# Patient Record
Sex: MALE | NOT HISPANIC OR LATINO | URBAN - METROPOLITAN AREA
[De-identification: names, ages, dates, MRNs, and addresses within clinical notes are randomized per-mention and may not be internally consistent; named-entity substitution may affect disease eponyms.]

---

## 2017-10-26 ENCOUNTER — INPATIENT (INPATIENT)
Facility: HOSPITAL | Age: 82
LOS: 9 days | Discharge: HOME | DRG: 329 | End: 2017-11-05
Attending: SURGERY | Admitting: SURGERY
Payer: COMMERCIAL

## 2017-10-26 VITALS
OXYGEN SATURATION: 99 % | RESPIRATION RATE: 18 BRPM | HEART RATE: 99 BPM | DIASTOLIC BLOOD PRESSURE: 86 MMHG | TEMPERATURE: 98 F | SYSTOLIC BLOOD PRESSURE: 126 MMHG

## 2017-10-26 DIAGNOSIS — K56.609 UNSPECIFIED INTESTINAL OBSTRUCTION, UNSPECIFIED AS TO PARTIAL VERSUS COMPLETE OBSTRUCTION: ICD-10-CM

## 2017-10-26 LAB
ALBUMIN SERPL ELPH-MCNC: 4.4 G/DL — SIGNIFICANT CHANGE UP (ref 3.3–5)
ALP SERPL-CCNC: 99 U/L — SIGNIFICANT CHANGE UP (ref 40–120)
ALT FLD-CCNC: 14 U/L RC — SIGNIFICANT CHANGE UP (ref 10–45)
ANION GAP SERPL CALC-SCNC: 11 MMOL/L — SIGNIFICANT CHANGE UP (ref 5–17)
APPEARANCE UR: CLEAR — SIGNIFICANT CHANGE UP
APTT BLD: 28.3 SEC — SIGNIFICANT CHANGE UP (ref 27.5–37.4)
AST SERPL-CCNC: 19 U/L — SIGNIFICANT CHANGE UP (ref 10–40)
BACTERIA # UR AUTO: ABNORMAL /HPF
BASOPHILS # BLD AUTO: 0 K/UL — SIGNIFICANT CHANGE UP (ref 0–0.2)
BASOPHILS NFR BLD AUTO: 0.3 % — SIGNIFICANT CHANGE UP (ref 0–2)
BILIRUB SERPL-MCNC: 0.4 MG/DL — SIGNIFICANT CHANGE UP (ref 0.2–1.2)
BILIRUB UR-MCNC: NEGATIVE — SIGNIFICANT CHANGE UP
BLD GP AB SCN SERPL QL: NEGATIVE — SIGNIFICANT CHANGE UP
BUN SERPL-MCNC: 17 MG/DL — SIGNIFICANT CHANGE UP (ref 7–23)
CALCIUM SERPL-MCNC: 9.4 MG/DL — SIGNIFICANT CHANGE UP (ref 8.4–10.5)
CHLORIDE SERPL-SCNC: 103 MMOL/L — SIGNIFICANT CHANGE UP (ref 96–108)
CO2 SERPL-SCNC: 26 MMOL/L — SIGNIFICANT CHANGE UP (ref 22–31)
COLOR SPEC: YELLOW — SIGNIFICANT CHANGE UP
CREAT SERPL-MCNC: 0.79 MG/DL — SIGNIFICANT CHANGE UP (ref 0.5–1.3)
DIFF PNL FLD: NEGATIVE — SIGNIFICANT CHANGE UP
EOSINOPHIL # BLD AUTO: 0.1 K/UL — SIGNIFICANT CHANGE UP (ref 0–0.5)
EOSINOPHIL NFR BLD AUTO: 1.1 % — SIGNIFICANT CHANGE UP (ref 0–6)
EPI CELLS # UR: SIGNIFICANT CHANGE UP /HPF
GAS PNL BLDV: SIGNIFICANT CHANGE UP
GLUCOSE SERPL-MCNC: 120 MG/DL — HIGH (ref 70–99)
GLUCOSE UR QL: NEGATIVE — SIGNIFICANT CHANGE UP
HCT VFR BLD CALC: 39.4 % — SIGNIFICANT CHANGE UP (ref 39–50)
HGB BLD-MCNC: 13.5 G/DL — SIGNIFICANT CHANGE UP (ref 13–17)
INR BLD: 1.04 RATIO — SIGNIFICANT CHANGE UP (ref 0.88–1.16)
KETONES UR-MCNC: NEGATIVE — SIGNIFICANT CHANGE UP
LEUKOCYTE ESTERASE UR-ACNC: NEGATIVE — SIGNIFICANT CHANGE UP
LIDOCAIN IGE QN: 17 U/L — SIGNIFICANT CHANGE UP (ref 7–60)
LYMPHOCYTES # BLD AUTO: 0.9 K/UL — LOW (ref 1–3.3)
LYMPHOCYTES # BLD AUTO: 10.7 % — LOW (ref 13–44)
MCHC RBC-ENTMCNC: 32 PG — SIGNIFICANT CHANGE UP (ref 27–34)
MCHC RBC-ENTMCNC: 34.2 GM/DL — SIGNIFICANT CHANGE UP (ref 32–36)
MCV RBC AUTO: 93.8 FL — SIGNIFICANT CHANGE UP (ref 80–100)
MONOCYTES # BLD AUTO: 0.5 K/UL — SIGNIFICANT CHANGE UP (ref 0–0.9)
MONOCYTES NFR BLD AUTO: 6 % — SIGNIFICANT CHANGE UP (ref 2–14)
NEUTROPHILS # BLD AUTO: 6.7 K/UL — SIGNIFICANT CHANGE UP (ref 1.8–7.4)
NEUTROPHILS NFR BLD AUTO: 81.9 % — HIGH (ref 43–77)
NITRITE UR-MCNC: NEGATIVE — SIGNIFICANT CHANGE UP
PH UR: 6 — SIGNIFICANT CHANGE UP (ref 5–8)
PLATELET # BLD AUTO: 186 K/UL — SIGNIFICANT CHANGE UP (ref 150–400)
POTASSIUM SERPL-MCNC: 3.7 MMOL/L — SIGNIFICANT CHANGE UP (ref 3.5–5.3)
POTASSIUM SERPL-SCNC: 3.7 MMOL/L — SIGNIFICANT CHANGE UP (ref 3.5–5.3)
PROT SERPL-MCNC: 6.9 G/DL — SIGNIFICANT CHANGE UP (ref 6–8.3)
PROT UR-MCNC: SIGNIFICANT CHANGE UP
PROTHROM AB SERPL-ACNC: 11.3 SEC — SIGNIFICANT CHANGE UP (ref 9.8–12.7)
RBC # BLD: 4.2 M/UL — SIGNIFICANT CHANGE UP (ref 4.2–5.8)
RBC # FLD: 12 % — SIGNIFICANT CHANGE UP (ref 10.3–14.5)
RBC CASTS # UR COMP ASSIST: SIGNIFICANT CHANGE UP /HPF (ref 0–2)
RH IG SCN BLD-IMP: POSITIVE — SIGNIFICANT CHANGE UP
SODIUM SERPL-SCNC: 140 MMOL/L — SIGNIFICANT CHANGE UP (ref 135–145)
SP GR SPEC: 1.03 — HIGH (ref 1.01–1.02)
UROBILINOGEN FLD QL: NEGATIVE — SIGNIFICANT CHANGE UP
WBC # BLD: 8.2 K/UL — SIGNIFICANT CHANGE UP (ref 3.8–10.5)
WBC # FLD AUTO: 8.2 K/UL — SIGNIFICANT CHANGE UP (ref 3.8–10.5)
WBC UR QL: SIGNIFICANT CHANGE UP /HPF (ref 0–5)

## 2017-10-26 PROCEDURE — 71010: CPT | Mod: 26

## 2017-10-26 PROCEDURE — 74177 CT ABD & PELVIS W/CONTRAST: CPT | Mod: 26

## 2017-10-26 PROCEDURE — 99285 EMERGENCY DEPT VISIT HI MDM: CPT | Mod: 25

## 2017-10-26 RX ORDER — LEVOTHYROXINE SODIUM 125 MCG
37.5 TABLET ORAL DAILY
Qty: 0 | Refills: 0 | Status: DISCONTINUED | OUTPATIENT
Start: 2017-10-27 | End: 2017-10-28

## 2017-10-26 RX ORDER — LEVOTHYROXINE SODIUM 125 MCG
0 TABLET ORAL
Qty: 0 | Refills: 0 | COMMUNITY

## 2017-10-26 RX ORDER — MORPHINE SULFATE 50 MG/1
4 CAPSULE, EXTENDED RELEASE ORAL ONCE
Qty: 0 | Refills: 0 | Status: DISCONTINUED | OUTPATIENT
Start: 2017-10-26 | End: 2017-10-26

## 2017-10-26 RX ORDER — ACETAMINOPHEN 500 MG
1000 TABLET ORAL ONCE
Qty: 0 | Refills: 0 | Status: COMPLETED | OUTPATIENT
Start: 2017-10-26 | End: 2017-10-26

## 2017-10-26 RX ORDER — SODIUM CHLORIDE 9 MG/ML
1000 INJECTION, SOLUTION INTRAVENOUS
Qty: 0 | Refills: 0 | Status: DISCONTINUED | OUTPATIENT
Start: 2017-10-26 | End: 2017-10-27

## 2017-10-26 RX ORDER — SIMVASTATIN 20 MG/1
0 TABLET, FILM COATED ORAL
Qty: 0 | Refills: 0 | COMMUNITY

## 2017-10-26 RX ORDER — MORPHINE SULFATE 50 MG/1
2 CAPSULE, EXTENDED RELEASE ORAL EVERY 4 HOURS
Qty: 0 | Refills: 0 | Status: DISCONTINUED | OUTPATIENT
Start: 2017-10-26 | End: 2017-10-27

## 2017-10-26 RX ORDER — SODIUM CHLORIDE 9 MG/ML
1000 INJECTION INTRAMUSCULAR; INTRAVENOUS; SUBCUTANEOUS ONCE
Qty: 0 | Refills: 0 | Status: COMPLETED | OUTPATIENT
Start: 2017-10-26 | End: 2017-10-26

## 2017-10-26 RX ORDER — ONDANSETRON 8 MG/1
4 TABLET, FILM COATED ORAL ONCE
Qty: 0 | Refills: 0 | Status: COMPLETED | OUTPATIENT
Start: 2017-10-26 | End: 2017-10-26

## 2017-10-26 RX ORDER — ENOXAPARIN SODIUM 100 MG/ML
40 INJECTION SUBCUTANEOUS DAILY
Qty: 0 | Refills: 0 | Status: DISCONTINUED | OUTPATIENT
Start: 2017-10-26 | End: 2017-11-05

## 2017-10-26 RX ORDER — SIMVASTATIN 20 MG/1
40 TABLET, FILM COATED ORAL AT BEDTIME
Qty: 0 | Refills: 0 | Status: DISCONTINUED | OUTPATIENT
Start: 2017-10-26 | End: 2017-10-29

## 2017-10-26 RX ORDER — MORPHINE SULFATE 50 MG/1
4 CAPSULE, EXTENDED RELEASE ORAL EVERY 4 HOURS
Qty: 0 | Refills: 0 | Status: DISCONTINUED | OUTPATIENT
Start: 2017-10-26 | End: 2017-10-27

## 2017-10-26 RX ORDER — MORPHINE SULFATE 50 MG/1
2 CAPSULE, EXTENDED RELEASE ORAL ONCE
Qty: 0 | Refills: 0 | Status: DISCONTINUED | OUTPATIENT
Start: 2017-10-26 | End: 2017-10-26

## 2017-10-26 RX ADMIN — MORPHINE SULFATE 4 MILLIGRAM(S): 50 CAPSULE, EXTENDED RELEASE ORAL at 11:30

## 2017-10-26 RX ADMIN — Medication 1000 MILLIGRAM(S): at 05:30

## 2017-10-26 RX ADMIN — MORPHINE SULFATE 4 MILLIGRAM(S): 50 CAPSULE, EXTENDED RELEASE ORAL at 10:52

## 2017-10-26 RX ADMIN — ENOXAPARIN SODIUM 40 MILLIGRAM(S): 100 INJECTION SUBCUTANEOUS at 17:58

## 2017-10-26 RX ADMIN — SODIUM CHLORIDE 100 MILLILITER(S): 9 INJECTION, SOLUTION INTRAVENOUS at 17:58

## 2017-10-26 RX ADMIN — ONDANSETRON 4 MILLIGRAM(S): 8 TABLET, FILM COATED ORAL at 05:05

## 2017-10-26 RX ADMIN — SIMVASTATIN 40 MILLIGRAM(S): 20 TABLET, FILM COATED ORAL at 21:23

## 2017-10-26 RX ADMIN — MORPHINE SULFATE 2 MILLIGRAM(S): 50 CAPSULE, EXTENDED RELEASE ORAL at 17:23

## 2017-10-26 RX ADMIN — Medication 400 MILLIGRAM(S): at 05:04

## 2017-10-26 RX ADMIN — MORPHINE SULFATE 2 MILLIGRAM(S): 50 CAPSULE, EXTENDED RELEASE ORAL at 17:53

## 2017-10-26 RX ADMIN — SODIUM CHLORIDE 2000 MILLILITER(S): 9 INJECTION INTRAMUSCULAR; INTRAVENOUS; SUBCUTANEOUS at 05:04

## 2017-10-26 NOTE — ED PROVIDER NOTE - OBJECTIVE STATEMENT
81 y/o male with HLD and hypothyroid p/w abdominal pain. Per patient, pain started after dinner. Pain is suprapubic and sharp and relieved with burping. Never had pain like this in the past. Denies any fevers, chills, CP, SOB, N/V/D, blood in stool, hematuria, dysuria. States has not had a large bowel movement in 1 week. Patient reports passing minimal amount of gas. Denies any hx of constipation. Has not taken anything for constipation.

## 2017-10-26 NOTE — H&P ADULT - ASSESSMENT
82M PHx HLD & hypothyroidism w/ abdominal pain x1 day, no BM x1week, found to have LBO at splenic flexure w/ evidence of pancreatic mass, peritoneal & lung nodules,. Plan for endoscopy from below by GI to attempt decompression & further evaluate possible mass at splenic flexure.     -Admit to Surgical Oncology, Attending Dr. Lamont Elizondo   -GI consult pending for likely flexible sigmoidoscopy    -NPO  -IVF  -DVTppx  -con't home statin  -obtain recent colonoscopy report  -verify home Rx dosing    Discussed w/ Attending    Questions/concerns: contact oncgaro Ruiz team PA/resident [phone]

## 2017-10-26 NOTE — ED PROVIDER NOTE - MEDICAL DECISION MAKING DETAILS
82M hx hld hypothyroid presents to the ED with abdominal pain. pain diffuse constant non-radiating. pain became significant tonight. has only passed small amount of stool over the past week. Has had gas from below. No f/c/cp/sob/ha/dizziness/n/v/dysuria. on exam pt with soft abd but mild ttp. Will obtain labs ct abd ua symptom control and reassess. Gadiel Arredondo M.D., Attending Physician

## 2017-10-26 NOTE — ED ADULT NURSE NOTE - ED STAT RN HANDOFF DETAILS 2
hand off given to oncoming RN Shaista Valdez. Awaiting surg bed. NPO. IV intact without sx of infilt. minimal abd pain at present. voiding well

## 2017-10-26 NOTE — H&P ADULT - NSHPOUTPATIENTPROVIDERS_GEN_ALL_CORE
GI: [facility] [phone], in [place])  PMD: Dr. Josh Lovett [phone]

## 2017-10-26 NOTE — CONSULT NOTE ADULT - ASSESSMENT
81 y/o male with HLD and hypothyroid p/w abdominal pain x 1 day and constipation x 1 week.    1) LBO  Patient presenting with constipation and LBO at splenic flexure and diverticulosis seen on CT scan. Transverse colon was dilated on imaging--largest area of dilation ~5cm    DDx: colon cancer vs. volvulus vs. diverticulitis vs. constipation    -plan for flex sig  -keep NPO for now  -pain management as per primary team 81 y/o male with HLD and hypothyroid p/w abdominal pain x 1 day and constipation x 1 week.    1) LBO  Patient presenting with constipation and LBO at splenic flexure and diverticulosis seen on CT scan. Transverse colon was dilated on imaging--largest area of dilation ~5cm    DDx: colon cancer vs. volvulus vs. diverticulitis vs. constipation    -plan for flex sig tomorrow if no plan for surgery as per surgical team  -keep NPO for now  - please give 3 tap water enemas q1h starting 3 hours before the procedure  -pain management as per primary team

## 2017-10-26 NOTE — ED PROVIDER NOTE - ATTENDING CONTRIBUTION TO CARE
82M hx hld hypothyroid presents to the ED with abdominal pain. pain diffuse constant non-radiating. pain became significant tonight. has only passed small amount of stool over the past week. Has had gas from below. No f/c/cp/sob/ha/dizziness/n/v/dysuria. on exam pt with soft abd but mild ttp. Will obtain labs ct abd ua symptom control and reassess.     Constitutional: No fever or chills  Eyes: No visual changes  HEENT: No throat pain  CV: No chest pain  Resp: No SOB no cough  GI: + abd pain, decreased bm  : No dysuria  MSK: No musculoskeletal pain  Skin: No rash  Neuro: No headache     Constitutional: mild distress AAOx3  Eyes: PERRLA EOMI  Head: Normocephalic atraumatic  Mouth: MMM  Cardiac: regular rate   Resp: Lungs CTAB  GI: Abd soft mild lower abd ttp no rebound or guarding  Neuro: CN2-12 intact  Skin: No rashes   Gadiel Arredondo M.D., Attending Physician

## 2017-10-26 NOTE — ED ADULT NURSE REASSESSMENT NOTE - NS ED NURSE REASSESS COMMENT FT1
0704m Report received from night nurse. Pt awaiting CT results. A&Ox3. Wife at [facility]. IVL intact RACF without sx of infilt. No c/o abd pain with rest. 9/10 when belching. States frequent belching.  Color pink. Skin W&D. Lungs clear. lower abd TTP, firm

## 2017-10-26 NOTE — H&P ADULT - NSHPLABSRESULTS_GEN_ALL_CORE
13.5   8.2   )-----------( 186      ( 26 Oct 2017 05:03 )             39.4       10    140  |  103  |  17  ----------------------------<  120<H>  3.7   |  26  |  0.79    Ca    9.4      26 Oct 2017 05:03    TPro  6.9  /  Alb  4.4  /  TBili  0.4  /  DBili  x   /  AST  19  /  ALT  14  /  AlkPhos  99  10-26    Urinalysis Basic - ( 26 Oct 2017 11:25 )    Color: Yellow / Appearance: Clear / S.026 / pH: x  Gluc: x / Ketone: Negative  / Bili: Negative / Urobili: Negative   Blood: x / Protein: Trace / Nitrite: Negative   Leuk Esterase: Negative / RBC: 0-2 /HPF / WBC 0-2 /HPF   Sq Epi: x / Non Sq Epi: OCC /HPF / Bacteria: Few /HPF      CT abd/pelvis    IMPRESSION:   1.  Large bowel obstruction with transition point at the splenic flexure   where there is mural thickening and extracolonic soft tissue nodularity,   suspicious for neoplasm. Correlation with colonoscopy is recommended.   Possible additional areas of narrowing along the sigmoid colon.  2.  Asymmetric masslike enlargement ofthe pancreatic tail, possibly   neoplasm.  3.  Probable peritoneal carcinomatosis and small volume abdominal ascites.  4.  Colonic diverticulosis with mild inflammatory changes in the sigmoid   colon, probably related to adjacent neoplasm.  5.  Mild fullness of the left renal collecting system, possibly due to   distended urinary bladder.  6.  Enhancing nodule along the left lateral aspect of the urinary   bladder, measuring 0.9 cm, possibly inflammatory or neoplasm.  7.  Pulmonary nodules along the right major and minor fissure, possibly   intrapulmonary lymph nodes or metastases. Consider further evaluation   with dedicated CT chest.

## 2017-10-26 NOTE — ED PROVIDER NOTE - GASTROINTESTINAL, MLM
Abdomen soft, mildly distended. Minimal tenderness in LQ bilaterally. Can feel gas shifting in bowels with deep palpation.

## 2017-10-26 NOTE — ED ADULT NURSE NOTE - OBJECTIVE STATEMENT
82y male with hx of high cholesterol and hypothyroidism presents to the ER with abdominal pain. pt is alert and oriented x 4 and speaking coherently. Pt states he has had "a lot of gas" after dinner for the past week. Pt states he has sharp lower bilateral abdominal pain after eating. PT states the pain is decreased with belching. Pt in NAD- respirations equal and regular. abdomen soft and non distended. Pt appears comfortable at the bedside and denies pain. PT wife at the bedside. VSS, safety maintained. will reassess.

## 2017-10-26 NOTE — H&P ADULT - NSHPPHYSICALEXAM_GEN_ALL_CORE
General: well developed, well nourished, NAD  Neuro: alert and oriented, no focal deficits, moves all extremities spontaneously  HEENT: NCAT, anicteric, mucosa moist, no sublingual jaundice; no cervical or supraclavicular lymphadenopathy  Respiratory: CTA b/l, no increased work of breathing   CVS: regular no extra heart sounds  Abdomen: soft, nontender; fullness of LUQ  Extremities: no edema, sensation and movement grossly intact  Skin: warm, dry, appropriate color, no jaundice

## 2017-10-26 NOTE — H&P ADULT - HISTORY OF PRESENT ILLNESS
82M PHx HLD & hypothyroidism w/out PSHx presented to [facility] ED the morning of 10/26 w/ abdominal pain since the previous evening. Describes the pain as short, in his lower abdomen, relieved w/ belching. Reports pain started after dinner, and that he has not had a normal/large BM in >1wk. Some small pellets of stool this am, but no flatus since day prior to admission. Denies N/V, CP, SOB, blood in stool or urine. Denies h/o constipation, and he has not taken anything for constipation. Of note he reports recent colonoscopy (~3mo ago w/ [facility] [phone], in [place]) that was notable for 2 polyps that were removed and came back benign.     In ED VS: T36.4, P86, /97, RR18, SpO2 100 on RA. Labs notable for WBC 8.2, Hct 39.4, Venous lactate 1.2, lipase 17. 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 Surgery was consulted for evaluation of LBO & concern for metastatic disease.

## 2017-10-26 NOTE — CONSULT NOTE ADULT - SUBJECTIVE AND OBJECTIVE BOX
Chief Complaint:  Patient is a 82y old  Male who presents with a chief complaint of     HPI:  81 y/o male with HLD and hypothyroid p/w abdominal pain.     Per patient, pain started after dinner.      Denies any fevers, chills, CP, SOB, N/V/D, blood in stool, hematuria, dysuria. States has not had a large bowel movement in 1 week. Patient reports passing minimal amount of gas. Denies any hx of constipation. Has not taken anything for consiptation.      Allergies:  No Known Allergies      Home Medications:    Hospital Medications:      PMHX/PSHX:  Hypothyroid  Hyperlipemia  No significant past surgical history      Family history:      Social History:     ROS:     General:  No wt loss, fevers, chills, night sweats, fatigue,   Eyes:  Good vision, no reported pain  ENT:  No sore throat, pain, runny nose, dysphagia  CV:  No pain, palpitations, hypo/hypertension  Resp:  No dyspnea, cough, tachypnea, wheezing  GI:  See HPI  :  No pain, bleeding, incontinence, nocturia  Muscle:  No pain, weakness  Neuro:  No weakness, tingling, memory problems  Psych:  No fatigue, insomnia, mood problems, depression  Endocrine:  No polyuria, polydipsia, cold/heat intolerance  Heme:  No petechiae, ecchymosis, easy bruisability  Skin:  No rash, edema      PHYSICAL EXAM:     GENERAL:  Appears stated age, well-groomed, well-nourished, no distress  HEENT:  NC/AT,  conjunctivae clear and pink,  no JVD  CHEST:  Full & symmetric excursion, no increased effort, breath sounds clear  HEART:  Regular rhythm, S1, S2, no murmur/rub/S3/S4, no abdominal bruit, no edema  ABDOMEN:  Soft, non-tender, non-distended, normoactive bowel sounds,  no masses ,  EXTREMITIES:  no cyanosis,clubbing or edema  SKIN:  No rash/erythema/ecchymoses/petechiae/wounds/abscess/warm/dry  NEURO:  Alert, oriented    Vital Signs:  Vital Signs Last 24 Hrs  T(C): 36.4 (26 Oct 2017 07:25), Max: 36.4 (26 Oct 2017 03:45)  T(F): 97.5 (26 Oct 2017 07:25), Max: 97.6 (26 Oct 2017 03:45)  HR: 85 (26 Oct 2017 10:55) (85 - 103)  BP: 151/92 (26 Oct 2017 10:55) (121/90 - 151/92)  BP(mean): --  RR: 18 (26 Oct 2017 10:55) (18 - 18)  SpO2: 100% (26 Oct 2017 10:55) (99% - 100%)  Daily Height in cm: 160.02 (26 Oct 2017 07:25)    Daily     LABS:                        13.5   8.2   )-----------( 186      ( 26 Oct 2017 05:03 )             39.4     10-    140  |  103  |  17  ----------------------------<  120<H>  3.7   |  26  |  0.79    Ca    9.4      26 Oct 2017 05:03    TPro  6.9  /  Alb  4.4  /  TBili  0.4  /  DBili  x   /  AST  19  /  ALT  14  /  AlkPhos  99  10-    LIVER FUNCTIONS - ( 26 Oct 2017 05:03 )  Alb: 4.4 g/dL / Pro: 6.9 g/dL / ALK PHOS: 99 U/L / ALT: 14 U/L RC / AST: 19 U/L / GGT: x             Urinalysis Basic - ( 26 Oct 2017 11:25 )    Color: Yellow / Appearance: Clear / S.026 / pH: x  Gluc: x / Ketone: Negative  / Bili: Negative / Urobili: Negative   Blood: x / Protein: Trace / Nitrite: Negative   Leuk Esterase: Negative / RBC: 0-2 /HPF / WBC 0-2 /HPF   Sq Epi: x / Non Sq Epi: OCC /HPF / Bacteria: Few /HPF      Amylase Serum--      Lipase serum17       Ammonia--      Imaging: Chief Complaint:  Patient is a 82y old  Male who presents with a chief complaint of     HPI:  83 y/o male with HLD and hypothyroid p/w abdominal pain x 1 day and constipation x 1 week.    As per patient, constipation started about 1 week ago. He noticed decreased stool and increased flatulence. Says he was able to move his bowels but very small amount. Denies any melena, or BRBpR, No N/V fevers or chills. He says he normally moves his bowels about 1 x per day. No abnormal weight loss or problems with constipation in the past. No abdominal distension but did note decreased appetite and feeling of abdominal bloating. His last colonoscopy was last year for screening at [facility], two polyps found and removed.        Allergies:  No Known Allergies      Home Medications:    Hospital Medications:      PMHX/PSHX:  Hypothyroid  Hyperlipemia  No significant past surgical history      Family history:      Social History:     ROS:     General:  No wt loss, fevers, chills, night sweats, fatigue,   Eyes:  Good vision, no reported pain  ENT:  No sore throat, pain, runny nose, dysphagia  CV:  No pain, palpitations, hypo/hypertension  Resp:  No dyspnea, cough, tachypnea, wheezing  GI:  See HPI  :  No pain, bleeding, incontinence, nocturia  Muscle:  No pain, weakness  Neuro:  No weakness, tingling, memory problems  Psych:  No fatigue, insomnia, mood problems, depression  Endocrine:  No polyuria, polydipsia, cold/heat intolerance  Heme:  No petechiae, ecchymosis, easy bruisability  Skin:  No rash, edema      PHYSICAL EXAM:     GENERAL:  Appears stated age, well-groomed, well-nourished, no distress  HEENT:  NC/AT,  conjunctivae clear and pink,  no JVD  CHEST:  Full & symmetric excursion, no increased effort, breath sounds clear  HEART:  Regular rhythm, S1, S2, no murmur/rub/S3/S4, no abdominal bruit, no edema  ABDOMEN:  soft nondistended non tender on palpation, no masses appreciated    Vital Signs:  Vital Signs Last 24 Hrs  T(C): 36.4 (26 Oct 2017 07:25), Max: 36.4 (26 Oct 2017 03:45)  T(F): 97.5 (26 Oct 2017 07:25), Max: 97.6 (26 Oct 2017 03:45)  HR: 85 (26 Oct 2017 10:55) (85 - 103)  BP: 151/92 (26 Oct 2017 10:55) (121/90 - 151/92)  BP(mean): --  RR: 18 (26 Oct 2017 10:55) (18 - 18)  SpO2: 100% (26 Oct 2017 10:55) (99% - 100%)  Daily Height in cm: 160.02 (26 Oct 2017 07:25)    Daily     LABS:                        13.5   8.2   )-----------( 186      ( 26 Oct 2017 05:03 )             39.4     10-26    140  |  103  |  17  ----------------------------<  120<H>  3.7   |  26  |  0.79    Ca    9.4      26 Oct 2017 05:03    TPro  6.9  /  Alb  4.4  /  TBili  0.4  /  DBili  x   /  AST  19  /  ALT  14  /  AlkPhos  99  10-26    LIVER FUNCTIONS - ( 26 Oct 2017 05:03 )  Alb: 4.4 g/dL / Pro: 6.9 g/dL / ALK PHOS: 99 U/L / ALT: 14 U/L RC / AST: 19 U/L / GGT: x             Urinalysis Basic - ( 26 Oct 2017 11:25 )    Color: Yellow / Appearance: Clear / S.026 / pH: x  Gluc: x / Ketone: Negative  / Bili: Negative / Urobili: Negative   Blood: x / Protein: Trace / Nitrite: Negative   Leuk Esterase: Negative / RBC: 0-2 /HPF / WBC 0-2 /HPF   Sq Epi: x / Non Sq Epi: OCC /HPF / Bacteria: Few /HPF      Amylase Serum--      Lipase serum17       Ammonia--      Imaging:      < from: CT Abdomen and Pelvis w/ Oral Cont and w/ IV Cont (10.26.17 @ 07:33) >  IMPRESSION:   1.  Large bowel obstruction with transition point at the splenic flexure   where there is mural thickening and extracolonic soft tissue nodularity,   suspicious for neoplasm. Correlation with colonoscopy is recommended.   Possible additional areas of narrowing along the sigmoid colon.  2.  Asymmetric masslike enlargement ofthe pancreatic tail, possibly   neoplasm.  3.  Probable peritoneal carcinomatosis and small volume abdominal ascites.  4.  Colonic diverticulosis with mild inflammatory changes in the sigmoid   colon, probably related to adjacent neoplasm.  5.  Mild fullness of the left renal collecting system, possibly due to   distended urinary bladder.  6.  Enhancing nodule along the left lateral aspect of the urinary   bladder, measuring 0.9 cm, possibly inflammatory or neoplasm.  7.  Pulmonary nodules along the right major and minor fissure, possibly   intrapulmonary lymph nodes or metastases. Consider further evaluation   with dedicated CT chest.     < end of copied text > Chief Complaint:  Patient is a 82y old  Male who presents with a chief complaint of     HPI:  81 y/o male with HLD and hypothyroid p/w abdominal pain x 1 day and constipation x 1 week.    As per patient, constipation started about 1 week ago. He noticed decreased stool and increased gas and burping. Says he was able to move his bowels but very small amount. Denies any melena, or BRBpR, No N/V fevers or chills. He says he normally moves his bowels about 1 x per day. No abnormal weight loss or problems with constipation in the past. No abdominal distension but did note decreased appetite and feeling of abdominal bloating. His last colonoscopy was last year for screening at [facility], two polyps found and removed.        Allergies:  No Known Allergies      Home Medications:    Hospital Medications:      PMHX/PSHX:  Hypothyroid  Hyperlipemia  No significant past surgical history      Family history:      Social History:     ROS:     General:  No wt loss, fevers, chills, night sweats, fatigue,   Eyes:  Good vision, no reported pain  ENT:  No sore throat, pain, runny nose, dysphagia  CV:  No pain, palpitations, hypo/hypertension  Resp:  No dyspnea, cough, tachypnea, wheezing  GI:  See HPI  :  No pain, bleeding, incontinence, nocturia  Muscle:  No pain, weakness  Neuro:  No weakness, tingling, memory problems  Psych:  No fatigue, insomnia, mood problems, depression  Endocrine:  No polyuria, polydipsia, cold/heat intolerance  Heme:  No petechiae, ecchymosis, easy bruisability  Skin:  No rash, edema      PHYSICAL EXAM:     GENERAL:  Appears stated age, well-groomed, well-nourished, no distress  HEENT:  NC/AT,  conjunctivae clear and pink,  no JVD  CHEST:  Full & symmetric excursion, no increased effort, breath sounds clear  HEART:  Regular rhythm, S1, S2, no murmur/rub/S3/S4, no abdominal bruit, no edema  ABDOMEN:  soft nondistended non tender on palpation, no masses appreciated    Vital Signs:  Vital Signs Last 24 Hrs  T(C): 36.4 (26 Oct 2017 07:25), Max: 36.4 (26 Oct 2017 03:45)  T(F): 97.5 (26 Oct 2017 07:25), Max: 97.6 (26 Oct 2017 03:45)  HR: 85 (26 Oct 2017 10:55) (85 - 103)  BP: 151/92 (26 Oct 2017 10:55) (121/90 - 151/92)  BP(mean): --  RR: 18 (26 Oct 2017 10:55) (18 - 18)  SpO2: 100% (26 Oct 2017 10:55) (99% - 100%)  Daily Height in cm: 160.02 (26 Oct 2017 07:25)    Daily     LABS:                        13.5   8.2   )-----------( 186      ( 26 Oct 2017 05:03 )             39.4     10    140  |  103  |  17  ----------------------------<  120<H>  3.7   |  26  |  0.79    Ca    9.4      26 Oct 2017 05:03    TPro  6.9  /  Alb  4.4  /  TBili  0.4  /  DBili  x   /  AST  19  /  ALT  14  /  AlkPhos  99  10-26    LIVER FUNCTIONS - ( 26 Oct 2017 05:03 )  Alb: 4.4 g/dL / Pro: 6.9 g/dL / ALK PHOS: 99 U/L / ALT: 14 U/L RC / AST: 19 U/L / GGT: x             Urinalysis Basic - ( 26 Oct 2017 11:25 )    Color: Yellow / Appearance: Clear / S.026 / pH: x  Gluc: x / Ketone: Negative  / Bili: Negative / Urobili: Negative   Blood: x / Protein: Trace / Nitrite: Negative   Leuk Esterase: Negative / RBC: 0-2 /HPF / WBC 0-2 /HPF   Sq Epi: x / Non Sq Epi: OCC /HPF / Bacteria: Few /HPF      Amylase Serum--      Lipase serum17       Ammonia--      Imaging:      < from: CT Abdomen and Pelvis w/ Oral Cont and w/ IV Cont (10.26.17 @ 07:33) >  IMPRESSION:   1.  Large bowel obstruction with transition point at the splenic flexure   where there is mural thickening and extracolonic soft tissue nodularity,   suspicious for neoplasm. Correlation with colonoscopy is recommended.   Possible additional areas of narrowing along the sigmoid colon.  2.  Asymmetric masslike enlargement ofthe pancreatic tail, possibly   neoplasm.  3.  Probable peritoneal carcinomatosis and small volume abdominal ascites.  4.  Colonic diverticulosis with mild inflammatory changes in the sigmoid   colon, probably related to adjacent neoplasm.  5.  Mild fullness of the left renal collecting system, possibly due to   distended urinary bladder.  6.  Enhancing nodule along the left lateral aspect of the urinary   bladder, measuring 0.9 cm, possibly inflammatory or neoplasm.  7.  Pulmonary nodules along the right major and minor fissure, possibly   intrapulmonary lymph nodes or metastases. Consider further evaluation   with dedicated CT chest.     < end of copied text >

## 2017-10-26 NOTE — ED PROVIDER NOTE - CARDIAC, MLM
PROBLEM/ASSESSMENT/PLAN  PROBLEM: ACUTE HYPOXIC RESP FAILURE   4/28 11a /25/55 4/28 3l 92%   ASSESSMENT/RECOMMENDATION/PLAN  Keep HOB elevated 30  Pulse oximetry monitoring O2 supplementation Target to keep pulse ox in 90-95% range   PROBLEM:  RULE OUT VENOUS THROMBOEMBOLISM  4/28 Combative pt Only rle could be done No DVT Rle   ASSESSMENT/RECOMMENDATION/PLAN Has HO L leg DVT DVT P if no surgery  PROBLEM:  UTI  4/28 W 5.4   4/27 UA Over 50 W [place] bld Many bact   ASSESSMENT/RECOMMENDATION/PLAN On zosyn (4/27) lactobacillus (4/27)   PROBLEM:  RO PNEUMONIA  4/27 CXR IS changes lung l  ASSESSMENT/RECOMMENDATION/PLAN See UTI   PROBLEM:  LACTICEMIA /27-4/28 LA 2.4-1.8-.9   ASSESSMENT/RECOMMENDATION/PLAN Improved  PROBLEM:  HYPERTENSION  ASSESSMENT/RECOMMENDATION/PLAN On labetalol 100.2 (4/27)   PROBLEM:  L HYDRONEPHROSIS   4/27 CTAP Marked L obstr hydro sec multiple l renal pelvic calculi   ASSESSMENT/RECOMMENDATION/PLAN 4/28 Dr JIMENEZ discussed patient's condition in detail with niece who is health care proxy (Anahi Barriosnathalie) who did not want any intervention with cysto/stent or percutaneous nephrostomy, rather she requested medical management at this time,  PROBLEM: ROSEMARIE    2/24-4/27-4/28 Cr .95-1.9-1.6   ASSESSMENT/RECOMMENDATION/PLAN On NS 75 (4/27)   PROBLEM:  BPH   ASSESSMENT/RECOMMENDATION/PLAN On Tamsulosin .4 (4/27)   PROBLEM:  ANEMIA Hb drop   2/24-4/27-4/28 Hb 11.8-11.2-9.8   ASSESSMENT/RECOMMENDATION/PLAN Drop may in part be sec to ivf  PROBLEM:   THROMBOCYTOPENIA   2/24-4/27-4/28  Plt 108-114-81  ASSESSMENT/RECOMMENDATION/PLAN Likely reduced sec sepsis   PROBLEM:  AMS  4/27 CT H n   ASSESSMENT/RECOMMENDATION/PLAN   PROBLEM:  HYPOTHYRODIISM   ASSESSMENT/RECOMMENDATION/PLAN On levoxyl 100 (4/27)    GLOBAL ISSUE/BEST PRACTICE:        PROBLEM:      Analgesia:         na       PROBLEM: Sedation:       na  PROBLEM: HOB elevation:      Yes  PROBLEM: Stress ulcer prophylaxis:        pepcid 20 (4/27)           PROBLEM: VTE prophylaxis:            hsc (4/27)               PROBLEM: Glycemic control:       na  PROBLEM: Nutrition:         ROQUE (4/28)   PROBLEM: Advanced directive if any:                 dnr (4/28)   TIME SPENT Over 55 minutes spent on total encounter; activities included  rendering direct patient care, counseling and/or coordinating care by the attending physician  reviewing notes, labs data/ imaging , discussion with multidisciplinary team. Normal rate, regular rhythm.  Heart sounds S1, S2.  No murmurs, rubs or gallops.

## 2017-10-26 NOTE — CONSULT NOTE ADULT - SUBJECTIVE AND OBJECTIVE BOX
82M PHx HLD & hypothyroidism w/out PSHx presented to [facility] ED the morning of 10/26 w/ abdominal pain since the previous evening. Describes the pain as short, in his lower abdomen, relieved w/ belching. Reports pain started after dinner, and that he has not had a normal/large BM in >1wk. Some small pellets of stool this am, but no flatus since day prior to admission. Denies N/V, CP, SOB, blood in stool or urine. Denies h/o constipation, and he has not taken anything for constipation. Of note he reports recent colonoscopy (~3mo ago w/ [facility] [phone], in [place]) that was notable for 2 polyps that were removed and came back benign.     In ED VS: T36.4, P86, /97, RR18, SpO2 100 on RA. Labs notable for WBC 8.2, Hct 39.4, Venous lactate 1.2, lipase 17. 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       Large bowel obstruction with transition point at the splenic flexure   where there is mural thickening and extracolonic soft tissue nodularity,   suspicious for neoplasm. Correlation with colonoscopy is recommended.   Possible additional areas of narrowing along the sigmoid colon.  2.  Asymmetric masslike enlargement of the pancreatic tail, possibly   neoplasm.  3.  Probable peritoneal carcinomatosis and small volume abdominal ascites.  4.  Colonic diverticulosis with mild inflammatory changes in the sigmoid   colon, probably related to adjacent neoplasm.  5.  Mild fullness of the left renal collecting system, possibly due to   distended urinary bladder.  6.  Enhancing nodule along the left lateral aspect of the urinary   bladder, measuring 0.9 cm, possibly inflammatory or neoplasm.  7.  Pulmonary nodules along the right major and minor fissure, possibly   intrapulmonary lymph nodes or metastases. Consider further evaluation   with dedicated CT chest.       Outpatient MD's  GI: [facility] [phone], in [place])  PMD: Dr. Josh Lovett [phone] Surgical Oncology Consult    CC: abdominal pain, no BM x1wk    HPI:  82M PHx HLD & hypothyroidism w/out PSHx presented to [facility] ED the morning of 10/26 w/ abdominal pain since the previous evening. Describes the pain as short, in his lower abdomen, relieved w/ belching. Reports pain started after dinner, and that he has not had a normal/large BM in >1wk. Some small pellets of stool this am, but no flatus since day prior to admission. Denies N/V, CP, SOB, blood in stool or urine. Denies h/o constipation, and he has not taken anything for constipation. Of note he reports recent colonoscopy (~3mo ago w/ [facility] [phone], in [place]) that was notable for 2 polyps that were removed and came back benign.     In ED VS: T36.4, P86, /97, RR18, SpO2 100 on RA. Labs notable for WBC 8.2, Hct 39.4, Venous lactate 1.2, lipase 17. 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 Surgery was consulted for evaluation of LBO & concern for metastatic disease.       Outpatient MD's  GI: [facility] [phone], in [place])  PMD: Dr. Josh Lovett [phone]      PMHx: Hypothyroid  Hyperlipemia    PSHx: No significant past surgical history    Medications (inpatient): enoxaparin Injectable 40 milliGRAM(s) SubCutaneous daily  lactated ringers. 1000 milliLiter(s) IV Continuous <Continuous>  simvastatin 40 milliGRAM(s) Oral at bedtime    Home Rx:  synthroid (unknown dose)  simvastatin (unknown dose)    Allergies: No Known Allergies    Social Hx: denies EtOH or tobacco use. .    Physical Exam  T(C): 36.4 (10-26-17 @ 07:25)  HR: 85 (10-26-17 @ 10:55) (85 - 103)  BP: 151/92 (10-26-17 @ 10:55) (121/90 - 151/92)  RR: 18 (10-26-17 @ 10:55) (18 - 18)  SpO2: 100% (10-26-17 @ 10:55) (99% - 100%)  Wt(kg): --  Tmax: T(C): , Max: 36.4 (10-26-17 @ 03:45)  Wt(kg): --      General: well developed, well nourished, NAD  Neuro: alert and oriented, no focal deficits, moves all extremities spontaneously  HEENT: NCAT, anicteric, mucosa moist, no sublingual jaundice; no cervical or supraclavicular lymphadenopathy  Respiratory: CTA b/l, no increased work of breathing   CVS: regular no extra heart sounds  Abdomen: soft, nontender; fullness of LUQ  Extremities: no edema, sensation and movement grossly intact  Skin: warm, dry, appropriate color, no jaundice      Labs:                        13.5   8.2   )-----------( 186      ( 26 Oct 2017 05:03 )             39.4       10-26    140  |  103  |  17  ----------------------------<  120<H>  3.7   |  26  |  0.79    Ca    9.4      26 Oct 2017 05:03    TPro  6.9  /  Alb  4.4  /  TBili  0.4  /  DBili  x   /  AST  19  /  ALT  14  /  AlkPhos  99  10-26    Urinalysis Basic - ( 26 Oct 2017 11:25 )    Color: Yellow / Appearance: Clear / S.026 / pH: x  Gluc: x / Ketone: Negative  / Bili: Negative / Urobili: Negative   Blood: x / Protein: Trace / Nitrite: Negative   Leuk Esterase: Negative / RBC: 0-2 /HPF / WBC 0-2 /HPF   Sq Epi: x / Non Sq Epi: OCC /HPF / Bacteria: Few /HPF        Imaging and other studies: Surgical Oncology Consult    CC: abdominal pain, no BM x1wk    HPI:  82M PHx HLD & hypothyroidism w/out PSHx presented to [facility] ED the morning of 10/26 w/ abdominal pain since the previous evening. Describes the pain as short, in his lower abdomen, relieved w/ belching. Reports pain started after dinner, and that he has not had a normal/large BM in >1wk. Some small pellets of stool this am, but no flatus since day prior to admission. Denies N/V, CP, SOB, blood in stool or urine. Denies h/o constipation, and he has not taken anything for constipation. Of note he reports recent colonoscopy (~3mo ago w/ [facility] [phone], in [place]) that was notable for 2 polyps that were removed and came back benign.     In ED VS: T36.4, P86, /97, RR18, SpO2 100 on RA. Labs notable for WBC 8.2, Hct 39.4, Venous lactate 1.2, lipase 17. 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 Surgery was consulted for evaluation of LBO & concern for metastatic disease.       Outpatient MD's  GI: [facility] [phone], in [place])  PMD: Dr. Josh Lovett [phone]      PMHx: Hypothyroid  Hyperlipemia    PSHx: No significant past surgical history    Medications (inpatient): enoxaparin Injectable 40 milliGRAM(s) SubCutaneous daily  lactated ringers. 1000 milliLiter(s) IV Continuous <Continuous>  simvastatin 40 milliGRAM(s) Oral at bedtime    Home Rx:  synthroid (unknown dose)  simvastatin (unknown dose)    Allergies: No Known Allergies    Social Hx: denies EtOH or tobacco use. .    Physical Exam  T(C): 36.4 (10-26-17 @ 07:25)  HR: 85 (10-26-17 @ 10:55) (85 - 103)  BP: 151/92 (10-26-17 @ 10:55) (121/90 - 151/92)  RR: 18 (10-26-17 @ 10:55) (18 - 18)  SpO2: 100% (10-26-17 @ 10:55) (99% - 100%)  Wt(kg): --  Tmax: T(C): , Max: 36.4 (10-26-17 @ 03:45)  Wt(kg): --      General: well developed, well nourished, NAD  Neuro: alert and oriented, no focal deficits, moves all extremities spontaneously  HEENT: NCAT, anicteric, mucosa moist, no sublingual jaundice; no cervical or supraclavicular lymphadenopathy  Respiratory: CTA b/l, no increased work of breathing   CVS: regular no extra heart sounds  Abdomen: soft, nontender; fullness of LUQ  Extremities: no edema, sensation and movement grossly intact  Skin: warm, dry, appropriate color, no jaundice      Labs:                        13.5   8.2   )-----------( 186      ( 26 Oct 2017 05:03 )             39.4       10-26    140  |  103  |  17  ----------------------------<  120<H>  3.7   |  26  |  0.79    Ca    9.4      26 Oct 2017 05:03    TPro  6.9  /  Alb  4.4  /  TBili  0.4  /  DBili  x   /  AST  19  /  ALT  14  /  AlkPhos  99  10-26    Urinalysis Basic - ( 26 Oct 2017 11:25 )    Color: Yellow / Appearance: Clear / S.026 / pH: x  Gluc: x / Ketone: Negative  / Bili: Negative / Urobili: Negative   Blood: x / Protein: Trace / Nitrite: Negative   Leuk Esterase: Negative / RBC: 0-2 /HPF / WBC 0-2 /HPF   Sq Epi: x / Non Sq Epi: OCC /HPF / Bacteria: Few /HPF        Imaging and other studies:    < from: CT Abdomen and Pelvis w/ Oral Cont and w/ IV Cont (10.26.17 @ 07:33) >  CT ABDOMEN AND PELVIS OC IC                            PROCEDURE DATE:  10/26/2017            INTERPRETATION:  CLINICAL INFORMATION: Lower quadrant abdominal pain. No   bowel movement for one week.    COMPARISON: None.    PROCEDURE:   CT of the Abdomen and Pelvis was performed with intravenous contrast.   Intravenous contrast: 90 mL Omnipaque 350. 10 mL discarded.  Oral contrast: positive contrast was administered.  Sagittal and coronal reformats were performed.    FINDINGS:    LOWER CHEST: Pulmonary nodules along the right major and minor fissure.   The largest measures 0.3 cm.    LIVER: Within normal limits.  BILE DUCTS: Normal caliber.   GALLBLADDER: Within normal limits.  SPLEEN: Within normal limits.  PANCREAS: Asymmetric masslike enlargement of the pancreatic tail.  ADRENALS: Within normal limits.  KIDNEYS/URETERS: Mild fullness of the left renal collecting system.     BLADDER: Enhancing nodule along the left lateral wall of the urinary   bladder, measuring 0.9 x 0.9 cm. Distended urinary bladder.  REPRODUCTIVE ORGANS: The prostate is enlarged.    BOWEL: Large bowel obstruction with transition point at the splenic   flexure where there is mural thickening and extracolonic soft tissue   nodularity, possibly neoplasm. Additional area of luminal narrowing along   the junction of the descending and sigmoid colon. Colonic diverticulosis   with mild inflammatory changes in the sigmoid colon.  PERITONEUM: Peritoneal implant in the right lower quadrant (series 2,   image 84), measuring 1.7 x 1.0 cm, probably peritoneal carcinomatosis.   Small perisplenic ascites and a small amount of fluid along the right   paracolic gutter.  VESSELS:  Atherosclerotic change of the abdominal aorta and its branches.  RETROPERITONEUM: No lymphadenopathy.    ABDOMINAL WALL: Small left inguinal hernia  BONES: Multiple lucent lesions in the pelvis, nonspecific. Degenerative   changes of the spine.    IMPRESSION:   1.  Large bowel obstruction with transition point at the splenic flexure   where there is mural thickening and extracolonic soft tissue nodularity,   suspicious for neoplasm. Correlation with colonoscopy is recommended.   Possible additional areas of narrowing along the sigmoid colon.  2.  Asymmetric masslike enlargement ofthe pancreatic tail, possibly   neoplasm.  3.  Probable peritoneal carcinomatosis and small volume abdominal ascites.  4.  Colonic diverticulosis with mild inflammatory changes in the sigmoid   colon, probably related to adjacent neoplasm.  5.  Mild fullness of the left renal collecting system, possibly due to   distended urinary bladder.  6.  Enhancing nodule along the left lateral aspect of the urinary   bladder, measuring 0.9 cm, possibly inflammatory or neoplasm.  7.  Pulmonary nodules along the right major and minor fissure, possibly   intrapulmonary lymph nodes or metastases. Consider further evaluation   with dedicated CT chest.     < end of copied text >

## 2017-10-26 NOTE — ED ADULT NURSE REASSESSMENT NOTE - NS ED NURSE REASSESS COMMENT FT1
Report received from Nadiya SHANKS. Pt currently admitted to surgery. VSS. Will continue to monitor and assess while offering support and reassurance.

## 2017-10-27 ENCOUNTER — RESULT REVIEW (OUTPATIENT)
Age: 82
End: 2017-10-27

## 2017-10-27 LAB
ANION GAP SERPL CALC-SCNC: 14 MMOL/L — SIGNIFICANT CHANGE UP (ref 5–17)
BUN SERPL-MCNC: 10 MG/DL — SIGNIFICANT CHANGE UP (ref 7–23)
CALCIUM SERPL-MCNC: 8.9 MG/DL — SIGNIFICANT CHANGE UP (ref 8.4–10.5)
CEA SERPL-MCNC: 2.6 NG/ML — SIGNIFICANT CHANGE UP (ref 0–3.8)
CEA SERPL-MCNC: 3 NG/ML — SIGNIFICANT CHANGE UP (ref 0–3.8)
CHLORIDE SERPL-SCNC: 101 MMOL/L — SIGNIFICANT CHANGE UP (ref 96–108)
CO2 SERPL-SCNC: 25 MMOL/L — SIGNIFICANT CHANGE UP (ref 22–31)
CREAT SERPL-MCNC: 0.74 MG/DL — SIGNIFICANT CHANGE UP (ref 0.5–1.3)
GLUCOSE SERPL-MCNC: 120 MG/DL — HIGH (ref 70–99)
HCT VFR BLD CALC: 42 % — SIGNIFICANT CHANGE UP (ref 39–50)
HGB BLD-MCNC: 14.1 G/DL — SIGNIFICANT CHANGE UP (ref 13–17)
MAGNESIUM SERPL-MCNC: 1.9 MG/DL — SIGNIFICANT CHANGE UP (ref 1.6–2.6)
MCHC RBC-ENTMCNC: 30.7 PG — SIGNIFICANT CHANGE UP (ref 27–34)
MCHC RBC-ENTMCNC: 33.6 GM/DL — SIGNIFICANT CHANGE UP (ref 32–36)
MCV RBC AUTO: 91.5 FL — SIGNIFICANT CHANGE UP (ref 80–100)
PHOSPHATE SERPL-MCNC: 2.9 MG/DL — SIGNIFICANT CHANGE UP (ref 2.5–4.5)
PLATELET # BLD AUTO: 222 K/UL — SIGNIFICANT CHANGE UP (ref 150–400)
POTASSIUM SERPL-MCNC: 4.4 MMOL/L — SIGNIFICANT CHANGE UP (ref 3.5–5.3)
POTASSIUM SERPL-SCNC: 4.4 MMOL/L — SIGNIFICANT CHANGE UP (ref 3.5–5.3)
RBC # BLD: 4.59 M/UL — SIGNIFICANT CHANGE UP (ref 4.2–5.8)
RBC # FLD: 13.3 % — SIGNIFICANT CHANGE UP (ref 10.3–14.5)
SODIUM SERPL-SCNC: 140 MMOL/L — SIGNIFICANT CHANGE UP (ref 135–145)
WBC # BLD: 10.28 K/UL — SIGNIFICANT CHANGE UP (ref 3.8–10.5)
WBC # FLD AUTO: 10.28 K/UL — SIGNIFICANT CHANGE UP (ref 3.8–10.5)

## 2017-10-27 PROCEDURE — 45347 SIGMOIDOSCOPY W/PLCMT STENT: CPT

## 2017-10-27 PROCEDURE — 88342 IMHCHEM/IMCYTCHM 1ST ANTB: CPT | Mod: 26

## 2017-10-27 PROCEDURE — 44143 PARTIAL REMOVAL OF COLON: CPT

## 2017-10-27 PROCEDURE — 49020 DRAINAGE ABDOM ABSCESS OPEN: CPT | Mod: 59

## 2017-10-27 PROCEDURE — 71010: CPT | Mod: 26

## 2017-10-27 PROCEDURE — 44139 MOBILIZATION OF COLON: CPT

## 2017-10-27 PROCEDURE — 88305 TISSUE EXAM BY PATHOLOGIST: CPT | Mod: 26

## 2017-10-27 PROCEDURE — 88341 IMHCHEM/IMCYTCHM EA ADD ANTB: CPT | Mod: 26,59

## 2017-10-27 PROCEDURE — 88307 TISSUE EXAM BY PATHOLOGIST: CPT | Mod: 26

## 2017-10-27 RX ORDER — SODIUM CHLORIDE 9 MG/ML
1000 INJECTION INTRAMUSCULAR; INTRAVENOUS; SUBCUTANEOUS ONCE
Qty: 0 | Refills: 0 | Status: DISCONTINUED | OUTPATIENT
Start: 2017-10-27 | End: 2017-10-28

## 2017-10-27 RX ORDER — PIPERACILLIN AND TAZOBACTAM 4; .5 G/20ML; G/20ML
3.38 INJECTION, POWDER, LYOPHILIZED, FOR SOLUTION INTRAVENOUS EVERY 8 HOURS
Qty: 0 | Refills: 0 | Status: DISCONTINUED | OUTPATIENT
Start: 2017-10-27 | End: 2017-11-03

## 2017-10-27 RX ORDER — PIPERACILLIN AND TAZOBACTAM 4; .5 G/20ML; G/20ML
3.38 INJECTION, POWDER, LYOPHILIZED, FOR SOLUTION INTRAVENOUS ONCE
Qty: 0 | Refills: 0 | Status: COMPLETED | OUTPATIENT
Start: 2017-10-27 | End: 2017-10-27

## 2017-10-27 RX ORDER — ONDANSETRON 8 MG/1
4 TABLET, FILM COATED ORAL ONCE
Qty: 0 | Refills: 0 | Status: DISCONTINUED | OUTPATIENT
Start: 2017-10-27 | End: 2017-10-28

## 2017-10-27 RX ORDER — VANCOMYCIN HCL 1 G
1000 VIAL (EA) INTRAVENOUS ONCE
Qty: 0 | Refills: 0 | Status: COMPLETED | OUTPATIENT
Start: 2017-10-27 | End: 2017-10-27

## 2017-10-27 RX ORDER — HYDROMORPHONE HYDROCHLORIDE 2 MG/ML
0.5 INJECTION INTRAMUSCULAR; INTRAVENOUS; SUBCUTANEOUS
Qty: 0 | Refills: 0 | Status: DISCONTINUED | OUTPATIENT
Start: 2017-10-27 | End: 2017-10-28

## 2017-10-27 RX ORDER — VANCOMYCIN HCL 1 G
VIAL (EA) INTRAVENOUS
Qty: 0 | Refills: 0 | Status: DISCONTINUED | OUTPATIENT
Start: 2017-10-27 | End: 2017-10-29

## 2017-10-27 RX ORDER — POTASSIUM PHOSPHATE, MONOBASIC POTASSIUM PHOSPHATE, DIBASIC 236; 224 MG/ML; MG/ML
15 INJECTION, SOLUTION INTRAVENOUS ONCE
Qty: 0 | Refills: 0 | Status: DISCONTINUED | OUTPATIENT
Start: 2017-10-27 | End: 2017-10-27

## 2017-10-27 RX ORDER — ACETAMINOPHEN 500 MG
1000 TABLET ORAL ONCE
Qty: 0 | Refills: 0 | Status: COMPLETED | OUTPATIENT
Start: 2017-10-27 | End: 2017-10-27

## 2017-10-27 RX ORDER — MAGNESIUM SULFATE 500 MG/ML
1 VIAL (ML) INJECTION ONCE
Qty: 0 | Refills: 0 | Status: COMPLETED | OUTPATIENT
Start: 2017-10-27 | End: 2017-10-27

## 2017-10-27 RX ORDER — SODIUM CHLORIDE 9 MG/ML
1000 INJECTION INTRAMUSCULAR; INTRAVENOUS; SUBCUTANEOUS
Qty: 0 | Refills: 0 | Status: DISCONTINUED | OUTPATIENT
Start: 2017-10-27 | End: 2017-10-30

## 2017-10-27 RX ORDER — SODIUM CHLORIDE 9 MG/ML
1000 INJECTION INTRAMUSCULAR; INTRAVENOUS; SUBCUTANEOUS
Qty: 0 | Refills: 0 | Status: DISCONTINUED | OUTPATIENT
Start: 2017-10-27 | End: 2017-10-27

## 2017-10-27 RX ORDER — VANCOMYCIN HCL 1 G
1000 VIAL (EA) INTRAVENOUS EVERY 12 HOURS
Qty: 0 | Refills: 0 | Status: DISCONTINUED | OUTPATIENT
Start: 2017-10-28 | End: 2017-10-29

## 2017-10-27 RX ADMIN — ENOXAPARIN SODIUM 40 MILLIGRAM(S): 100 INJECTION SUBCUTANEOUS at 11:53

## 2017-10-27 RX ADMIN — HYDROMORPHONE HYDROCHLORIDE 0.5 MILLIGRAM(S): 2 INJECTION INTRAMUSCULAR; INTRAVENOUS; SUBCUTANEOUS at 22:00

## 2017-10-27 RX ADMIN — PIPERACILLIN AND TAZOBACTAM 200 GRAM(S): 4; .5 INJECTION, POWDER, LYOPHILIZED, FOR SOLUTION INTRAVENOUS at 22:17

## 2017-10-27 RX ADMIN — HYDROMORPHONE HYDROCHLORIDE 0.5 MILLIGRAM(S): 2 INJECTION INTRAMUSCULAR; INTRAVENOUS; SUBCUTANEOUS at 21:35

## 2017-10-27 RX ADMIN — Medication 400 MILLIGRAM(S): at 21:40

## 2017-10-27 RX ADMIN — MORPHINE SULFATE 4 MILLIGRAM(S): 50 CAPSULE, EXTENDED RELEASE ORAL at 02:15

## 2017-10-27 RX ADMIN — SODIUM CHLORIDE 100 MILLILITER(S): 9 INJECTION INTRAMUSCULAR; INTRAVENOUS; SUBCUTANEOUS at 21:45

## 2017-10-27 RX ADMIN — MORPHINE SULFATE 4 MILLIGRAM(S): 50 CAPSULE, EXTENDED RELEASE ORAL at 01:55

## 2017-10-27 RX ADMIN — HYDROMORPHONE HYDROCHLORIDE 0.5 MILLIGRAM(S): 2 INJECTION INTRAMUSCULAR; INTRAVENOUS; SUBCUTANEOUS at 22:40

## 2017-10-27 RX ADMIN — Medication 37.5 MICROGRAM(S): at 05:33

## 2017-10-27 RX ADMIN — SODIUM CHLORIDE 100 MILLILITER(S): 9 INJECTION INTRAMUSCULAR; INTRAVENOUS; SUBCUTANEOUS at 22:23

## 2017-10-27 RX ADMIN — HYDROMORPHONE HYDROCHLORIDE 0.5 MILLIGRAM(S): 2 INJECTION INTRAMUSCULAR; INTRAVENOUS; SUBCUTANEOUS at 23:00

## 2017-10-27 RX ADMIN — Medication 250 MILLIGRAM(S): at 23:00

## 2017-10-27 RX ADMIN — Medication 1000 MILLIGRAM(S): at 22:00

## 2017-10-27 NOTE — PROGRESS NOTE ADULT - SUBJECTIVE AND OBJECTIVE BOX
Surgery Progress Note    S: Patient seen and examined. No acute events overnight. Pain well controlled with current regimen. Denies nausea/vomiting. Endorses passing gas and bowel movements.     O:  Vital Signs Last 24 Hrs  T(C): 36.8 (27 Oct 2017 16:22), Max: 37.1 (26 Oct 2017 17:19)  T(F): 98.2 (27 Oct 2017 16:22), Max: 98.7 (26 Oct 2017 17:19)  HR: 78 (27 Oct 2017 16:22) (68 - 106)  BP: 144/88 (27 Oct 2017 16:22) (124/73 - 162/79)  BP(mean): --  RR: 18 (27 Oct 2017 16:22) (18 - 18)  SpO2: 97% (27 Oct 2017 16:22) (95% - 99%)    I&O's Detail    26 Oct 2017 07:01  -  27 Oct 2017 07:00  --------------------------------------------------------  IN:    lactated ringers.: 1000 mL  Total IN: 1000 mL    OUT:    Voided: 600 mL  Total OUT: 600 mL    Total NET: 400 mL      27 Oct 2017 07:01  -  27 Oct 2017 17:12  --------------------------------------------------------  IN:    lactated ringers.: 1000 mL  Total IN: 1000 mL    OUT:  Total OUT: 0 mL    Total NET: 1000 mL          MEDICATIONS  (STANDING):  enoxaparin Injectable 40 milliGRAM(s) SubCutaneous daily  lactated ringers. 1000 milliLiter(s) (100 mL/Hr) IV Continuous <Continuous>  levothyroxine Injectable 37.5 MICROGram(s) IV Push daily  magnesium sulfate  IVPB 1 Gram(s) IV Intermittent once  potassium phosphate IVPB 15 milliMole(s) IV Intermittent once  simvastatin 40 milliGRAM(s) Oral at bedtime    MEDICATIONS  (PRN):  morphine  - Injectable 2 milliGRAM(s) IV Push every 4 hours PRN Moderate Pain (4 - 6)  morphine  - Injectable 4 milliGRAM(s) IV Push every 4 hours PRN Severe Pain (7 - 10)                            14.1   10.28 )-----------( 222      ( 27 Oct 2017 08:33 )             42.0       10-27    140  |  101  |  10  ----------------------------<  120<H>  4.4   |  25  |  0.74    Ca    8.9      27 Oct 2017 08:26  Phos  2.9     10-27  Mg     1.9     10-27    TPro  6.9  /  Alb  4.4  /  TBili  0.4  /  DBili  x   /  AST  19  /  ALT  14  /  AlkPhos  99  10-26      Physical Exam:  Gen: Laying in bed, NAD, alert and oriented.   Resp: Unlabored breathing  Abd: soft, non-distended, TTP in LUQ

## 2017-10-27 NOTE — PROGRESS NOTE ADULT - ASSESSMENT
82M PHx HLD & hypothyroidism w/ abdominal pain x1 day, no BM x1week, found to have LBO at splenic flexure w/ evidence of pancreatic mass, peritoneal & lung nodules,. Plan for endoscopy from below by GI to attempt decompression & further evaluate possible mass at splenic flexure.       -To GI today for flexible sigmoidoscopy, possible colonic stent placement  -NPO  -IVF  -DVT ppx  -Home meds  -obtain recent colonoscopy report  -Pain control  -CT chest w/ IV contrast to evaluate for metastatic disease

## 2017-10-27 NOTE — PROGRESS NOTE ADULT - SUBJECTIVE AND OBJECTIVE BOX
Pre-Endoscopy Evaluation      Referring Physician:  Dr. Lamont Elizondo                                Procedure: Flexible Sigmoidoscopy    Indication for Procedure: Large Bowel Obstruction    Pertinent History:  82 year old male with hx of  HLD and Hypothyroid presenting with abdominal pain and constipation x 1 week. Found to have Large bowel obstruction at splenic flexure and diverticulosis on CT scan.       Sedation by Anesthesia [X]    PAST MEDICAL & SURGICAL HISTORY:  Hypothyroid  Hyperlipemia  Colonic polyps/Polypectomy  No significant past surgical history      PMH of Gastroparesis [ ]  Gastric Surgery [ ]  Gastric Outlet Obstruction [ ]    Allergies:    No Known Allergies    Intolerances:    Latex allergy: [ ] yes [x] no    Medications:MEDICATIONS  (STANDING):  enoxaparin Injectable 40 milliGRAM(s) SubCutaneous daily  lactated ringers. 1000 milliLiter(s) (100 mL/Hr) IV Continuous <Continuous>  levothyroxine Injectable 37.5 MICROGram(s) IV Push daily  simvastatin 40 milliGRAM(s) Oral at bedtime    MEDICATIONS  (PRN):  morphine  - Injectable 2 milliGRAM(s) IV Push every 4 hours PRN Moderate Pain (4 - 6)  morphine  - Injectable 4 milliGRAM(s) IV Push every 4 hours PRN Severe Pain (7 - 10)      Smoking: [ ] yes  [x] no    AICD/PPM: [ ] yes   [x] no    Pertinent lab data:                        14.1   10.28 )-----------( 222      ( 27 Oct 2017 08:33 )             42.0     10-27    140  |  101  |  10  ----------------------------<  120<H>  4.4   |  25  |  0.74    Ca    8.9      27 Oct 2017 08:26  Phos  2.9     10-27  Mg     1.9     10-27    TPro  6.9  /  Alb  4.4  /  TBili  0.4  /  DBili  x   /  AST  19  /  ALT  14  /  AlkPhos  99  10-26    PT/INR - ( 26 Oct 2017 15:29 )   PT: 11.3 sec;   INR: 1.04 ratio      PTT - ( 26 Oct 2017 15:29 )  PTT:28.3 sec        Physical Examination:     Daily   Vital Signs Last 24 Hrs  T(C): 37.1 (27 Oct 2017 14:00), Max: 37.1 (26 Oct 2017 17:19)  T(F): 98.7 (27 Oct 2017 14:00), Max: 98.7 (26 Oct 2017 17:19)  HR: 69 (27 Oct 2017 14:00) (68 - 106)  BP: 124/73 (27 Oct 2017 14:00) (124/73 - 162/79)  BP(mean): --  RR: 18 (27 Oct 2017 14:00) (18 - 18)  SpO2: 96% (27 Oct 2017 14:00) (95% - 99%)      Constitutional: NAD    Neck:  No JVD    Respiratory: CTAB/L    Cardiovascular: S1 and S2    Gastrointestinal: BS+, soft, NT/ND    Extremities: No peripheral edema    : No Miles    Skin: No rashes    Comments:    ASA Class: I [ ]  II [ ]  III [ ]  IV [x]    The patient is a suitable candidate for the planned procedure unless box checked [ ]  No, explain:

## 2017-10-28 LAB
ANION GAP SERPL CALC-SCNC: 11 MMOL/L — SIGNIFICANT CHANGE UP (ref 5–17)
ANION GAP SERPL CALC-SCNC: 14 MMOL/L — SIGNIFICANT CHANGE UP (ref 5–17)
ANION GAP SERPL CALC-SCNC: 18 MMOL/L — HIGH (ref 5–17)
BUN SERPL-MCNC: 14 MG/DL — SIGNIFICANT CHANGE UP (ref 7–23)
CALCIUM SERPL-MCNC: 6.6 MG/DL — LOW (ref 8.4–10.5)
CALCIUM SERPL-MCNC: 7 MG/DL — LOW (ref 8.4–10.5)
CALCIUM SERPL-MCNC: 7.5 MG/DL — LOW (ref 8.4–10.5)
CANCER AG19-9 SERPL-ACNC: <1 U/ML — SIGNIFICANT CHANGE UP
CANCER AG19-9 SERPL-ACNC: <1 U/ML — SIGNIFICANT CHANGE UP
CHLORIDE SERPL-SCNC: 102 MMOL/L — SIGNIFICANT CHANGE UP (ref 96–108)
CHLORIDE SERPL-SCNC: 107 MMOL/L — SIGNIFICANT CHANGE UP (ref 96–108)
CHLORIDE SERPL-SCNC: 108 MMOL/L — SIGNIFICANT CHANGE UP (ref 96–108)
CO2 SERPL-SCNC: 17 MMOL/L — LOW (ref 22–31)
CO2 SERPL-SCNC: 17 MMOL/L — LOW (ref 22–31)
CO2 SERPL-SCNC: 18 MMOL/L — LOW (ref 22–31)
CREAT SERPL-MCNC: 0.72 MG/DL — SIGNIFICANT CHANGE UP (ref 0.5–1.3)
CREAT SERPL-MCNC: 0.72 MG/DL — SIGNIFICANT CHANGE UP (ref 0.5–1.3)
CREAT SERPL-MCNC: 0.8 MG/DL — SIGNIFICANT CHANGE UP (ref 0.5–1.3)
GAS PNL BLDA: SIGNIFICANT CHANGE UP
GLUCOSE SERPL-MCNC: 144 MG/DL — HIGH (ref 70–99)
GLUCOSE SERPL-MCNC: 199 MG/DL — HIGH (ref 70–99)
GLUCOSE SERPL-MCNC: 212 MG/DL — HIGH (ref 70–99)
HCT VFR BLD CALC: 34.9 % — LOW (ref 39–50)
HCT VFR BLD CALC: 38.2 % — LOW (ref 39–50)
HCT VFR BLD CALC: 45.5 % — SIGNIFICANT CHANGE UP (ref 39–50)
HGB BLD-MCNC: 12.1 G/DL — LOW (ref 13–17)
HGB BLD-MCNC: 12.5 G/DL — LOW (ref 13–17)
HGB BLD-MCNC: 14.9 G/DL — SIGNIFICANT CHANGE UP (ref 13–17)
MAGNESIUM SERPL-MCNC: 1.6 MG/DL — SIGNIFICANT CHANGE UP (ref 1.6–2.6)
MAGNESIUM SERPL-MCNC: 1.6 MG/DL — SIGNIFICANT CHANGE UP (ref 1.6–2.6)
MAGNESIUM SERPL-MCNC: 3 MG/DL — HIGH (ref 1.6–2.6)
MCHC RBC-ENTMCNC: 31 PG — SIGNIFICANT CHANGE UP (ref 27–34)
MCHC RBC-ENTMCNC: 31 PG — SIGNIFICANT CHANGE UP (ref 27–34)
MCHC RBC-ENTMCNC: 32.3 PG — SIGNIFICANT CHANGE UP (ref 27–34)
MCHC RBC-ENTMCNC: 32.8 GM/DL — SIGNIFICANT CHANGE UP (ref 32–36)
MCHC RBC-ENTMCNC: 32.8 GM/DL — SIGNIFICANT CHANGE UP (ref 32–36)
MCHC RBC-ENTMCNC: 34.5 GM/DL — SIGNIFICANT CHANGE UP (ref 32–36)
MCV RBC AUTO: 93.5 FL — SIGNIFICANT CHANGE UP (ref 80–100)
MCV RBC AUTO: 94.4 FL — SIGNIFICANT CHANGE UP (ref 80–100)
MCV RBC AUTO: 94.6 FL — SIGNIFICANT CHANGE UP (ref 80–100)
PHOSPHATE SERPL-MCNC: 3.2 MG/DL — SIGNIFICANT CHANGE UP (ref 2.5–4.5)
PHOSPHATE SERPL-MCNC: 3.6 MG/DL — SIGNIFICANT CHANGE UP (ref 2.5–4.5)
PHOSPHATE SERPL-MCNC: 3.9 MG/DL — SIGNIFICANT CHANGE UP (ref 2.5–4.5)
PLATELET # BLD AUTO: 187 K/UL — SIGNIFICANT CHANGE UP (ref 150–400)
PLATELET # BLD AUTO: 249 K/UL — SIGNIFICANT CHANGE UP (ref 150–400)
PLATELET # BLD AUTO: 265 K/UL — SIGNIFICANT CHANGE UP (ref 150–400)
POTASSIUM SERPL-MCNC: 3.1 MMOL/L — LOW (ref 3.5–5.3)
POTASSIUM SERPL-MCNC: 3.5 MMOL/L — SIGNIFICANT CHANGE UP (ref 3.5–5.3)
POTASSIUM SERPL-MCNC: 4.4 MMOL/L — SIGNIFICANT CHANGE UP (ref 3.5–5.3)
POTASSIUM SERPL-SCNC: 3.1 MMOL/L — LOW (ref 3.5–5.3)
POTASSIUM SERPL-SCNC: 3.5 MMOL/L — SIGNIFICANT CHANGE UP (ref 3.5–5.3)
POTASSIUM SERPL-SCNC: 4.4 MMOL/L — SIGNIFICANT CHANGE UP (ref 3.5–5.3)
RBC # BLD: 3.74 M/UL — LOW (ref 4.2–5.8)
RBC # BLD: 4.04 M/UL — LOW (ref 4.2–5.8)
RBC # BLD: 4.82 M/UL — SIGNIFICANT CHANGE UP (ref 4.2–5.8)
RBC # FLD: 12.1 % — SIGNIFICANT CHANGE UP (ref 10.3–14.5)
RBC # FLD: 12.2 % — SIGNIFICANT CHANGE UP (ref 10.3–14.5)
RBC # FLD: 12.2 % — SIGNIFICANT CHANGE UP (ref 10.3–14.5)
SODIUM SERPL-SCNC: 136 MMOL/L — SIGNIFICANT CHANGE UP (ref 135–145)
SODIUM SERPL-SCNC: 138 MMOL/L — SIGNIFICANT CHANGE UP (ref 135–145)
SODIUM SERPL-SCNC: 138 MMOL/L — SIGNIFICANT CHANGE UP (ref 135–145)
WBC # BLD: 2.4 K/UL — LOW (ref 3.8–10.5)
WBC # BLD: 3.7 K/UL — LOW (ref 3.8–10.5)
WBC # BLD: 8.9 K/UL — SIGNIFICANT CHANGE UP (ref 3.8–10.5)
WBC # FLD AUTO: 2.4 K/UL — LOW (ref 3.8–10.5)
WBC # FLD AUTO: 3.7 K/UL — LOW (ref 3.8–10.5)
WBC # FLD AUTO: 8.9 K/UL — SIGNIFICANT CHANGE UP (ref 3.8–10.5)

## 2017-10-28 PROCEDURE — 36556 INSERT NON-TUNNEL CV CATH: CPT

## 2017-10-28 PROCEDURE — 99291 CRITICAL CARE FIRST HOUR: CPT

## 2017-10-28 PROCEDURE — 99291 CRITICAL CARE FIRST HOUR: CPT | Mod: 25

## 2017-10-28 PROCEDURE — 71010: CPT | Mod: 26

## 2017-10-28 PROCEDURE — 99232 SBSQ HOSP IP/OBS MODERATE 35: CPT | Mod: GC

## 2017-10-28 RX ORDER — PHENYLEPHRINE HYDROCHLORIDE 10 MG/ML
0.2 INJECTION INTRAVENOUS
Qty: 80 | Refills: 0 | Status: DISCONTINUED | OUTPATIENT
Start: 2017-10-28 | End: 2017-10-28

## 2017-10-28 RX ORDER — PANTOPRAZOLE SODIUM 20 MG/1
40 TABLET, DELAYED RELEASE ORAL DAILY
Qty: 0 | Refills: 0 | Status: DISCONTINUED | OUTPATIENT
Start: 2017-10-28 | End: 2017-10-30

## 2017-10-28 RX ORDER — POTASSIUM CHLORIDE 20 MEQ
10 PACKET (EA) ORAL
Qty: 0 | Refills: 0 | Status: COMPLETED | OUTPATIENT
Start: 2017-10-28 | End: 2017-10-28

## 2017-10-28 RX ORDER — HYDROMORPHONE HYDROCHLORIDE 2 MG/ML
0.5 INJECTION INTRAMUSCULAR; INTRAVENOUS; SUBCUTANEOUS EVERY 4 HOURS
Qty: 0 | Refills: 0 | Status: DISCONTINUED | OUTPATIENT
Start: 2017-10-28 | End: 2017-10-30

## 2017-10-28 RX ORDER — HYDROMORPHONE HYDROCHLORIDE 2 MG/ML
1 INJECTION INTRAMUSCULAR; INTRAVENOUS; SUBCUTANEOUS EVERY 6 HOURS
Qty: 0 | Refills: 0 | Status: DISCONTINUED | OUTPATIENT
Start: 2017-10-28 | End: 2017-10-29

## 2017-10-28 RX ORDER — SODIUM CHLORIDE 9 MG/ML
500 INJECTION INTRAMUSCULAR; INTRAVENOUS; SUBCUTANEOUS ONCE
Qty: 0 | Refills: 0 | Status: COMPLETED | OUTPATIENT
Start: 2017-10-28 | End: 2017-10-28

## 2017-10-28 RX ORDER — SODIUM CHLORIDE 9 MG/ML
1000 INJECTION INTRAMUSCULAR; INTRAVENOUS; SUBCUTANEOUS ONCE
Qty: 0 | Refills: 0 | Status: COMPLETED | OUTPATIENT
Start: 2017-10-28 | End: 2017-10-28

## 2017-10-28 RX ORDER — MAGNESIUM SULFATE 500 MG/ML
2 VIAL (ML) INJECTION
Qty: 0 | Refills: 0 | Status: COMPLETED | OUTPATIENT
Start: 2017-10-28 | End: 2017-10-28

## 2017-10-28 RX ORDER — DEXMEDETOMIDINE HYDROCHLORIDE IN 0.9% SODIUM CHLORIDE 4 UG/ML
1 INJECTION INTRAVENOUS
Qty: 200 | Refills: 0 | Status: DISCONTINUED | OUTPATIENT
Start: 2017-10-28 | End: 2017-10-29

## 2017-10-28 RX ORDER — ACETAMINOPHEN 500 MG
1000 TABLET ORAL ONCE
Qty: 0 | Refills: 0 | Status: COMPLETED | OUTPATIENT
Start: 2017-10-28 | End: 2017-10-28

## 2017-10-28 RX ORDER — LEVOTHYROXINE SODIUM 125 MCG
37.5 TABLET ORAL DAILY
Qty: 0 | Refills: 0 | Status: DISCONTINUED | OUTPATIENT
Start: 2017-10-28 | End: 2017-10-30

## 2017-10-28 RX ORDER — SODIUM CHLORIDE 9 MG/ML
1000 INJECTION, SOLUTION INTRAVENOUS ONCE
Qty: 0 | Refills: 0 | Status: COMPLETED | OUTPATIENT
Start: 2017-10-28 | End: 2017-10-28

## 2017-10-28 RX ORDER — NOREPINEPHRINE BITARTRATE/D5W 8 MG/250ML
0.01 PLASTIC BAG, INJECTION (ML) INTRAVENOUS
Qty: 8 | Refills: 0 | Status: DISCONTINUED | OUTPATIENT
Start: 2017-10-28 | End: 2017-10-30

## 2017-10-28 RX ORDER — INSULIN LISPRO 100/ML
VIAL (ML) SUBCUTANEOUS EVERY 6 HOURS
Qty: 0 | Refills: 0 | Status: DISCONTINUED | OUTPATIENT
Start: 2017-10-28 | End: 2017-10-31

## 2017-10-28 RX ADMIN — Medication 100 MILLIEQUIVALENT(S): at 11:23

## 2017-10-28 RX ADMIN — Medication 1.33 MICROGRAM(S)/KG/MIN: at 20:21

## 2017-10-28 RX ADMIN — PHENYLEPHRINE HYDROCHLORIDE 4.42 MICROGRAM(S)/KG/MIN: 10 INJECTION INTRAVENOUS at 11:43

## 2017-10-28 RX ADMIN — SODIUM CHLORIDE 2000 MILLILITER(S): 9 INJECTION INTRAMUSCULAR; INTRAVENOUS; SUBCUTANEOUS at 10:00

## 2017-10-28 RX ADMIN — SODIUM CHLORIDE 2000 MILLILITER(S): 9 INJECTION INTRAMUSCULAR; INTRAVENOUS; SUBCUTANEOUS at 03:59

## 2017-10-28 RX ADMIN — DEXMEDETOMIDINE HYDROCHLORIDE IN 0.9% SODIUM CHLORIDE 14.75 MICROGRAM(S)/KG/HR: 4 INJECTION INTRAVENOUS at 02:55

## 2017-10-28 RX ADMIN — Medication 250 MILLIGRAM(S): at 17:14

## 2017-10-28 RX ADMIN — HYDROMORPHONE HYDROCHLORIDE 1 MILLIGRAM(S): 2 INJECTION INTRAMUSCULAR; INTRAVENOUS; SUBCUTANEOUS at 21:05

## 2017-10-28 RX ADMIN — HYDROMORPHONE HYDROCHLORIDE 1 MILLIGRAM(S): 2 INJECTION INTRAMUSCULAR; INTRAVENOUS; SUBCUTANEOUS at 10:50

## 2017-10-28 RX ADMIN — PIPERACILLIN AND TAZOBACTAM 25 GRAM(S): 4; .5 INJECTION, POWDER, LYOPHILIZED, FOR SOLUTION INTRAVENOUS at 22:29

## 2017-10-28 RX ADMIN — Medication 400 MILLIGRAM(S): at 03:30

## 2017-10-28 RX ADMIN — HYDROMORPHONE HYDROCHLORIDE 1 MILLIGRAM(S): 2 INJECTION INTRAMUSCULAR; INTRAVENOUS; SUBCUTANEOUS at 03:00

## 2017-10-28 RX ADMIN — Medication 100 MILLIEQUIVALENT(S): at 13:30

## 2017-10-28 RX ADMIN — PIPERACILLIN AND TAZOBACTAM 25 GRAM(S): 4; .5 INJECTION, POWDER, LYOPHILIZED, FOR SOLUTION INTRAVENOUS at 05:32

## 2017-10-28 RX ADMIN — DEXMEDETOMIDINE HYDROCHLORIDE IN 0.9% SODIUM CHLORIDE 14.75 MICROGRAM(S)/KG/HR: 4 INJECTION INTRAVENOUS at 20:22

## 2017-10-28 RX ADMIN — SODIUM CHLORIDE 125 MILLILITER(S): 9 INJECTION INTRAMUSCULAR; INTRAVENOUS; SUBCUTANEOUS at 20:22

## 2017-10-28 RX ADMIN — SODIUM CHLORIDE 4000 MILLILITER(S): 9 INJECTION, SOLUTION INTRAVENOUS at 12:00

## 2017-10-28 RX ADMIN — DEXMEDETOMIDINE HYDROCHLORIDE IN 0.9% SODIUM CHLORIDE 14.75 MICROGRAM(S)/KG/HR: 4 INJECTION INTRAVENOUS at 07:30

## 2017-10-28 RX ADMIN — Medication 50 GRAM(S): at 12:23

## 2017-10-28 RX ADMIN — HYDROMORPHONE HYDROCHLORIDE 0.5 MILLIGRAM(S): 2 INJECTION INTRAMUSCULAR; INTRAVENOUS; SUBCUTANEOUS at 03:59

## 2017-10-28 RX ADMIN — SODIUM CHLORIDE 125 MILLILITER(S): 9 INJECTION INTRAMUSCULAR; INTRAVENOUS; SUBCUTANEOUS at 05:33

## 2017-10-28 RX ADMIN — Medication 1 MILLIGRAM(S): at 17:14

## 2017-10-28 RX ADMIN — Medication 1.33 MICROGRAM(S)/KG/MIN: at 13:45

## 2017-10-28 RX ADMIN — HYDROMORPHONE HYDROCHLORIDE 0.5 MILLIGRAM(S): 2 INJECTION INTRAMUSCULAR; INTRAVENOUS; SUBCUTANEOUS at 04:15

## 2017-10-28 RX ADMIN — PIPERACILLIN AND TAZOBACTAM 25 GRAM(S): 4; .5 INJECTION, POWDER, LYOPHILIZED, FOR SOLUTION INTRAVENOUS at 14:15

## 2017-10-28 RX ADMIN — SODIUM CHLORIDE 125 MILLILITER(S): 9 INJECTION INTRAMUSCULAR; INTRAVENOUS; SUBCUTANEOUS at 07:30

## 2017-10-28 RX ADMIN — Medication 1000 MILLIGRAM(S): at 04:00

## 2017-10-28 RX ADMIN — Medication 100 MILLIEQUIVALENT(S): at 12:25

## 2017-10-28 RX ADMIN — Medication 50 GRAM(S): at 11:15

## 2017-10-28 RX ADMIN — Medication 37.5 MICROGRAM(S): at 06:38

## 2017-10-28 RX ADMIN — HYDROMORPHONE HYDROCHLORIDE 1 MILLIGRAM(S): 2 INJECTION INTRAMUSCULAR; INTRAVENOUS; SUBCUTANEOUS at 10:36

## 2017-10-28 RX ADMIN — SODIUM CHLORIDE 100 MILLILITER(S): 9 INJECTION INTRAMUSCULAR; INTRAVENOUS; SUBCUTANEOUS at 02:55

## 2017-10-28 RX ADMIN — HYDROMORPHONE HYDROCHLORIDE 1 MILLIGRAM(S): 2 INJECTION INTRAMUSCULAR; INTRAVENOUS; SUBCUTANEOUS at 03:15

## 2017-10-28 RX ADMIN — ENOXAPARIN SODIUM 40 MILLIGRAM(S): 100 INJECTION SUBCUTANEOUS at 12:23

## 2017-10-28 RX ADMIN — Medication 250 MILLIGRAM(S): at 05:09

## 2017-10-28 RX ADMIN — SODIUM CHLORIDE 1000 MILLILITER(S): 9 INJECTION INTRAMUSCULAR; INTRAVENOUS; SUBCUTANEOUS at 05:33

## 2017-10-28 RX ADMIN — HYDROMORPHONE HYDROCHLORIDE 1 MILLIGRAM(S): 2 INJECTION INTRAMUSCULAR; INTRAVENOUS; SUBCUTANEOUS at 21:25

## 2017-10-28 NOTE — CONSULT NOTE ADULT - ASSESSMENT
ASSESSMENT:  82y Male with peritoneal carcinomatosis, colonic perforation following sigmoidoscopy now s/p resection of transverse colon and proximal left colon with end colostomy.    PLAN:   Neurologic:   - C/w sedation as needed for ventilator  - Postoperative pain control PRN    Respiratory:   - C/w mechanical ventilation  - Wean to extubate    Cardiovascular:   - No acute issues    Gastrointestinal/Nutrition:   - NPO    Renal/Genitourinary:   - C/w Miles    Hematologic:   - Lovenox for DVT ppx    Infectious Disease:   - Vancomycin and Zosyn for fecal peritonitis    Lines/Tubes:  - PIV, arterial line, Miles    Endocrine:   - C/w Synthroid    Disposition: SICU

## 2017-10-28 NOTE — BRIEF OPERATIVE NOTE - OPERATION/FINDINGS
Perforation of the transverse colon with stool spillage in abdomen. Large serosal tear of transverse found upon entry. Omental metastases, small bowel metastases present. Resection of majority of transverse and proximal left colon, end colostomy.

## 2017-10-28 NOTE — PROGRESS NOTE ADULT - SUBJECTIVE AND OBJECTIVE BOX
GASTROENTEROLOGY FOLLOW-UP NOTE    Chief Complaint:  Patient is a 82y old  Male who presents with a chief complaint of LBO, no BM x2wk (26 Oct 2017 16:16)      Interval Events:   [] Flex sig with placement of stent done yesterday.  [] Abdominal pain post-procedurally with penumoperitoneum on imaging .  [] Patient taken to OR overnight for hemicolectomy.  [] Intubated in SICU this AM. Awake, alert, following commands.     Allergies:  No Known Allergies      Hospital Medications:  dexmedetomidine Infusion 1 MICROgram(s)/kG/Hr IV Continuous <Continuous>  enoxaparin Injectable 40 milliGRAM(s) SubCutaneous daily  HYDROmorphone  Injectable 0.5 milliGRAM(s) IV Push every 4 hours PRN  HYDROmorphone  Injectable 1 milliGRAM(s) IV Push every 6 hours PRN  levothyroxine Injectable 37.5 MICROGram(s) IV Push daily  magnesium sulfate  IVPB 1 Gram(s) IV Intermittent once  piperacillin/tazobactam IVPB. 3.375 Gram(s) IV Intermittent every 8 hours  simvastatin 40 milliGRAM(s) Oral at bedtime  sodium chloride 0.9%. 1000 milliLiter(s) IV Continuous <Continuous>  vancomycin  IVPB 1000 milliGRAM(s) IV Intermittent every 12 hours  vancomycin  IVPB          PMHX/PSHX:  Hypothyroid  Hyperlipemia  No significant past surgical history      Family history:  No pertinent family history in first degree relatives      ROS:     General:  No wt loss, fevers, chills, night sweats, fatigue,   Eyes:  Good vision, no reported pain  ENT:  No sore throat, pain, runny nose, dysphagia  CV:  No pain, palpitations, hypo/hypertension  Resp:  No dyspnea, cough, tachypnea, wheezing  GI:  see HPI  :  No pain, bleeding, incontinence, nocturia  Muscle:  No pain, weakness  Neuro:  No weakness, tingling, memory problems  Psych:  No fatigue, insomnia, mood problems, depression  Endocrine:  No polyuria, polydipsia, cold/heat intolerance  Heme:  No petechiae, ecchymosis, easy bruisability  Skin:  No rash, tattoos, scars, edema      PHYSICAL EXAM:   Vital Signs:  Vital Signs Last 24 Hrs  T(C): 36.4 (28 Oct 2017 03:00), Max: 37.1 (27 Oct 2017 14:00)  T(F): 97.5 (28 Oct 2017 03:00), Max: 98.7 (27 Oct 2017 14:00)  HR: 82 (28 Oct 2017 08:08) (68 - 125)  BP: 95/62 (28 Oct 2017 06:00) (88/63 - 174/84)  BP(mean): 74 (28 Oct 2017 06:00) (69 - 119)  RR: 15 (28 Oct 2017 07:00) (14 - 26)  SpO2: 100% (28 Oct 2017 08:08) (96% - 100%)  Daily     Daily Weight in k.6 (28 Oct 2017 05:00)    GENERAL:  no acute distress  HEENT:  -icterus, ETT in place   CHEST:  clear bilaterally, no wheezes or rales  HEART:  RRR, S1S2  ABDOMEN:  soft, surgical dressing/colostomy in place   EXTEREMITIES:  no  edema  SKIN:  No rash/erythema/ecchymoses/petechiae/wounds/abscess/warm/dry  NEURO:  alert, oriented    LABS:                        14.9   2.4   )-----------( 265      ( 28 Oct 2017 03:35 )             45.5     10-28    138  |  102  |  14  ----------------------------<  212<H>  3.5   |  18<L>  |  0.72    Ca    7.5<L>      28 Oct 2017 03:35  Phos  3.9     10-28  Mg     1.6     10-28        PT/INR - ( 26 Oct 2017 15:29 )   PT: 11.3 sec;   INR: 1.04 ratio         PTT - ( 26 Oct 2017 15:29 )  PTT:28.3 sec  Urinalysis Basic - ( 26 Oct 2017 11:25 )    Color: Yellow / Appearance: Clear / S.026 / pH: x  Gluc: x / Ketone: Negative  / Bili: Negative / Urobili: Negative   Blood: x / Protein: Trace / Nitrite: Negative   Leuk Esterase: Negative / RBC: 0-2 /HPF / WBC 0-2 /HPF   Sq Epi: x / Non Sq Epi: OCC /HPF / Bacteria: Few /HPF          Imaging:

## 2017-10-28 NOTE — PROGRESS NOTE ADULT - ASSESSMENT
ASSESSMENT:  82y Male with peritoneal carcinomatosis, colonic perforation following sigmoidoscopy now s/p resection of transverse colon and proximal left colon with end colostomy.    PLAN:   - C/w sedation as needed for ventilator  - Postoperative pain control PRN  - C/w mechanical ventilation  - Wean to extubate  - NPO  - C/w Miles  - Lovenox for DVT ppx  - Vancomycin and Zosyn for fecal peritonitis  - Management per SICU team    Patient seen and examined with Dr. Ambrose Villarreal, PGY-1  [facility] Surgery [phone]

## 2017-10-28 NOTE — PROGRESS NOTE ADULT - ASSESSMENT
Impression:  1) Large bowel obstruction (transition point at hepatic flexure) secondary to pancreatic tail mass s/p flex sig with stent placement complicated by pneumoperitoneum now s/p extended left hemicolectomy, washout, end colostomy    Plan:   - per SICU, plan for trial of CPAP today  - continue antibiotics, pain control  - remainder of plan per surgery/SICU

## 2017-10-28 NOTE — PROCEDURE NOTE - NSPOSTCAREGUIDE_GEN_A_CORE
Verbal/written post procedure instructions were given to patient/caregiver/Care for catheter as per unit/ICU protocols
Care for catheter as per unit/ICU protocols

## 2017-10-28 NOTE — PROGRESS NOTE ADULT - SUBJECTIVE AND OBJECTIVE BOX
SICU attending    Patient seen and examined on AM rounds with house staff  Awake, alert, following commands  Oxygenating well on ventilator  Hemodynamically acutely worsening this AM.  Becoming increasingly hypotensive despite fluid boluses.  Also with metabolic acidosis and increasing lactate.  Hemodynamic compromise likely secondary to peritonitis and septic shock  - Source control achieved in OR last night  - On appropriate IV antibiotics  - Will place central line for levophed infusion  - Continue to fluid resuscitation while trending SVV and other parameters of perfusion  - Not a candidate for spontaneous breathing trial this AM due to need for pressors and worsening hemodynamic state    - Care discussed with Dr. Boggs this AM - prognosis very poor from oncologic standpoint (likely metastatic pancreatic cancer).  = 35 minutes direct critical care at bedside for acute decompensation this AM

## 2017-10-28 NOTE — CONSULT NOTE ADULT - SUBJECTIVE AND OBJECTIVE BOX
SICU Consultation Note  =====================================================  HPI:  82M PHx HLD & hypothyroidism w/out PSHx presented w/ abdominal pain, found to have LBO at splenic flexure w/ evidence of pancreatic mass, peritoneal & lung nodules.     Surgery Information  OR time:      EBL:       UOP:              IV Fluids:       Blood Products:             PAST MEDICAL & SURGICAL HISTORY:  Hypothyroid  Hyperlipemia  No significant past surgical history    Home Meds:   Allergies:   Soc:   Advanced Directives: Presumed Full Code     ROS:    General: Non-Contributory  Skin/Breast: Non-Contributory  Ophthalmologic: Non-Contributory  ENMT: Non-Contributory  Respiratory and Thorax: Non-Contributory  Cardiovascular: Non-Contributory  Gastrointestinal: Non-Contributory  Genitourinary: Non-Contributory  Musculoskeletal: Non-Contributory  Neurological: Non-Contributory  Psychiatric: Non-Contributory  Hematology/Lymphatics: Non-Contributory  Endocrine: Non-Contributory  Allergic/Immunologic: Non-Contributory    CURRENT MEDICATIONS:   --------------------------------------------------------------------------------------  Neurologic Medications  acetaminophen  IVPB. 1000 milliGRAM(s) IV Intermittent once PRN Mild Pain (1 - 3)  dexmedetomidine Infusion 1 MICROgram(s)/kG/Hr IV Continuous <Continuous>  HYDROmorphone  Injectable 0.5 milliGRAM(s) IV Push every 4 hours PRN Moderate Pain (4 - 6)  HYDROmorphone  Injectable 1 milliGRAM(s) IV Push every 6 hours PRN Severe Pain (7 - 10)    Respiratory Medications    Cardiovascular Medications    Gastrointestinal Medications  magnesium sulfate  IVPB 1 Gram(s) IV Intermittent once  sodium chloride 0.9%. 1000 milliLiter(s) IV Continuous <Continuous>    Genitourinary Medications    Hematologic/Oncologic Medications  enoxaparin Injectable 40 milliGRAM(s) SubCutaneous daily    Antimicrobial/Immunologic Medications  piperacillin/tazobactam IVPB. 3.375 Gram(s) IV Intermittent every 8 hours  vancomycin  IVPB 1000 milliGRAM(s) IV Intermittent every 12 hours  vancomycin  IVPB        Endocrine/Metabolic Medications  levothyroxine Injectable 37.5 MICROGram(s) IV Push daily  levothyroxine Injectable 37.5 MICROGram(s) IV Push daily  simvastatin 40 milliGRAM(s) Oral at bedtime    Topical/Other Medications    --------------------------------------------------------------------------------------    VITAL SIGNS, INS/OUTS (last 24 hours):  --------------------------------------------------------------------------------------  ((Insert SICU Vitals / Is+Os here)) ***  --------------------------------------------------------------------------------------    EXAM:  General/Neuro  RASS:   GCS:   Exam: Normal, NAD, alert, oriented x 3, no focal deficits. PERRLA  ***    Respiratory  Exam: Lungs clear to auscultation, Normal expansion/effort.  ***  [] Tracheostomy   [] Intubated  Mechanical Ventilation: Mode: AC/ CMV (Assist Control/ Continuous Mandatory Ventilation), RR (machine): 14, TV (machine): 450, FiO2: 40, PEEP: 5, ITime: 1, MAP: 7, PIP: 10    Cardiovascular  Exam: S1, S2.  Regular rate and rhythm.  Peripheral edema  ***  Cardiac Rhythm: Normal Sinus Rhythm  ECHO:     GI  Exam: Abdomen soft, Non-tender, Non-distended.  Gastrostomy / Jejunostomy tube in place.  Nasogastric tube in place.  Colostomy / Ileostomy.  ***  Wound:   ***  Current Diet:  NPO***      Tubes/Lines/Drains  ***  [x] Peripheral IV  [] Central Venous Line     	[] R	[] L	[] IJ	[] Fem	[] SC        Type:	    Date Placed:   [] Arterial Line		[] R	[] L	[] Fem	[] Rad	[] Ax	Date Placed:   [] PICC:         	[] Midline		[] Mediport           [] Urinary Catheter		Date Placed:     Extremities  Exam: Extremities warm, pink, well-perfused.        Derm:  Exam: Good skin turgor, no skin breakdown.      :   Exam: Miles catheter in place.     LABS  --------------------------------------------------------------------------------------  ((Insert SICU Labs here))***  --------------------------------------------------------------------------------------    OTHER LABS    IMAGING RESULTS      ASSESSMENT:  82y Male ***    PLAN:   Neurologic:   Respiratory:   Cardiovascular:   Gastrointestinal/Nutrition:   Renal/Genitourinary:   Hematologic:   Infectious Disease:   Lines/Tubes:  Endocrine:   Disposition:     --------------------------------------------------------------------------------------    Critical Care Diagnoses: SICU Consultation Note  =====================================================  HPI:  82M PHx HLD & hypothyroidism w/out PSHx presented w/ abdominal pain, found to have LBO at splenic flexure w/ evidence of pancreatic mass, peritoneal & lung nodules. He underwent a flexible sigmoidoscopy on 10/27 for decompression and colonic stent placement. In the PACU, he had abdominal pain and was found to have free intraperitoneal air on CXR.     Surgery Information  OR time:      EBL:       UOP:              IV Fluids:       Blood Products:             PAST MEDICAL & SURGICAL HISTORY:  Hypothyroid  Hyperlipemia  No significant past surgical history    Home Meds:   Allergies:   Soc:   Advanced Directives: Presumed Full Code     ROS:    General: Non-Contributory  Skin/Breast: Non-Contributory  Ophthalmologic: Non-Contributory  ENMT: Non-Contributory  Respiratory and Thorax: Non-Contributory  Cardiovascular: Non-Contributory  Gastrointestinal: Non-Contributory  Genitourinary: Non-Contributory  Musculoskeletal: Non-Contributory  Neurological: Non-Contributory  Psychiatric: Non-Contributory  Hematology/Lymphatics: Non-Contributory  Endocrine: Non-Contributory  Allergic/Immunologic: Non-Contributory    CURRENT MEDICATIONS:   --------------------------------------------------------------------------------------  Neurologic Medications  acetaminophen  IVPB. 1000 milliGRAM(s) IV Intermittent once PRN Mild Pain (1 - 3)  dexmedetomidine Infusion 1 MICROgram(s)/kG/Hr IV Continuous <Continuous>  HYDROmorphone  Injectable 0.5 milliGRAM(s) IV Push every 4 hours PRN Moderate Pain (4 - 6)  HYDROmorphone  Injectable 1 milliGRAM(s) IV Push every 6 hours PRN Severe Pain (7 - 10)    Respiratory Medications    Cardiovascular Medications    Gastrointestinal Medications  magnesium sulfate  IVPB 1 Gram(s) IV Intermittent once  sodium chloride 0.9%. 1000 milliLiter(s) IV Continuous <Continuous>    Genitourinary Medications    Hematologic/Oncologic Medications  enoxaparin Injectable 40 milliGRAM(s) SubCutaneous daily    Antimicrobial/Immunologic Medications  piperacillin/tazobactam IVPB. 3.375 Gram(s) IV Intermittent every 8 hours  vancomycin  IVPB 1000 milliGRAM(s) IV Intermittent every 12 hours  vancomycin  IVPB        Endocrine/Metabolic Medications  levothyroxine Injectable 37.5 MICROGram(s) IV Push daily  levothyroxine Injectable 37.5 MICROGram(s) IV Push daily  simvastatin 40 milliGRAM(s) Oral at bedtime    Topical/Other Medications    --------------------------------------------------------------------------------------    VITAL SIGNS, INS/OUTS (last 24 hours):  --------------------------------------------------------------------------------------  ((Insert SICU Vitals / Is+Os here)) ***  --------------------------------------------------------------------------------------    EXAM:  General/Neuro  RASS:   GCS:   Exam: Normal, NAD, alert, oriented x 3, no focal deficits. PERRLA  ***    Respiratory  Exam: Lungs clear to auscultation, Normal expansion/effort.  ***  [] Tracheostomy   [] Intubated  Mechanical Ventilation: Mode: AC/ CMV (Assist Control/ Continuous Mandatory Ventilation), RR (machine): 14, TV (machine): 450, FiO2: 40, PEEP: 5, ITime: 1, MAP: 7, PIP: 10    Cardiovascular  Exam: S1, S2.  Regular rate and rhythm.  Peripheral edema  ***  Cardiac Rhythm: Normal Sinus Rhythm  ECHO:     GI  Exam: Abdomen soft, Non-tender, Non-distended.  Gastrostomy / Jejunostomy tube in place.  Nasogastric tube in place.  Colostomy / Ileostomy.  ***  Wound:   ***  Current Diet:  NPO***      Tubes/Lines/Drains  ***  [x] Peripheral IV  [] Central Venous Line     	[] R	[] L	[] IJ	[] Fem	[] SC        Type:	    Date Placed:   [] Arterial Line		[] R	[] L	[] Fem	[] Rad	[] Ax	Date Placed:   [] PICC:         	[] Midline		[] Mediport           [] Urinary Catheter		Date Placed:     Extremities  Exam: Extremities warm, pink, well-perfused.        Derm:  Exam: Good skin turgor, no skin breakdown.      :   Exam: Miles catheter in place.     LABS  --------------------------------------------------------------------------------------  ((Insert SICU Labs here))***  --------------------------------------------------------------------------------------    OTHER LABS    IMAGING RESULTS      ASSESSMENT:  82y Male ***    PLAN:   Neurologic:   Respiratory:   Cardiovascular:   Gastrointestinal/Nutrition:   Renal/Genitourinary:   Hematologic:   Infectious Disease:   Lines/Tubes:  Endocrine:   Disposition:     --------------------------------------------------------------------------------------    Critical Care Diagnoses: SICU Consultation Note  =====================================================  HPI:  82M PHx HLD & hypothyroidism w/out PSHx presented w/ abdominal pain, found to have LBO at splenic flexure w/ evidence of pancreatic mass, peritoneal & lung nodules. He underwent a flexible sigmoidoscopy on 10/27 for decompression and colonic stent placement. In the PACU, he had abdominal pain and was found to have free intraperitoneal air on CXR. Patient was taken emergently to the OR and a large serosal tear was found in the transverse colon. He underwent resection of transverse colon and proximal left colon, and end colostomy. Heavy tumor burden seen throughout small bowel and omentum.    Surgery Information  EBL: 200      UOP: 600             IV Fluids: 1400            PAST MEDICAL & SURGICAL HISTORY:  Hypothyroid  Hyperlipemia  No significant past surgical history    Home Meds:   · 	Synthroid: 75 microgram(s) orally once a day          · 	simvastatin: 40 milligram(s) orally once a day    Allergies: NKA    Soc: Unable to assess    Advanced Directives: Presumed Full Code     ROS:    General: Unable to assess  Skin/Breast: Unable to assess  Ophthalmologic: Unable to assess  ENMT: Unable to assess  Respiratory and Thorax: Unable to assess  Cardiovascular: Unable to assess  Gastrointestinal: Unable to assess  Genitourinary: Unable to assess  Musculoskeletal: Unable to assess  Neurological: Unable to assess  Psychiatric: Unable to assess  Hematology/Lymphatics: Unable to assess  Endocrine: Unable to assess  Allergic/Immunologic: Unable to assess    CURRENT MEDICATIONS:   --------------------------------------------------------------------------------------  Neurologic Medications  acetaminophen  IVPB. 1000 milliGRAM(s) IV Intermittent once PRN Mild Pain (1 - 3)  dexmedetomidine Infusion 1 MICROgram(s)/kG/Hr IV Continuous <Continuous>  HYDROmorphone  Injectable 0.5 milliGRAM(s) IV Push every 4 hours PRN Moderate Pain (4 - 6)  HYDROmorphone  Injectable 1 milliGRAM(s) IV Push every 6 hours PRN Severe Pain (7 - 10)    Respiratory Medications    Cardiovascular Medications    Gastrointestinal Medications  magnesium sulfate  IVPB 1 Gram(s) IV Intermittent once  sodium chloride 0.9%. 1000 milliLiter(s) IV Continuous <Continuous>    Genitourinary Medications    Hematologic/Oncologic Medications  enoxaparin Injectable 40 milliGRAM(s) SubCutaneous daily    Antimicrobial/Immunologic Medications  piperacillin/tazobactam IVPB. 3.375 Gram(s) IV Intermittent every 8 hours  vancomycin  IVPB 1000 milliGRAM(s) IV Intermittent every 12 hours  vancomycin  IVPB        Endocrine/Metabolic Medications  levothyroxine Injectable 37.5 MICROGram(s) IV Push daily  levothyroxine Injectable 37.5 MICROGram(s) IV Push daily  simvastatin 40 milliGRAM(s) Oral at bedtime    Topical/Other Medications    --------------------------------------------------------------------------------------    VITAL SIGNS, INS/OUTS (last 24 hours):  --------------------------------------------------------------------------------------  Vital Signs Last 24 Hrs  T(C): 36.4 (28 Oct 2017 02:20), Max: 37.1 (27 Oct 2017 14:00)  T(F): 97.5 (28 Oct 2017 02:20), Max: 98.7 (27 Oct 2017 14:00)  HR: 116 (28 Oct 2017 02:30) (68 - 125)  BP: 136/78 (28 Oct 2017 02:20) (124/73 - 174/84)  BP(mean): 101 (28 Oct 2017 02:20) (97 - 119)  RR: 17 (28 Oct 2017 02:30) (14 - 26)  SpO2: 98% (28 Oct 2017 02:30) (96% - 99%)    I&O's Detail    26 Oct 2017 07:01  -  27 Oct 2017 07:00  --------------------------------------------------------  IN:    lactated ringers.: 1000 mL  Total IN: 1000 mL    OUT:    Voided: 600 mL  Total OUT: 600 mL    Total NET: 400 mL      27 Oct 2017 07:01  -  28 Oct 2017 03:14  --------------------------------------------------------  IN:    IV PiggyBack: 350 mL    lactated ringers.: 1000 mL    sodium chloride 0.9%: 100 mL    sodium chloride 0.9%.: 100 mL  Total IN: 1550 mL    OUT:  Total OUT: 0 mL    Total NET: 1550 mL    --------------------------------------------------------------------------------------    EXAM:  General/Neuro  Exam: Intubated and sedated    Respiratory  Exam: Lungs clear to auscultation, Normal expansion/effort.   [x] Intubated  Mechanical Ventilation: Mode: AC/ CMV (Assist Control/ Continuous Mandatory Ventilation), RR (machine): 14, TV (machine): 450, FiO2: 40, PEEP: 5, ITime: 1, MAP: 7, PIP: 10    Cardiovascular  Exam: S1, S2.  Regular rate and rhythm.    Cardiac Rhythm: Normal Sinus Rhythm    GI  Exam: Abdomen soft, Non-tender, Non-distended. Midline incision w/ dressing and minimal strikethrough. Ostomy pink and viable.  Current Diet:  NPO      Tubes/Lines/Drains    [x] Peripheral IV  [] Central Venous Line     	[] R	[] L	[] IJ	[] Fem	[] SC        Type:	    Date Placed:   [x] Arterial Line		[] R	[] L	[] Fem	[] Rad	[] Ax	Date Placed:   [] PICC:         	[] Midline		[] Mediport           [] Urinary Catheter		Date Placed:     Extremities  Exam: Extremities warm, pink, well-perfused.        Derm:  Exam: Good skin turgor, no skin breakdown.      :   Exam: Miles catheter in place.     LABS  --------------------------------------------------------------------------------------  CBC Full  -  ( 27 Oct 2017 08:33 )  WBC Count : 10.28 K/uL  Hemoglobin : 14.1 g/dL  Hematocrit : 42.0 %  Platelet Count - Automated : 222 K/uL  Mean Cell Volume : 91.5 fl  Mean Cell Hemoglobin : 30.7 pg  Mean Cell Hemoglobin Concentration : 33.6 gm/dL    10-27    140  |  101  |  10  ----------------------------<  120<H>  4.4   |  25  |  0.74    Ca    8.9      27 Oct 2017 08:26  Phos  2.9     10  Mg     1.9     10-27    TPro  6.9  /  Alb  4.4  /  TBili  0.4  /  DBili  x   /  AST  19  /  ALT  14  /  AlkPhos  99  10-    LIVER FUNCTIONS - ( 26 Oct 2017 05:03 )  Alb: 4.4 g/dL / Pro: 6.9 g/dL / ALK PHOS: 99 U/L / ALT: 14 U/L RC / AST: 19 U/L / GGT: x           PT/INR - ( 26 Oct 2017 15:29 )   PT: 11.3 sec;   INR: 1.04 ratio         PTT - ( 26 Oct 2017 15:29 )  PTT:28.3 sec  Urinalysis Basic - ( 26 Oct 2017 11:25 )    Color: Yellow / Appearance: Clear / S.026 / pH: x  Gluc: x / Ketone: Negative  / Bili: Negative / Urobili: Negative   Blood: x / Protein: Trace / Nitrite: Negative   Leuk Esterase: Negative / RBC: 0-2 /HPF / WBC 0-2 /HPF   Sq Epi: x / Non Sq Epi: OCC /HPF / Bacteria: Few /HPF    --------------------------------------------------------------------------------------  IMAGING RESULTS  CT Abdomen and Pelvis w/ Oral Cont and w/ IV Cont (10.26.17 @ 07:33)   LOWER CHEST: Pulmonary nodules along the right major and minor fissure.   The largest measures 0.3 cm.    LIVER: Within normal limits.  BILE DUCTS: Normal caliber.   GALLBLADDER: Within normal limits.  SPLEEN: Within normal limits.  PANCREAS: Asymmetric masslike enlargement of the pancreatic tail.  ADRENALS: Within normal limits.  KIDNEYS/URETERS: Mild fullness of the left renal collecting system.     BLADDER: Enhancing nodule along the left lateral wall of the urinary   bladder, measuring 0.9 x 0.9 cm. Distended urinary bladder.  REPRODUCTIVE ORGANS: The prostate is enlarged.    BOWEL: Large bowel obstruction with transition point at the splenic   flexure where there is mural thickening and extracolonic soft tissue   nodularity, possibly neoplasm. Additional area of luminal narrowing along   the junction of the descending and sigmoid colon. Colonic diverticulosis   with mild inflammatory changes in the sigmoid colon.  PERITONEUM: Peritoneal implant in the right lower quadrant (series 2,   image 84), measuring 1.7 x 1.0 cm, probably peritoneal carcinomatosis.   Small perisplenic ascites and a small amount of fluid along the right   paracolic gutter.  VESSELS:  Atherosclerotic change of the abdominal aorta and its branches.  RETROPERITONEUM: No lymphadenopathy.    ABDOMINAL WALL: Small left inguinal hernia  BONES: Multiple lucent lesions in the pelvis, nonspecific. Degenerative   changes of the spine.    IMPRESSION:   1.  Large bowel obstruction with transition point at the splenic flexure   where there is mural thickening and extracolonic soft tissue nodularity,   suspicious for neoplasm. Correlation with colonoscopy is recommended.   Possible additional areas of narrowing along the sigmoid colon.  2.  Asymmetric masslike enlargement ofthe pancreatic tail, possibly   neoplasm.  3.  Probable peritoneal carcinomatosis and small volume abdominal ascites.  4.  Colonic diverticulosis with mild inflammatory changes in the sigmoid   colon, probably related to adjacent neoplasm.  5.  Mild fullness of the left renal collecting system, possibly due to   distended urinary bladder.  6.  Enhancing nodule along the left lateral aspect of the urinary   bladder, measuring 0.9 cm, possibly inflammatory or neoplasm.  7.  Pulmonary nodules along the right major and minor fissure, possibly   intrapulmonary lymph nodes or metastases. Consider further evaluation   with dedicated CT chest.

## 2017-10-28 NOTE — PROGRESS NOTE ADULT - SUBJECTIVE AND OBJECTIVE BOX
Surgery Progress Note    S: Patient seen and examined. Brought to OR yesterday evening emergently secondary to perforated colon during colonoscopy. Exploratory laparotomy was performed. A large segment of transverse colon was excised, end colostomy was created. Patient transferred post-operatively to SICU in critical condition, intubated, sedated, and requiring pressors.    O:  Vital Signs Last 24 Hrs  T(C): 37 (28 Oct 2017 19:00), Max: 37 (28 Oct 2017 19:00)  T(F): 98.6 (28 Oct 2017 19:00), Max: 98.6 (28 Oct 2017 19:00)  HR: 93 (28 Oct 2017 20:30) (62 - 125)  BP: 120/70 (28 Oct 2017 20:00) (75/51 - 174/84)  BP(mean): 89 (28 Oct 2017 20:00) (57 - 119)  RR: 14 (28 Oct 2017 20:30) (13 - 26)  SpO2: 99% (28 Oct 2017 20:30) (97% - 100%)    I&O's Detail    27 Oct 2017 07:01  -  28 Oct 2017 07:00  --------------------------------------------------------  IN:    dexmedetomidine Infusion: 25 mL    lactated ringers.: 1000 mL    sodium chloride 0.9%: 100 mL    Sodium Chloride 0.9% IV Bolus: 1000 mL    sodium chloride 0.9%.: 650 mL    Solution: 375 mL    Solution: 250 mL  Total IN: 3400 mL    OUT:    Indwelling Catheter - Urethral: 175 mL  Total OUT: 175 mL    Total NET: 3225 mL      28 Oct 2017 07:01  -  28 Oct 2017 20:59  --------------------------------------------------------  IN:    dexmedetomidine Infusion: 23.6 mL    norepinephrine Infusion: 88.6 mL    phenylephrine   Infusion: 176.8 mL    sodium chloride 0.9%.: 1625 mL    Solution: 2000 mL    Solution: 400 mL    Solution: 250 mL    Solution: 175 mL  Total IN: 4739 mL    OUT:    Colostomy: 20 mL    Indwelling Catheter - Urethral: 490 mL    Nasoenteral Tube: 150 mL  Total OUT: 660 mL    Total NET: 4079 mL          MEDICATIONS  (STANDING):  dexmedetomidine Infusion 1 MICROgram(s)/kG/Hr (14.75 mL/Hr) IV Continuous <Continuous>  enoxaparin Injectable 40 milliGRAM(s) SubCutaneous daily  insulin lispro (HumaLOG) corrective regimen sliding scale   SubCutaneous every 6 hours  levothyroxine Injectable 37.5 MICROGram(s) IV Push daily  norepinephrine Infusion 0.012 MICROgram(s)/kG/Min (1.327 mL/Hr) IV Continuous <Continuous>  pantoprazole  Injectable 40 milliGRAM(s) IV Push daily  piperacillin/tazobactam IVPB. 3.375 Gram(s) IV Intermittent every 8 hours  simvastatin 40 milliGRAM(s) Oral at bedtime  sodium chloride 0.9%. 1000 milliLiter(s) (125 mL/Hr) IV Continuous <Continuous>  vancomycin  IVPB 1000 milliGRAM(s) IV Intermittent every 12 hours  vancomycin  IVPB        MEDICATIONS  (PRN):  HYDROmorphone  Injectable 0.5 milliGRAM(s) IV Push every 4 hours PRN Moderate Pain (4 - 6)  HYDROmorphone  Injectable 1 milliGRAM(s) IV Push every 6 hours PRN Severe Pain (7 - 10)                            12.5   8.9   )-----------( 249      ( 28 Oct 2017 17:15 )             38.2       10-28    136  |  108  |  14  ----------------------------<  144<H>  4.4   |  17<L>  |  0.72    Ca    7.0<L>      28 Oct 2017 17:15  Phos  3.6     10-28  Mg     3.0     10-28        Physical Exam:  Gen: Laying in bed, NAD, alert and oriented.   HEENT: ET tube in place  Resp: Unlabored breathing on vent  Abd: soft, NTND. Colostomy pink/viable with no contents in bag.   Extremities: WWP, no GRUPO

## 2017-10-28 NOTE — BRIEF OPERATIVE NOTE - PROCEDURE
<<-----Click on this checkbox to enter Procedure Extended left hemicolectomy  10/28/2017  with end colostomy, Abdominal washout  Active  [facility]

## 2017-10-28 NOTE — PROCEDURE NOTE - NSINDICATIONS_GEN_A_CORE
emergency venous access/hemodynamic monitoring/venous access/critical illness/volume resuscitation
arterial puncture to obtain ABG's/monitoring purposes/critical patient

## 2017-10-29 LAB
ANION GAP SERPL CALC-SCNC: 10 MMOL/L — SIGNIFICANT CHANGE UP (ref 5–17)
APTT BLD: 37.5 SEC — HIGH (ref 27.5–37.4)
BUN SERPL-MCNC: 14 MG/DL — SIGNIFICANT CHANGE UP (ref 7–23)
CALCIUM SERPL-MCNC: 7.1 MG/DL — LOW (ref 8.4–10.5)
CHLORIDE SERPL-SCNC: 107 MMOL/L — SIGNIFICANT CHANGE UP (ref 96–108)
CO2 SERPL-SCNC: 21 MMOL/L — LOW (ref 22–31)
CREAT SERPL-MCNC: 0.71 MG/DL — SIGNIFICANT CHANGE UP (ref 0.5–1.3)
GAS PNL BLDA: SIGNIFICANT CHANGE UP
GAS PNL BLDA: SIGNIFICANT CHANGE UP
GLUCOSE SERPL-MCNC: 124 MG/DL — HIGH (ref 70–99)
HBA1C BLD-MCNC: 6.1 % — HIGH (ref 4–5.6)
HCT VFR BLD CALC: 32.3 % — LOW (ref 39–50)
HGB BLD-MCNC: 11.3 G/DL — LOW (ref 13–17)
INR BLD: 1 RATIO — SIGNIFICANT CHANGE UP (ref 0.88–1.16)
MAGNESIUM SERPL-MCNC: 2.6 MG/DL — SIGNIFICANT CHANGE UP (ref 1.6–2.6)
MCHC RBC-ENTMCNC: 32.9 PG — SIGNIFICANT CHANGE UP (ref 27–34)
MCHC RBC-ENTMCNC: 35 GM/DL — SIGNIFICANT CHANGE UP (ref 32–36)
MCV RBC AUTO: 93.9 FL — SIGNIFICANT CHANGE UP (ref 80–100)
PHOSPHATE SERPL-MCNC: 3.1 MG/DL — SIGNIFICANT CHANGE UP (ref 2.5–4.5)
PLATELET # BLD AUTO: 212 K/UL — SIGNIFICANT CHANGE UP (ref 150–400)
POTASSIUM SERPL-MCNC: 4.1 MMOL/L — SIGNIFICANT CHANGE UP (ref 3.5–5.3)
POTASSIUM SERPL-SCNC: 4.1 MMOL/L — SIGNIFICANT CHANGE UP (ref 3.5–5.3)
PROCALCITONIN SERPL-MCNC: 47.28 NG/ML — HIGH (ref 0–0.04)
PROTHROM AB SERPL-ACNC: 14.9 SEC — HIGH (ref 9.8–12.7)
RBC # BLD: 3.44 M/UL — LOW (ref 4.2–5.8)
RBC # FLD: 12.2 % — SIGNIFICANT CHANGE UP (ref 10.3–14.5)
SODIUM SERPL-SCNC: 138 MMOL/L — SIGNIFICANT CHANGE UP (ref 135–145)
WBC # BLD: 13.4 K/UL — HIGH (ref 3.8–10.5)
WBC # FLD AUTO: 13.4 K/UL — HIGH (ref 3.8–10.5)

## 2017-10-29 PROCEDURE — 71010: CPT | Mod: 26

## 2017-10-29 PROCEDURE — 99291 CRITICAL CARE FIRST HOUR: CPT

## 2017-10-29 PROCEDURE — 99232 SBSQ HOSP IP/OBS MODERATE 35: CPT | Mod: GC

## 2017-10-29 RX ORDER — CALCIUM GLUCONATE 100 MG/ML
2 VIAL (ML) INTRAVENOUS ONCE
Qty: 0 | Refills: 0 | Status: COMPLETED | OUTPATIENT
Start: 2017-10-29 | End: 2017-10-29

## 2017-10-29 RX ORDER — SIMVASTATIN 20 MG/1
40 TABLET, FILM COATED ORAL AT BEDTIME
Qty: 0 | Refills: 0 | Status: DISCONTINUED | OUTPATIENT
Start: 2017-10-29 | End: 2017-10-30

## 2017-10-29 RX ORDER — HALOPERIDOL DECANOATE 100 MG/ML
5 INJECTION INTRAMUSCULAR ONCE
Qty: 0 | Refills: 0 | Status: COMPLETED | OUTPATIENT
Start: 2017-10-29 | End: 2017-10-29

## 2017-10-29 RX ADMIN — HYDROMORPHONE HYDROCHLORIDE 0.5 MILLIGRAM(S): 2 INJECTION INTRAMUSCULAR; INTRAVENOUS; SUBCUTANEOUS at 01:46

## 2017-10-29 RX ADMIN — HYDROMORPHONE HYDROCHLORIDE 0.5 MILLIGRAM(S): 2 INJECTION INTRAMUSCULAR; INTRAVENOUS; SUBCUTANEOUS at 01:26

## 2017-10-29 RX ADMIN — HYDROMORPHONE HYDROCHLORIDE 0.5 MILLIGRAM(S): 2 INJECTION INTRAMUSCULAR; INTRAVENOUS; SUBCUTANEOUS at 22:18

## 2017-10-29 RX ADMIN — Medication 37.5 MICROGRAM(S): at 05:00

## 2017-10-29 RX ADMIN — PANTOPRAZOLE SODIUM 40 MILLIGRAM(S): 20 TABLET, DELAYED RELEASE ORAL at 13:00

## 2017-10-29 RX ADMIN — SIMVASTATIN 40 MILLIGRAM(S): 20 TABLET, FILM COATED ORAL at 22:07

## 2017-10-29 RX ADMIN — ENOXAPARIN SODIUM 40 MILLIGRAM(S): 100 INJECTION SUBCUTANEOUS at 13:00

## 2017-10-29 RX ADMIN — SODIUM CHLORIDE 75 MILLILITER(S): 9 INJECTION INTRAMUSCULAR; INTRAVENOUS; SUBCUTANEOUS at 09:40

## 2017-10-29 RX ADMIN — DEXMEDETOMIDINE HYDROCHLORIDE IN 0.9% SODIUM CHLORIDE 14.75 MICROGRAM(S)/KG/HR: 4 INJECTION INTRAVENOUS at 07:30

## 2017-10-29 RX ADMIN — HYDROMORPHONE HYDROCHLORIDE 0.5 MILLIGRAM(S): 2 INJECTION INTRAMUSCULAR; INTRAVENOUS; SUBCUTANEOUS at 21:55

## 2017-10-29 RX ADMIN — Medication 1.33 MICROGRAM(S)/KG/MIN: at 07:30

## 2017-10-29 RX ADMIN — HYDROMORPHONE HYDROCHLORIDE 0.5 MILLIGRAM(S): 2 INJECTION INTRAMUSCULAR; INTRAVENOUS; SUBCUTANEOUS at 13:45

## 2017-10-29 RX ADMIN — PIPERACILLIN AND TAZOBACTAM 25 GRAM(S): 4; .5 INJECTION, POWDER, LYOPHILIZED, FOR SOLUTION INTRAVENOUS at 22:07

## 2017-10-29 RX ADMIN — HYDROMORPHONE HYDROCHLORIDE 0.5 MILLIGRAM(S): 2 INJECTION INTRAMUSCULAR; INTRAVENOUS; SUBCUTANEOUS at 13:29

## 2017-10-29 RX ADMIN — Medication 250 MILLIGRAM(S): at 04:59

## 2017-10-29 RX ADMIN — HALOPERIDOL DECANOATE 5 MILLIGRAM(S): 100 INJECTION INTRAMUSCULAR at 07:50

## 2017-10-29 RX ADMIN — PIPERACILLIN AND TAZOBACTAM 25 GRAM(S): 4; .5 INJECTION, POWDER, LYOPHILIZED, FOR SOLUTION INTRAVENOUS at 13:00

## 2017-10-29 RX ADMIN — PIPERACILLIN AND TAZOBACTAM 25 GRAM(S): 4; .5 INJECTION, POWDER, LYOPHILIZED, FOR SOLUTION INTRAVENOUS at 04:59

## 2017-10-29 RX ADMIN — Medication 400 GRAM(S): at 04:22

## 2017-10-29 NOTE — PROGRESS NOTE ADULT - SUBJECTIVE AND OBJECTIVE BOX
Surgery Progress Note    S: Patient seen and examined. No acute events overnight. Pain well controlled with current regimen. Patient received 2L of IV fluid overnight for persistent hypotension, later started on levo.     O:  Vital Signs Last 24 Hrs  T(C): 36.6 (29 Oct 2017 03:00), Max: 37 (28 Oct 2017 19:00)  T(F): 97.8 (29 Oct 2017 03:00), Max: 98.6 (28 Oct 2017 19:00)  HR: 60 (29 Oct 2017 07:00) (59 - 94)  BP: 120/70 (28 Oct 2017 20:00) (75/51 - 132/70)  BP(mean): 89 (28 Oct 2017 20:00) (57 - 95)  RR: 14 (29 Oct 2017 07:00) (11 - 21)  SpO2: 100% (29 Oct 2017 07:00) (99% - 100%)    I&O's Detail    28 Oct 2017 07:01  -  29 Oct 2017 07:00  --------------------------------------------------------  IN:    dexmedetomidine Infusion: 74.4 mL    norepinephrine Infusion: 260.1 mL    phenylephrine   Infusion: 176.8 mL    sodium chloride 0.9%.: 3000 mL    Solution: 375 mL    Solution: 400 mL    Solution: 2100 mL    Solution: 500 mL  Total IN: 6886.3 mL    OUT:    Colostomy: 30 mL    Indwelling Catheter - Urethral: 1285 mL    Nasoenteral Tube: 150 mL  Total OUT: 1465 mL    Total NET: 5421.3 mL          MEDICATIONS  (STANDING):  dexmedetomidine Infusion 1 MICROgram(s)/kG/Hr (14.75 mL/Hr) IV Continuous <Continuous>  enoxaparin Injectable 40 milliGRAM(s) SubCutaneous daily  insulin lispro (HumaLOG) corrective regimen sliding scale   SubCutaneous every 6 hours  levothyroxine Injectable 37.5 MICROGram(s) IV Push daily  norepinephrine Infusion 0.012 MICROgram(s)/kG/Min (1.327 mL/Hr) IV Continuous <Continuous>  pantoprazole  Injectable 40 milliGRAM(s) IV Push daily  piperacillin/tazobactam IVPB. 3.375 Gram(s) IV Intermittent every 8 hours  simvastatin 40 milliGRAM(s) Oral at bedtime  sodium chloride 0.9%. 1000 milliLiter(s) (125 mL/Hr) IV Continuous <Continuous>  vancomycin  IVPB 1000 milliGRAM(s) IV Intermittent every 12 hours  vancomycin  IVPB        MEDICATIONS  (PRN):  HYDROmorphone  Injectable 0.5 milliGRAM(s) IV Push every 4 hours PRN Moderate Pain (4 - 6)  HYDROmorphone  Injectable 1 milliGRAM(s) IV Push every 6 hours PRN Severe Pain (7 - 10)                            11.3   13.4  )-----------( 212      ( 29 Oct 2017 03:09 )             32.3       10-29    138  |  107  |  14  ----------------------------<  124<H>  4.1   |  21<L>  |  0.71    Ca    7.1<L>      29 Oct 2017 03:09  Phos  3.1     10-29  Mg     2.6     10-29        Physical Exam:  Gen: Laying in bed, NAD, intubated/sedated, responsive to external stimuli  HEENT: ET tube in place   Resp: Unlabored breathing on vent  Abd: soft, NTND. Colostomy pink/viable with no contents in bag.

## 2017-10-29 NOTE — PROGRESS NOTE ADULT - SUBJECTIVE AND OBJECTIVE BOX
GASTROENTEROLOGY FOLLOW-UP NOTE    Chief Complaint:  Patient is a 82y old  Male who presents with a chief complaint of LBO, no BM x2wk (26 Oct 2017 16:16)      Interval Events:   [] Remains intubated in SICU.  [] Intermittently requiring pressors.     Allergies:  No Known Allergies      Hospital Medications:  dexmedetomidine Infusion 1 MICROgram(s)/kG/Hr IV Continuous <Continuous>  enoxaparin Injectable 40 milliGRAM(s) SubCutaneous daily  HYDROmorphone  Injectable 0.5 milliGRAM(s) IV Push every 4 hours PRN  HYDROmorphone  Injectable 1 milliGRAM(s) IV Push every 6 hours PRN  levothyroxine Injectable 37.5 MICROGram(s) IV Push daily  magnesium sulfate  IVPB 1 Gram(s) IV Intermittent once  piperacillin/tazobactam IVPB. 3.375 Gram(s) IV Intermittent every 8 hours  simvastatin 40 milliGRAM(s) Oral at bedtime  sodium chloride 0.9%. 1000 milliLiter(s) IV Continuous <Continuous>  vancomycin  IVPB 1000 milliGRAM(s) IV Intermittent every 12 hours  vancomycin  IVPB          PMHX/PSHX:  Hypothyroid  Hyperlipemia  No significant past surgical history      Family history:  No pertinent family history in first degree relatives      ROS:     General:  No wt loss, fevers, chills, night sweats, fatigue,   Eyes:  Good vision, no reported pain  ENT:  No sore throat, pain, runny nose, dysphagia  CV:  No pain, palpitations, hypo/hypertension  Resp:  No dyspnea, cough, tachypnea, wheezing  GI:  see HPI  :  No pain, bleeding, incontinence, nocturia  Muscle:  No pain, weakness  Neuro:  No weakness, tingling, memory problems  Psych:  No fatigue, insomnia, mood problems, depression  Endocrine:  No polyuria, polydipsia, cold/heat intolerance  Heme:  No petechiae, ecchymosis, easy bruisability  Skin:  No rash, tattoos, scars, edema      PHYSICAL EXAM:   Vital Signs:  Vital Signs Last 24 Hrs  T(C): 36.4 (28 Oct 2017 03:00), Max: 37.1 (27 Oct 2017 14:00)  T(F): 97.5 (28 Oct 2017 03:00), Max: 98.7 (27 Oct 2017 14:00)  HR: 82 (28 Oct 2017 08:08) (68 - 125)  BP: 95/62 (28 Oct 2017 06:00) (88/63 - 174/84)  BP(mean): 74 (28 Oct 2017 06:00) (69 - 119)  RR: 15 (28 Oct 2017 07:00) (14 - 26)  SpO2: 100% (28 Oct 2017 08:08) (96% - 100%)  Daily     Daily Weight in k.6 (28 Oct 2017 05:00)    GENERAL:  no acute distress  HEENT:  -icterus, ETT in place   CHEST:  clear bilaterally, no wheezes or rales  HEART:  RRR, S1S2  ABDOMEN:  soft, surgical dressing/colostomy in place   EXTEREMITIES:  no  edema  SKIN:  No rash/erythema/ecchymoses/petechiae/wounds/abscess/warm/dry  NEURO:  alert to voice    LABS:                                  11.3   13.4  )-----------( 212      ( 29 Oct 2017 03:09 )             32.3       10    138  |  107  |  14  ----------------------------<  124<H>  4.1   |  21<L>  |  0.71    Ca    7.1<L>      29 Oct 2017 03:09  Phos  3.1     10-29  Mg     2.6     10-29            PT/INR - ( 29 Oct 2017 03:09 )   PT: 14.9 sec;   INR: 1.00 ratio         PTT - ( 29 Oct 2017 03:09 )  PTT:37.5 sec

## 2017-10-29 NOTE — PROGRESS NOTE ADULT - ASSESSMENT
Impression:  1) Large bowel obstruction (transition point at hepatic flexure) secondary to pancreatic tail mass s/p flex sig with stent placement complicated by pneumoperitoneum now s/p extended left hemicolectomy, washout, end colostomy    Plan:   - continue antibiotics, pain control  - post-surgical care   - remainder of plan per surgery/SICU

## 2017-10-29 NOTE — PROGRESS NOTE ADULT - ASSESSMENT
ASSESSMENT:  82y Male with peritoneal carcinomatosis, colonic perforation following sigmoidoscopy now s/p resection of transverse colon and proximal left colon with end colostomy.    PLAN:   - Wean pressors as tolerated  - C/w sedation as needed for ventilator  - Postoperative pain control PRN  - C/w mechanical ventilation  - Wean to extubate  - NPO  - C/w Miles  - Lovenox for DVT ppx  - Vancomycin and Zosyn for fecal peritonitis  - Management per SICU team    Patient seen and examined with Dr. Ambrose Villarreal, PGY-1  [place] [facility] [phone] ASSESSMENT:  82y Male with peritoneal carcinomatosis, colonic perforation following sigmoidoscopy now s/p resection of transverse colon and proximal left colon with end colostomy.    PLAN:   - Wean pressors as tolerated  - C/w sedation as needed for ventilator  - Postoperative pain control PRN  - C/w mechanical ventilation  - Wean to extubate  - NPO  - C/w Miles  - Lovenox for DVT ppx  - Vancomycin and Zosyn for fecal peritonitis  - Management per SICU team    Patient seen and examined with Dr. Boggs, case discussed with SICU resident (Kirti)     Jus Villarreal, PGY-1  [facility] [phone]

## 2017-10-29 NOTE — AIRWAY REMOVAL NOTE  ADULT & PEDS - ARTIFICAL AIRWAY REMOVAL COMMENTS
Written order for extubation verified. The patient was identified by full name and birth date compared to the identification band. Present during the procedure was DIMITRIS Parra

## 2017-10-29 NOTE — PROGRESS NOTE ADULT - ASSESSMENT
· Assessment		  ASSESSMENT:  82y Male with peritoneal carcinomatosis, colonic perforation following sigmoidoscopy now s/p resection of transverse colon and proximal left colon with end colostomy.    PLAN:   Neurologic:   - C/w sedation as needed for ventilator  - Postoperative pain control PRN    Respiratory:   - C/w mechanical ventilation  - Wean to extubate    Cardiovascular:   - hypotension, continue levo    Gastrointestinal/Nutrition:   - NPO except meds    Renal/Genitourinary:   - C/w Miles    Hematologic:   - Lovenox for DVT ppx    Infectious Disease:   - Vancomycin and Zosyn for fecal peritonitis    Lines/Tubes:  - PIV, Miles    Endocrine:   - C/w Synthroid    Disposition: SICU

## 2017-10-29 NOTE — PROGRESS NOTE ADULT - SUBJECTIVE AND OBJECTIVE BOX
HISTORY  82M PHx HLD & hypothyroidism w/out PSHx presented w/ abdominal pain, found to have LBO at splenic flexure w/ evidence of pancreatic mass, peritoneal & lung nodules. He underwent a flexible sigmoidoscopy on 10/27 for decompression and colonic stent placement. In the PACU, he had abdominal pain and was found to have free intraperitoneal air on CXR. Patient was taken emergently to the OR and a large serosal tear was found in the transverse colon. He underwent resection of transverse colon and proximal left colon, and end colostomy. Heavy tumor burden seen throughout small bowel and omentum.    24 HOUR EVENTS: hypotensive s/p 2L fluids- euvolemic, started on levo, empiric antibiotics. Lactate trending down.    SUBJECTIVE/ROS:  [ ] A ten-point review of systems was otherwise negative except as noted.  [ ] Due to altered mental status/intubation, subjective information were not able to be obtained from the patient. History was obtained, to the extent possible, from review of the chart and collateral sources of information.      NEURO  RASS:     GCS:     [facility] ICU:  Exam: intubated & sedated  Meds: dexmedetomidine Infusion 1 MICROgram(s)/kG/Hr IV Continuous <Continuous>  HYDROmorphone  Injectable 0.5 milliGRAM(s) IV Push every 4 hours PRN Moderate Pain (4 - 6)  HYDROmorphone  Injectable 1 milliGRAM(s) IV Push every 6 hours PRN Severe Pain (7 - 10)    [x] Adequacy of sedation and pain control has been assessed and adjusted      RESPIRATORY  RR: 13 (10-29-17 @ 03:15) (11 - 21)  SpO2: 100% (10-29-17 @ 03:15) (97% - 100%)  Wt(kg): --  Exam: unlabored, clear to auscultation bilaterally  Mechanical Ventilation: Mode: AC/ CMV (Assist Control/ Continuous Mandatory Ventilation), RR (machine): 14, RR (patient): 16, TV (machine): 450, FiO2: 40, PEEP: 5, ITime: 1, MAP: 8, PIP: 12  ABG - ( 29 Oct 2017 03:10 )  pH: 7.40  /  pCO2: 32    /  pO2: 169   / HCO3: 20    / Base Excess: -4.0  /  SaO2: 100     Lactate: x          [ ] Extubation Readiness Assessed  Meds:       CARDIOVASCULAR  HR: 63 (10-29-17 @ 03:15) (62 - 95)  BP: 120/70 (10-28-17 @ 20:00) (75/51 - 132/70)  BP(mean): 89 (10-28-17 @ 20:00) (57 - 95)  ABP: 105/52 (10-29-17 @ 03:15) (67/53 - 134/67)  ABP(mean): 72 (10-29-17 @ 03:15) (59 - 94)  Wt(kg): --  CVP(cm H2O): --      Exam:  Cardiac Rhythm: NSR  Perfusion     [ ]Adequate   [ ]Inadequate  Mentation   [ ]Normal       [ ]Reduced  Extremities  [ ]Warm         [ ]Cool  Volume Status [ ]Hypervolemic [ ]Euvolemic [ ]Hypovolemic  Meds: norepinephrine Infusion 0.012 MICROgram(s)/kG/Min IV Continuous <Continuous>        GI/NUTRITION  Exam: Abdomen soft, Non-tender, Non-distended. Midline incision w/ dressing and minimal strikethrough. Ostomy pink and viable.  Current Diet:  NPO except medications  Meds: pantoprazole Injectable 40 milliGRAM(s) IV Push daily      GENITOURINARY  I&O's Detail    10-27 @ 07:01  -  10-28 @ 07:00  --------------------------------------------------------  IN:    dexmedetomidine Infusion: 25 mL    lactated ringers.: 1000 mL    sodium chloride 0.9%: 100 mL    Sodium Chloride 0.9% IV Bolus: 1000 mL    sodium chloride 0.9%.: 650 mL    Solution: 375 mL    Solution: 250 mL  Total IN: 3400 mL    OUT:    Indwelling Catheter - Urethral: 175 mL  Total OUT: 175 mL    Total NET: 3225 mL      10-28 @ 07:01  -  10-29 @ 03:37  --------------------------------------------------------  IN:    dexmedetomidine Infusion: 49.2 mL    norepinephrine Infusion: 168.4 mL    phenylephrine   Infusion: 176.8 mL    sodium chloride 0.9%.: 2250 mL    Solution: 2000 mL    Solution: 400 mL    Solution: 250 mL    Solution: 250 mL  Total IN: 5544.4 mL    OUT:    Colostomy: 20 mL    Indwelling Catheter - Urethral: 795 mL    Nasoenteral Tube: 150 mL  Total OUT: 965 mL    Total NET: 4579.4 mL      10-28    136  |  108  |  14  ----------------------------<  144<H>  4.4   |  17<L>  |  0.72    Ca    7.0<L>      28 Oct 2017 17:15  Phos  3.6     10-28  Mg     3.0     10-28      [ ] Miles catheter, indication: N/A  Meds: calcium gluconate IVPB 2 Gram(s) IV Intermittent once  sodium chloride 0.9%. 1000 milliLiter(s) IV Continuous <Continuous>    HEMATOLOGIC  Meds: enoxaparin Injectable 40 milliGRAM(s) SubCutaneous daily    [x] VTE Prophylaxis                        11.3   13.4  )-----------( 212      ( 29 Oct 2017 03:09 )             32.3       Transfusion     [ ] PRBC   [ ] Platelets   [ ] FFP   [ ] Cryoprecipitate    INFECTIOUS DISEASES  T(C): 36.6 (10-29-17 @ 03:00), Max: 37 (10-28-17 @ 19:00)  Wt(kg): --  WBC Count: 13.4 K/uL (10-29 @ 03:09)  WBC Count: 8.9 K/uL (10-28 @ 17:15)  WBC Count: 3.7 K/uL (10-28 @ 10:26)    Recent Cultures:    Meds: piperacillin/tazobactam IVPB. 3.375 Gram(s) IV Intermittent every 8 hours  vancomycin  IVPB 1000 milliGRAM(s) IV Intermittent every 12 hours  vancomycin  IVPB          ENDOCRINE  Capillary Blood Glucose    Meds: insulin lispro (HumaLOG) corrective regimen sliding scale   SubCutaneous every 6 hours  levothyroxine Injectable 37.5 MICROGram(s) IV Push daily  simvastatin 40 milliGRAM(s) Oral at bedtime      ACCESS DEVICES:  [ ] Peripheral IV  [ ] Central Venous Line	[ ] R	[ ] L	[ ] IJ	[ ] Fem	[ ] SC	Placed:   [ ] Arterial Line		[ ] R	[ ] L	[ ] Fem	[ ] Rad	[ ] Ax	Placed:   [ ] PICC:					[ ] Mediport  [ ] Urinary Catheter, Date Placed:   [ ] Necessity of urinary, arterial, and venous catheters discussed    OTHER MEDICATIONS:      CODE STATUS:     IMAGING: HISTORY  82M PHx HLD & hypothyroidism w/out PSHx presented w/ abdominal pain, found to have LBO at splenic flexure w/ evidence of pancreatic mass, peritoneal & lung nodules. He underwent a flexible sigmoidoscopy on 10/27 for decompression and colonic stent placement. In the PACU, he had abdominal pain and was found to have free intraperitoneal air on CXR. Patient was taken emergently to the OR and a large serosal tear was found in the transverse colon. He underwent resection of transverse colon and proximal left colon, and end colostomy. Heavy tumor burden seen throughout small bowel and omentum.    24 HOUR EVENTS: hypotensive s/p 2L fluids- euvolemic, started on levo, empiric antibiotics. Lactate trending down.    NEURO  Exam: intubated & sedated  Meds: dexmedetomidine Infusion 1 MICROgram(s)/kG/Hr IV Continuous <Continuous>  HYDROmorphone  Injectable 0.5 milliGRAM(s) IV Push every 4 hours PRN Moderate Pain (4 - 6)  HYDROmorphone  Injectable 1 milliGRAM(s) IV Push every 6 hours PRN Severe Pain (7 - 10)    [x] Adequacy of sedation and pain control has been assessed and adjusted      RESPIRATORY  RR: 13 (10-29-17 @ 03:15) (11 - 21)  SpO2: 100% (10-29-17 @ 03:15) (97% - 100%)  Wt(kg): --  Exam: unlabored, clear to auscultation bilaterally  Mechanical Ventilation: Mode: AC/ CMV (Assist Control/ Continuous Mandatory Ventilation), RR (machine): 14, RR (patient): 16, TV (machine): 450, FiO2: 40, PEEP: 5, ITime: 1, MAP: 8, PIP: 12  ABG - ( 29 Oct 2017 03:10 )  pH: 7.40  /  pCO2: 32    /  pO2: 169   / HCO3: 20    / Base Excess: -4.0  /  SaO2: 100     Lactate: x          [ ] Extubation Readiness Assessed  Meds:       CARDIOVASCULAR  HR: 63 (10-29-17 @ 03:15) (62 - 95)  BP: 120/70 (10-28-17 @ 20:00) (75/51 - 132/70)  BP(mean): 89 (10-28-17 @ 20:00) (57 - 95)  ABP: 105/52 (10-29-17 @ 03:15) (67/53 - 134/67)  ABP(mean): 72 (10-29-17 @ 03:15) (59 - 94)  Wt(kg): --  CVP(cm H2O): --      Exam:  Cardiac Rhythm: NSR  Perfusion     [ ]Adequate   [ ]Inadequate  Mentation   [ ]Normal       [ ]Reduced  Extremities  [ ]Warm         [ ]Cool  Volume Status [ ]Hypervolemic [ ]Euvolemic [ ]Hypovolemic  Meds: norepinephrine Infusion 0.012 MICROgram(s)/kG/Min IV Continuous <Continuous>        GI/NUTRITION  Exam: Abdomen soft, Non-tender, Non-distended. Midline incision w/ dressing and minimal strikethrough. Ostomy pink and viable.  Current Diet:  NPO except medications  Meds: pantoprazole Injectable 40 milliGRAM(s) IV Push daily      GENITOURINARY  I&O's Detail    10-27 @ 07:01  -  10-28 @ 07:00  --------------------------------------------------------  IN:    dexmedetomidine Infusion: 25 mL    lactated ringers.: 1000 mL    sodium chloride 0.9%: 100 mL    Sodium Chloride 0.9% IV Bolus: 1000 mL    sodium chloride 0.9%.: 650 mL    Solution: 375 mL    Solution: 250 mL  Total IN: 3400 mL    OUT:    Indwelling Catheter - Urethral: 175 mL  Total OUT: 175 mL    Total NET: 3225 mL      10-28 @ 07:01  -  10-29 @ 03:37  --------------------------------------------------------  IN:    dexmedetomidine Infusion: 49.2 mL    norepinephrine Infusion: 168.4 mL    phenylephrine   Infusion: 176.8 mL    sodium chloride 0.9%.: 2250 mL    Solution: 2000 mL    Solution: 400 mL    Solution: 250 mL    Solution: 250 mL  Total IN: 5544.4 mL    OUT:    Colostomy: 20 mL    Indwelling Catheter - Urethral: 795 mL    Nasoenteral Tube: 150 mL  Total OUT: 965 mL    Total NET: 4579.4 mL      10-28    136  |  108  |  14  ----------------------------<  144<H>  4.4   |  17<L>  |  0.72    Ca    7.0<L>      28 Oct 2017 17:15  Phos  3.6     10-28  Mg     3.0     10-28      [ ] Miles catheter, indication: N/A  Meds: calcium gluconate IVPB 2 Gram(s) IV Intermittent once  sodium chloride 0.9%. 1000 milliLiter(s) IV Continuous <Continuous>    HEMATOLOGIC  Meds: enoxaparin Injectable 40 milliGRAM(s) SubCutaneous daily    [x] VTE Prophylaxis                        11.3   13.4  )-----------( 212      ( 29 Oct 2017 03:09 )             32.3       Transfusion     [ ] PRBC   [ ] Platelets   [ ] FFP   [ ] Cryoprecipitate    INFECTIOUS DISEASES  T(C): 36.6 (10-29-17 @ 03:00), Max: 37 (10-28-17 @ 19:00)  Wt(kg): --  WBC Count: 13.4 K/uL (10-29 @ 03:09)  WBC Count: 8.9 K/uL (10-28 @ 17:15)  WBC Count: 3.7 K/uL (10-28 @ 10:26)    Recent Cultures:    Meds: piperacillin/tazobactam IVPB. 3.375 Gram(s) IV Intermittent every 8 hours  vancomycin  IVPB 1000 milliGRAM(s) IV Intermittent every 12 hours  vancomycin  IVPB          ENDOCRINE  Capillary Blood Glucose    Meds: insulin lispro (HumaLOG) corrective regimen sliding scale   SubCutaneous every 6 hours  levothyroxine Injectable 37.5 MICROGram(s) IV Push daily  simvastatin 40 milliGRAM(s) Oral at bedtime      ACCESS DEVICES:  [ ] Peripheral IV  [ ] Central Venous Line	[ ] R	[ ] L	[ ] IJ	[ ] Fem	[ ] SC	Placed:   [ ] Arterial Line		[ ] R	[ ] L	[ ] Fem	[ ] Rad	[ ] Ax	Placed:   [ ] PICC:					[ ] Mediport  [ ] Urinary Catheter, Date Placed:   [ ] Necessity of urinary, arterial, and venous catheters discussed    OTHER MEDICATIONS:      CODE STATUS:     IMAGING:

## 2017-10-30 DIAGNOSIS — K56.609 UNSPECIFIED INTESTINAL OBSTRUCTION, UNSPECIFIED AS TO PARTIAL VERSUS COMPLETE OBSTRUCTION: ICD-10-CM

## 2017-10-30 LAB
ANION GAP SERPL CALC-SCNC: 11 MMOL/L — SIGNIFICANT CHANGE UP (ref 5–17)
ANION GAP SERPL CALC-SCNC: 12 MMOL/L — SIGNIFICANT CHANGE UP (ref 5–17)
APTT BLD: 35.4 SEC — SIGNIFICANT CHANGE UP (ref 27.5–37.4)
BUN SERPL-MCNC: 10 MG/DL — SIGNIFICANT CHANGE UP (ref 7–23)
BUN SERPL-MCNC: 11 MG/DL — SIGNIFICANT CHANGE UP (ref 7–23)
CALCIUM SERPL-MCNC: 7.7 MG/DL — LOW (ref 8.4–10.5)
CALCIUM SERPL-MCNC: 8.5 MG/DL — SIGNIFICANT CHANGE UP (ref 8.4–10.5)
CHLORIDE SERPL-SCNC: 105 MMOL/L — SIGNIFICANT CHANGE UP (ref 96–108)
CHLORIDE SERPL-SCNC: 107 MMOL/L — SIGNIFICANT CHANGE UP (ref 96–108)
CO2 SERPL-SCNC: 20 MMOL/L — LOW (ref 22–31)
CO2 SERPL-SCNC: 26 MMOL/L — SIGNIFICANT CHANGE UP (ref 22–31)
CREAT SERPL-MCNC: 0.6 MG/DL — SIGNIFICANT CHANGE UP (ref 0.5–1.3)
CREAT SERPL-MCNC: 0.62 MG/DL — SIGNIFICANT CHANGE UP (ref 0.5–1.3)
GLUCOSE SERPL-MCNC: 100 MG/DL — HIGH (ref 70–99)
GLUCOSE SERPL-MCNC: 96 MG/DL — SIGNIFICANT CHANGE UP (ref 70–99)
HCT VFR BLD CALC: 29.4 % — LOW (ref 39–50)
HGB BLD-MCNC: 10.4 G/DL — LOW (ref 13–17)
INR BLD: 1.17 RATIO — HIGH (ref 0.88–1.16)
MAGNESIUM SERPL-MCNC: 2 MG/DL — SIGNIFICANT CHANGE UP (ref 1.6–2.6)
MAGNESIUM SERPL-MCNC: 2.2 MG/DL — SIGNIFICANT CHANGE UP (ref 1.6–2.6)
MCHC RBC-ENTMCNC: 33.1 PG — SIGNIFICANT CHANGE UP (ref 27–34)
MCHC RBC-ENTMCNC: 35.3 GM/DL — SIGNIFICANT CHANGE UP (ref 32–36)
MCV RBC AUTO: 93.6 FL — SIGNIFICANT CHANGE UP (ref 80–100)
PHOSPHATE SERPL-MCNC: 1.6 MG/DL — LOW (ref 2.5–4.5)
PHOSPHATE SERPL-MCNC: 1.8 MG/DL — LOW (ref 2.5–4.5)
PLATELET # BLD AUTO: 117 K/UL — LOW (ref 150–400)
POTASSIUM SERPL-MCNC: 3.1 MMOL/L — LOW (ref 3.5–5.3)
POTASSIUM SERPL-MCNC: 3.3 MMOL/L — LOW (ref 3.5–5.3)
POTASSIUM SERPL-SCNC: 3.1 MMOL/L — LOW (ref 3.5–5.3)
POTASSIUM SERPL-SCNC: 3.3 MMOL/L — LOW (ref 3.5–5.3)
PROTHROM AB SERPL-ACNC: 12.7 SEC — SIGNIFICANT CHANGE UP (ref 9.8–12.7)
RBC # BLD: 3.14 M/UL — LOW (ref 4.2–5.8)
RBC # FLD: 12 % — SIGNIFICANT CHANGE UP (ref 10.3–14.5)
SODIUM SERPL-SCNC: 139 MMOL/L — SIGNIFICANT CHANGE UP (ref 135–145)
SODIUM SERPL-SCNC: 142 MMOL/L — SIGNIFICANT CHANGE UP (ref 135–145)
WBC # BLD: 9.6 K/UL — SIGNIFICANT CHANGE UP (ref 3.8–10.5)
WBC # FLD AUTO: 9.6 K/UL — SIGNIFICANT CHANGE UP (ref 3.8–10.5)

## 2017-10-30 PROCEDURE — 71010: CPT | Mod: 26

## 2017-10-30 PROCEDURE — 99233 SBSQ HOSP IP/OBS HIGH 50: CPT

## 2017-10-30 PROCEDURE — 71260 CT THORAX DX C+: CPT | Mod: 26

## 2017-10-30 RX ORDER — POTASSIUM CHLORIDE 20 MEQ
10 PACKET (EA) ORAL
Qty: 0 | Refills: 0 | Status: COMPLETED | OUTPATIENT
Start: 2017-10-30 | End: 2017-10-30

## 2017-10-30 RX ORDER — CALCIUM GLUCONATE 100 MG/ML
2 VIAL (ML) INTRAVENOUS ONCE
Qty: 0 | Refills: 0 | Status: COMPLETED | OUTPATIENT
Start: 2017-10-30 | End: 2017-10-30

## 2017-10-30 RX ORDER — POTASSIUM CHLORIDE 20 MEQ
20 PACKET (EA) ORAL
Qty: 0 | Refills: 0 | Status: COMPLETED | OUTPATIENT
Start: 2017-10-30 | End: 2017-10-30

## 2017-10-30 RX ORDER — HYDROMORPHONE HYDROCHLORIDE 2 MG/ML
0.5 INJECTION INTRAMUSCULAR; INTRAVENOUS; SUBCUTANEOUS ONCE
Qty: 0 | Refills: 0 | Status: DISCONTINUED | OUTPATIENT
Start: 2017-10-30 | End: 2017-10-30

## 2017-10-30 RX ORDER — MAGNESIUM SULFATE 500 MG/ML
2 VIAL (ML) INJECTION
Qty: 0 | Refills: 0 | Status: COMPLETED | OUTPATIENT
Start: 2017-10-30 | End: 2017-10-30

## 2017-10-30 RX ORDER — SIMVASTATIN 20 MG/1
40 TABLET, FILM COATED ORAL AT BEDTIME
Qty: 0 | Refills: 0 | Status: DISCONTINUED | OUTPATIENT
Start: 2017-10-30 | End: 2017-11-05

## 2017-10-30 RX ORDER — HYDROMORPHONE HYDROCHLORIDE 2 MG/ML
0.5 INJECTION INTRAMUSCULAR; INTRAVENOUS; SUBCUTANEOUS EVERY 4 HOURS
Qty: 0 | Refills: 0 | Status: DISCONTINUED | OUTPATIENT
Start: 2017-10-30 | End: 2017-10-31

## 2017-10-30 RX ORDER — LEVOTHYROXINE SODIUM 125 MCG
37.5 TABLET ORAL DAILY
Qty: 0 | Refills: 0 | Status: DISCONTINUED | OUTPATIENT
Start: 2017-10-30 | End: 2017-10-31

## 2017-10-30 RX ORDER — LEVOTHYROXINE SODIUM 125 MCG
75 TABLET ORAL DAILY
Qty: 0 | Refills: 0 | Status: DISCONTINUED | OUTPATIENT
Start: 2017-10-30 | End: 2017-10-30

## 2017-10-30 RX ORDER — DEXTROSE MONOHYDRATE, SODIUM CHLORIDE, AND POTASSIUM CHLORIDE 50; .745; 4.5 G/1000ML; G/1000ML; G/1000ML
1000 INJECTION, SOLUTION INTRAVENOUS
Qty: 0 | Refills: 0 | Status: DISCONTINUED | OUTPATIENT
Start: 2017-10-30 | End: 2017-11-03

## 2017-10-30 RX ORDER — ONDANSETRON 8 MG/1
4 TABLET, FILM COATED ORAL ONCE
Qty: 0 | Refills: 0 | Status: COMPLETED | OUTPATIENT
Start: 2017-10-30 | End: 2017-10-30

## 2017-10-30 RX ADMIN — ENOXAPARIN SODIUM 40 MILLIGRAM(S): 100 INJECTION SUBCUTANEOUS at 13:14

## 2017-10-30 RX ADMIN — Medication 100 MILLIEQUIVALENT(S): at 21:12

## 2017-10-30 RX ADMIN — Medication 50 MILLIEQUIVALENT(S): at 06:14

## 2017-10-30 RX ADMIN — Medication 50 GRAM(S): at 21:12

## 2017-10-30 RX ADMIN — Medication 100 MILLIEQUIVALENT(S): at 19:58

## 2017-10-30 RX ADMIN — HYDROMORPHONE HYDROCHLORIDE 0.5 MILLIGRAM(S): 2 INJECTION INTRAMUSCULAR; INTRAVENOUS; SUBCUTANEOUS at 07:23

## 2017-10-30 RX ADMIN — Medication 20 MILLIEQUIVALENT(S): at 20:04

## 2017-10-30 RX ADMIN — Medication 50 MILLIEQUIVALENT(S): at 03:59

## 2017-10-30 RX ADMIN — Medication 400 GRAM(S): at 10:02

## 2017-10-30 RX ADMIN — Medication 63.75 MILLIMOLE(S): at 07:07

## 2017-10-30 RX ADMIN — Medication 2: at 23:55

## 2017-10-30 RX ADMIN — Medication 63.75 MILLIMOLE(S): at 06:04

## 2017-10-30 RX ADMIN — Medication 127.5 MILLIMOLE(S): at 22:20

## 2017-10-30 RX ADMIN — PIPERACILLIN AND TAZOBACTAM 25 GRAM(S): 4; .5 INJECTION, POWDER, LYOPHILIZED, FOR SOLUTION INTRAVENOUS at 17:02

## 2017-10-30 RX ADMIN — Medication 37.5 MICROGRAM(S): at 06:13

## 2017-10-30 RX ADMIN — HYDROMORPHONE HYDROCHLORIDE 0.5 MILLIGRAM(S): 2 INJECTION INTRAMUSCULAR; INTRAVENOUS; SUBCUTANEOUS at 10:01

## 2017-10-30 RX ADMIN — PIPERACILLIN AND TAZOBACTAM 25 GRAM(S): 4; .5 INJECTION, POWDER, LYOPHILIZED, FOR SOLUTION INTRAVENOUS at 06:13

## 2017-10-30 RX ADMIN — PIPERACILLIN AND TAZOBACTAM 25 GRAM(S): 4; .5 INJECTION, POWDER, LYOPHILIZED, FOR SOLUTION INTRAVENOUS at 22:19

## 2017-10-30 RX ADMIN — HYDROMORPHONE HYDROCHLORIDE 0.5 MILLIGRAM(S): 2 INJECTION INTRAMUSCULAR; INTRAVENOUS; SUBCUTANEOUS at 12:12

## 2017-10-30 RX ADMIN — Medication 127.5 MILLIMOLE(S): at 23:49

## 2017-10-30 RX ADMIN — Medication 50 MILLIEQUIVALENT(S): at 05:23

## 2017-10-30 RX ADMIN — SIMVASTATIN 40 MILLIGRAM(S): 20 TABLET, FILM COATED ORAL at 22:19

## 2017-10-30 RX ADMIN — Medication 20 MILLIEQUIVALENT(S): at 21:13

## 2017-10-30 RX ADMIN — Medication 50 GRAM(S): at 19:58

## 2017-10-30 RX ADMIN — ONDANSETRON 4 MILLIGRAM(S): 8 TABLET, FILM COATED ORAL at 00:33

## 2017-10-30 RX ADMIN — HYDROMORPHONE HYDROCHLORIDE 0.5 MILLIGRAM(S): 2 INJECTION INTRAMUSCULAR; INTRAVENOUS; SUBCUTANEOUS at 07:03

## 2017-10-30 NOTE — PROGRESS NOTE ADULT - SUBJECTIVE AND OBJECTIVE BOX
HISTORY  82M PHx HLD & hypothyroidism w/out PSHx presented w/ abdominal pain, found to have LBO at splenic flexure w/ evidence of pancreatic mass, peritoneal & lung nodules. He underwent a flexible sigmoidoscopy on 10/27 for decompression and colonic stent placement. In the PACU, he had abdominal pain and was found to have free intraperitoneal air on CXR. Patient was taken emergently to the OR and a large serosal tear was found in the transverse colon. He underwent resection of transverse colon and proximal left colon, and end colostomy. Heavy tumor burden seen throughout small bowel and omentum.      24 HOUR EVENTS: extubated, now normotensive- d/c'd levo, good UOP, d/c'd vanc     SUBJECTIVE/ROS:  [ ] A ten-point review of systems was otherwise negative except as noted.  [ ] Due to altered mental status/intubation, subjective information were not able to be obtained from the patient. History was obtained, to the extent possible, from review of the chart and collateral sources of information.      NEURO  Exam: A&Ox3  Meds: HYDROmorphone  Injectable 0.5 milliGRAM(s) IV Push every 4 hours PRN Moderate Pain (4 - 6)    [x] Adequacy of sedation and pain control has been assessed and adjusted      RESPIRATORY  RR: 18 (10-30-17 @ 01:00) (9 - 29)  SpO2: 94% (10-30-17 @ 01:00) (92% - 100%)  Wt(kg): --  Exam: unlabored, clear to auscultation bilaterally  Mechanical Ventilation: Mode: CPAP with PS, FiO2: 30, PEEP: 5, MAP: 8, PIP: 10  ABG - ( 29 Oct 2017 10:46 )  pH: 7.42  /  pCO2: 32    /  pO2: 131   / HCO3: 20    / Base Excess: -3.0  /  SaO2: 99      Lactate: x                [ ] Extubation Readiness Assessed  Meds:       CARDIOVASCULAR  HR: 108 (10-30-17 @ 01:00) (57 - 114)  BP: 116/64 (10-29-17 @ 19:40) (116/64 - 122/62)  BP(mean): 84 (10-29-17 @ 19:40) (84 - 84)  ABP: 128/60 (10-30-17 @ 01:00) (90/46 - 149/62)  ABP(mean): 85 (10-30-17 @ 01:00) (63 - 99)  Wt(kg): --  CVP(cm H2O): --      Exam:  Cardiac Rhythm: NSR  Perfusion     [x ]Adequate   [ ]Inadequate  Mentation   [x ]Normal       [ ]Reduced  Extremities  [x ]Warm         [ ]Cool  Volume Status [ ]Hypervolemic [ ]Euvolemic [ ]Hypovolemic  Meds:       GI/NUTRITION  Exam: Abdomen soft, Non-tender, Non-distended. Midline incision w/ dressing and minimal strikethrough. Ostomy pink and viable.  Current Diet:  NPO except medications    Meds: pantoprazole  Injectable 40 milliGRAM(s) IV Push daily      GENITOURINARY  I&O's Detail    10-28 @ 07:01  -  10-29 @ 07:00  --------------------------------------------------------  IN:    dexmedetomidine Infusion: 74.4 mL    norepinephrine Infusion: 260.1 mL    phenylephrine   Infusion: 176.8 mL    sodium chloride 0.9%.: 3000 mL    Solution: 375 mL    Solution: 400 mL    Solution: 2100 mL    Solution: 500 mL  Total IN: 6886.3 mL    OUT:    Colostomy: 30 mL    Indwelling Catheter - Urethral: 1285 mL    Nasoenteral Tube: 150 mL  Total OUT: 1465 mL    Total NET: 5421.3 mL      10-29 @ 07:01  -  10-30 @ 01:49  --------------------------------------------------------  IN:    dexmedetomidine Infusion: 14.2 mL    norepinephrine Infusion: 94.7 mL    sodium chloride 0.9%.: 1575 mL    Solution: 200 mL  Total IN: 1883.9 mL    OUT:    Colostomy: 5 mL    Indwelling Catheter - Urethral: 2060 mL    Nasoenteral Tube: 15 mL  Total OUT: 2080 mL    Total NET: -196.1 mL          10-29    138  |  107  |  14  ----------------------------<  124<H>  4.1   |  21<L>  |  0.71    Ca    7.1<L>      29 Oct 2017 03:09  Phos  3.1     10-29  Mg     2.6     10-29      [ ] Miles catheter, indication: N/A  Meds: sodium chloride 0.9%. 1000 milliLiter(s) IV Continuous <Continuous>        HEMATOLOGIC  Meds: enoxaparin Injectable 40 milliGRAM(s) SubCutaneous daily    [x] VTE Prophylaxis                        11.3   13.4  )-----------( 212      ( 29 Oct 2017 03:09 )             32.3     PT/INR - ( 29 Oct 2017 03:09 )   PT: 14.9 sec;   INR: 1.00 ratio         PTT - ( 29 Oct 2017 03:09 )  PTT:37.5 sec  Transfusion     [ ] PRBC   [ ] Platelets   [ ] FFP   [ ] Cryoprecipitate      INFECTIOUS DISEASES  T(C): 37 (10-29-17 @ 23:00), Max: 37 (10-29-17 @ 23:00)  Wt(kg): --  WBC Count: 13.4 K/uL (10-29 @ 03:09)    Recent Cultures:    Meds: piperacillin/tazobactam IVPB. 3.375 Gram(s) IV Intermittent every 8 hours        ENDOCRINE  Capillary Blood Glucose    Meds: insulin lispro (HumaLOG) corrective regimen sliding scale   SubCutaneous every 6 hours  levothyroxine Injectable 37.5 MICROGram(s) IV Push daily  simvastatin 40 milliGRAM(s) Oral at bedtime        ACCESS DEVICES:  [ ] Peripheral IV  [ ] Central Venous Line	[ ] R	[ ] L	[ ] IJ	[ ] Fem	[ ] SC	Placed:   [ ] Arterial Line		[ ] R	[ ] L	[ ] Fem	[ ] Rad	[ ] Ax	Placed:   [ ] PICC:					[ ] Mediport  [ ] Urinary Catheter, Date Placed:   [ ] Necessity of urinary, arterial, and venous catheters discussed    OTHER MEDICATIONS:      CODE STATUS:     IMAGING:

## 2017-10-30 NOTE — DIETITIAN INITIAL EVALUATION ADULT. - ENERGY NEEDS
ht: 5 feet 3 inches, wt: 130 pounds, BMI: 23 Kg/m2, IBW: 124 pounds (+/- 10%), 105% IBW. Edema: 1+ generalized; Skin: no pressure injuries.

## 2017-10-30 NOTE — PROGRESS NOTE ADULT - ASSESSMENT
ASSESSMENT:  82y Male with peritoneal carcinomatosis, colonic perforation following sigmoidoscopy now s/p resection of transverse colon and proximal left colon with end colostomy.    PLAN:   Neurologic:   - C/w sedation as needed for ventilator  - Postoperative pain control PRN    Respiratory:   - extubated, tolerated well  - encourage incentive spirometry    Cardiovascular:   - normotensive, off of levo  - D/C A line in AM    Gastrointestinal/Nutrition:   - NPO except meds: consider advancing diet    Renal/Genitourinary:   - continue with indwelling urethral catheter    Hematologic:   - Lovenox for DVT ppx    Infectious Disease:   - Vancomycin and Zosyn for fecal peritonitis    Lines/Tubes:  - PIV, Miles    Endocrine:   - C/w Synthroid    Disposition: SICU

## 2017-10-30 NOTE — DIETITIAN INITIAL EVALUATION ADULT. - OTHER INFO
Nutrition Assessment warranted for length of stay in SICU. Pt is a 82y Male with peritoneal carcinomatosis, colonic perforation following sigmoidoscopy now S/P 10/28 resection of transverse colon and proximal left colon with end colostomy.

## 2017-10-30 NOTE — PROGRESS NOTE ADULT - ASSESSMENT
ASSESSMENT:  82y Male with peritoneal carcinomatosis, colonic perforation following sigmoidoscopy now s/p resection of transverse colon and proximal left colon with end colostomy.    PLAN:   - Patient currently off levo  - Postoperative pain control PRN  - NPO  - NGT to wall suction  - C/w Miles  - Lovenox for DVT ppx  - Zosyn for fecal peritonitis; vanco d/c'd  - Management per SICU team    Patient seen and examined with Dr. Boggs, case discussed with SICU resident (Kirti)     Jus Villarreal, PGY-1  [facility] [phone] ASSESSMENT:  82y Male with peritoneal carcinomatosis, colonic perforation following sigmoidoscopy now s/p resection of transverse colon and proximal left colon with end colostomy.    PLAN:   - Patient currently off levo  - Postoperative pain control PRN  - NPO  - Miles d/c'd  - Lovenox for DVT ppx  - Zosyn for fecal peritonitis; vanco d/c'd  - Management per SICU team    Jus Villarreal, PGY-1  [facility] [phone]

## 2017-10-30 NOTE — PROGRESS NOTE ADULT - SUBJECTIVE AND OBJECTIVE BOX
Chief Complaint:  Patient is a 82y old  Male who presents with a chief complaint of LBO, no BM x2wk (26 Oct 2017 16:16)      Interval Events: Patient has been extubated. He is sitting up in a chair. No output per ostomy yet. No nausea or vomiting. Patient remains NPO at this time. No fevers of chills at this time.     Allergies:  No Known Allergies      Hospital Medications:  dextrose 5% + sodium chloride 0.45% with potassium chloride 20 mEq/L 1000 milliLiter(s) IV Continuous <Continuous>  enoxaparin Injectable 40 milliGRAM(s) SubCutaneous daily  HYDROmorphone  Injectable 0.5 milliGRAM(s) IV Push every 4 hours PRN  insulin lispro (HumaLOG) corrective regimen sliding scale   SubCutaneous every 6 hours  levothyroxine Injectable 37.5 MICROGram(s) IV Push daily  piperacillin/tazobactam IVPB. 3.375 Gram(s) IV Intermittent every 8 hours  simvastatin 40 milliGRAM(s) Oral at bedtime      PMHX/PSHX:  Hypothyroid  Hyperlipemia  No significant past surgical history      Family history:  No pertinent family history in first degree relatives      ROS:     General:  No wt loss, fevers, chills, night sweats, fatigue   Eyes:  Good vision, no reported pain  ENT:  No sore throat, pain, runny nose  CV:  No chest pain, palpitations  Resp:  No cough, wheezing, SOB  GI:  See HPI  :  No pain, bleeding, incontinence, nocturia  Muscle:  No pain, weakness  Neuro:  No weakness, tingling, memory problems  Psych:  (+) fatigue, no insomnia, mood problems, depression  Endocrine:  No cold/heat intolerance  Heme:  No petechiae, ecchymosis, easy bruising  Skin:  No rash, edema      PHYSICAL EXAM:   Vital Signs:  Vital Signs Last 24 Hrs  T(C): 37.1 (30 Oct 2017 11:00), Max: 37.3 (30 Oct 2017 03:00)  T(F): 98.8 (30 Oct 2017 11:00), Max: 99.1 (30 Oct 2017 03:00)  HR: 95 (30 Oct 2017 13:00) (83 - 114)  BP: 154/96 (30 Oct 2017 13:00) (116/64 - 156/77)  BP(mean): 115 (30 Oct 2017 13:00) (84 - 115)  RR: 21 (30 Oct 2017 13:00) (9 - 25)  SpO2: 99% (30 Oct 2017 13:00) (91% - 99%)  Daily     Daily Weight in k (30 Oct 2017 11:44)    GENERAL:  NAD  HEENT:  sclera anicteric  CHEST:  no respiratory distress, CTAB  HEART:  RRR, no MRG, no edema  ABDOMEN:  R sided ostomy - appears healthy, appropriately tender, soft, non-distended   EXTREMITIES:  no cyanosis, no edema  SKIN:  No rash  NEURO:  Alert, oriented      LABS:                        10.4   9.6   )-----------( 117      ( 30 Oct 2017 02:41 )             29.4     10-30    139  |  107  |  11  ----------------------------<  100<H>  3.1<L>   |  20<L>  |  0.62    Ca    7.7<L>      30 Oct 2017 02:41  Phos  1.8     10-30  Mg     2.2     10-30        PT/INR - ( 30 Oct 2017 02:41 )   PT: 12.7 sec;   INR: 1.17 ratio         PTT - ( 30 Oct 2017 02:41 )  PTT:35.4 sec      Imaging:

## 2017-10-30 NOTE — PROVIDER CONTACT NOTE (OTHER) - ASSESSMENT
pt c/o not being able to belch, states that his stomach feels like it is full of gas. NG tube in place, no difficulty flushing, bilious drainage being removed via NG tube from stomach

## 2017-10-30 NOTE — PROVIDER CONTACT NOTE (OTHER) - BACKGROUND
pt admitted for Large bowel obstruction, went to OR for extended left hemicolectomy with end ileostomy and abdominal washout. NG tube in place

## 2017-10-30 NOTE — DIETITIAN INITIAL EVALUATION ADULT. - FACTORS AFF FOOD INTAKE
Pt NPO x 5 days, now S/P resection with end colostomy (15ml output in 24-hours); plan for diet advancement pending return of GI function. NGT output x 24-hours = 365ml.

## 2017-10-30 NOTE — DIETITIAN INITIAL EVALUATION ADULT. - NS AS NUTRI INTERV MEALS SNACK
General/healthful diet/Advance diet as tolerated to Regular; encourage intake of high protein, nutrient dense foods Advance diet as tolerated to Low Fiber; encourage intake of high protein, nutrient dense foods/Fiber - modified diet

## 2017-10-30 NOTE — DIETITIAN INITIAL EVALUATION ADULT. - NS AS NUTRI INTERV MEDICAL AND FOOD SUPPLEMENTS
Commercial beverage/Pending diet advancement, recommend add Ensure Enlive tid to help meet increased needs

## 2017-10-30 NOTE — PROGRESS NOTE ADULT - ASSESSMENT
81 yo man with pancreatic tail mass with peritoneal carcinomatosis s/p flex sig with stent placement c/b colonic perforation now s/p ex-lap with L hemicolectomy and diverting colostomy. Patient is now extubated. Awaiting return of bowel function via ostomy.

## 2017-10-30 NOTE — DIETITIAN INITIAL EVALUATION ADULT. - ORAL INTAKE PTA
Pt interview pending. Per chart review, pt takes no nutrition supplements, reports NKFA. Unable to assess. Per chart review, pt takes no nutrition supplements, reports NKFA.

## 2017-10-30 NOTE — DIETITIAN INITIAL EVALUATION ADULT. - SIGNS/SYMPTOMS
pt with peritoneal carcinomatosis, colonic perforation; S/P 10/28 colon resection with end colostomy

## 2017-10-31 LAB
ANION GAP SERPL CALC-SCNC: 13 MMOL/L — SIGNIFICANT CHANGE UP (ref 5–17)
BUN SERPL-MCNC: 7 MG/DL — SIGNIFICANT CHANGE UP (ref 7–23)
CA-I BLD-SCNC: 1.07 MMOL/L — LOW (ref 1.12–1.3)
CALCIUM SERPL-MCNC: 8.2 MG/DL — LOW (ref 8.4–10.5)
CHLORIDE SERPL-SCNC: 102 MMOL/L — SIGNIFICANT CHANGE UP (ref 96–108)
CO2 SERPL-SCNC: 26 MMOL/L — SIGNIFICANT CHANGE UP (ref 22–31)
CREAT SERPL-MCNC: 0.65 MG/DL — SIGNIFICANT CHANGE UP (ref 0.5–1.3)
GLUCOSE SERPL-MCNC: 123 MG/DL — HIGH (ref 70–99)
HCT VFR BLD CALC: 31.3 % — LOW (ref 39–50)
HGB BLD-MCNC: 11 G/DL — LOW (ref 13–17)
MAGNESIUM SERPL-MCNC: 2.6 MG/DL — SIGNIFICANT CHANGE UP (ref 1.6–2.6)
MCHC RBC-ENTMCNC: 32.7 PG — SIGNIFICANT CHANGE UP (ref 27–34)
MCHC RBC-ENTMCNC: 35 GM/DL — SIGNIFICANT CHANGE UP (ref 32–36)
MCV RBC AUTO: 93.4 FL — SIGNIFICANT CHANGE UP (ref 80–100)
PHOSPHATE SERPL-MCNC: 4 MG/DL — SIGNIFICANT CHANGE UP (ref 2.5–4.5)
PLATELET # BLD AUTO: 135 K/UL — LOW (ref 150–400)
POTASSIUM SERPL-MCNC: 3.3 MMOL/L — LOW (ref 3.5–5.3)
POTASSIUM SERPL-SCNC: 3.3 MMOL/L — LOW (ref 3.5–5.3)
RBC # BLD: 3.35 M/UL — LOW (ref 4.2–5.8)
RBC # FLD: 12.1 % — SIGNIFICANT CHANGE UP (ref 10.3–14.5)
SODIUM SERPL-SCNC: 141 MMOL/L — SIGNIFICANT CHANGE UP (ref 135–145)
WBC # BLD: 12.2 K/UL — HIGH (ref 3.8–10.5)
WBC # FLD AUTO: 12.2 K/UL — HIGH (ref 3.8–10.5)

## 2017-10-31 PROCEDURE — 71010: CPT | Mod: 26

## 2017-10-31 RX ORDER — INSULIN LISPRO 100/ML
VIAL (ML) SUBCUTANEOUS
Qty: 0 | Refills: 0 | Status: DISCONTINUED | OUTPATIENT
Start: 2017-10-31 | End: 2017-11-02

## 2017-10-31 RX ORDER — OXYCODONE HYDROCHLORIDE 5 MG/1
5 TABLET ORAL EVERY 6 HOURS
Qty: 0 | Refills: 0 | Status: DISCONTINUED | OUTPATIENT
Start: 2017-10-31 | End: 2017-11-02

## 2017-10-31 RX ORDER — POTASSIUM CHLORIDE 20 MEQ
10 PACKET (EA) ORAL
Qty: 0 | Refills: 0 | Status: COMPLETED | OUTPATIENT
Start: 2017-10-31 | End: 2017-10-31

## 2017-10-31 RX ORDER — ACETAMINOPHEN 500 MG
650 TABLET ORAL EVERY 6 HOURS
Qty: 0 | Refills: 0 | Status: DISCONTINUED | OUTPATIENT
Start: 2017-10-31 | End: 2017-11-05

## 2017-10-31 RX ORDER — CALCIUM GLUCONATE 100 MG/ML
2 VIAL (ML) INTRAVENOUS ONCE
Qty: 0 | Refills: 0 | Status: COMPLETED | OUTPATIENT
Start: 2017-10-31 | End: 2017-10-31

## 2017-10-31 RX ORDER — OXYCODONE HYDROCHLORIDE 5 MG/1
10 TABLET ORAL EVERY 6 HOURS
Qty: 0 | Refills: 0 | Status: DISCONTINUED | OUTPATIENT
Start: 2017-10-31 | End: 2017-11-05

## 2017-10-31 RX ORDER — HYDROMORPHONE HYDROCHLORIDE 2 MG/ML
0.5 INJECTION INTRAMUSCULAR; INTRAVENOUS; SUBCUTANEOUS
Qty: 0 | Refills: 0 | Status: DISCONTINUED | OUTPATIENT
Start: 2017-10-31 | End: 2017-11-05

## 2017-10-31 RX ORDER — LEVOTHYROXINE SODIUM 125 MCG
75 TABLET ORAL DAILY
Qty: 0 | Refills: 0 | Status: DISCONTINUED | OUTPATIENT
Start: 2017-10-31 | End: 2017-11-05

## 2017-10-31 RX ADMIN — PIPERACILLIN AND TAZOBACTAM 25 GRAM(S): 4; .5 INJECTION, POWDER, LYOPHILIZED, FOR SOLUTION INTRAVENOUS at 05:51

## 2017-10-31 RX ADMIN — ENOXAPARIN SODIUM 40 MILLIGRAM(S): 100 INJECTION SUBCUTANEOUS at 12:24

## 2017-10-31 RX ADMIN — OXYCODONE HYDROCHLORIDE 5 MILLIGRAM(S): 5 TABLET ORAL at 12:24

## 2017-10-31 RX ADMIN — Medication 100 MILLIEQUIVALENT(S): at 05:52

## 2017-10-31 RX ADMIN — Medication 37.5 MICROGRAM(S): at 05:51

## 2017-10-31 RX ADMIN — Medication 100 MILLIEQUIVALENT(S): at 03:46

## 2017-10-31 RX ADMIN — Medication 100 MILLIEQUIVALENT(S): at 04:54

## 2017-10-31 RX ADMIN — PIPERACILLIN AND TAZOBACTAM 25 GRAM(S): 4; .5 INJECTION, POWDER, LYOPHILIZED, FOR SOLUTION INTRAVENOUS at 21:44

## 2017-10-31 RX ADMIN — Medication 200 GRAM(S): at 05:57

## 2017-10-31 RX ADMIN — OXYCODONE HYDROCHLORIDE 5 MILLIGRAM(S): 5 TABLET ORAL at 01:25

## 2017-10-31 RX ADMIN — SIMVASTATIN 40 MILLIGRAM(S): 20 TABLET, FILM COATED ORAL at 21:44

## 2017-10-31 RX ADMIN — PIPERACILLIN AND TAZOBACTAM 25 GRAM(S): 4; .5 INJECTION, POWDER, LYOPHILIZED, FOR SOLUTION INTRAVENOUS at 14:11

## 2017-10-31 RX ADMIN — Medication 127.5 MILLIMOLE(S): at 01:11

## 2017-10-31 RX ADMIN — Medication 1: at 17:13

## 2017-10-31 NOTE — PROGRESS NOTE ADULT - ASSESSMENT
83 yo man with pancreatic tail mass with peritoneal carcinomatosis s/p flex sig with stent placement c/b colonic perforation now s/p ex-lap with L hemicolectomy and diverting colostomy. Patient is out of the ICU, with return of bowel function.

## 2017-10-31 NOTE — PROGRESS NOTE ADULT - SUBJECTIVE AND OBJECTIVE BOX
GENERAL SURGERY DAILY PROGRESS NOTE:     Subjective: Patient seen and examined. Patient stable with no overnight events. Patient denies CP/ SOB/ Palpatations. Patient denies N/V. Reports air and ostomy output in the bag.     MEDICATIONS  (STANDING):  dextrose 5% + sodium chloride 0.45% with potassium chloride 20 mEq/L 1000 milliLiter(s) (100 mL/Hr) IV Continuous <Continuous>  enoxaparin Injectable 40 milliGRAM(s) SubCutaneous daily  insulin lispro (HumaLOG) corrective regimen sliding scale   SubCutaneous every 6 hours  levothyroxine 75 MICROGram(s) Oral daily  oxyCODONE    IR 5 milliGRAM(s) Oral every 6 hours  piperacillin/tazobactam IVPB. 3.375 Gram(s) IV Intermittent every 8 hours  simvastatin 40 milliGRAM(s) Oral at bedtime    MEDICATIONS  (PRN):  acetaminophen   Tablet. 650 milliGRAM(s) Oral every 6 hours PRN Mild Pain (1 - 3)  HYDROmorphone  Injectable 0.5 milliGRAM(s) IV Push every 3 hours PRN Severe Breakthrough Pain  oxyCODONE    IR 10 milliGRAM(s) Oral every 6 hours PRN Severe Pain (7 - 10)    Vital Signs Last 24 Hrs:  T(C): 36.9 (31 Oct 2017 13:43), Max: 37.3 (30 Oct 2017 20:00)  T(F): 98.4 (31 Oct 2017 13:43), Max: 99.1 (30 Oct 2017 20:00)  HR: 98 (31 Oct 2017 13:43) (77 - 102)  BP: 150/86 (31 Oct 2017 13:43) (125/65 - 159/79)  BP(mean): 112 (31 Oct 2017 08:00) (87 - 114)  RR: 17 (31 Oct 2017 13:43) (16 - 29)  SpO2: 96% (31 Oct 2017 13:43) (93% - 99%)    I&O's Detail:  30 Oct 2017 07:01  -  31 Oct 2017 07:00  --------------------------------------------------------  IN:    dextrose 5% + sodium chloride 0.45% with potassium chloride 20 mEq/L: 2300 mL    Oral Fluid: 100 mL    sodium chloride 0.9%: 150 mL    Solution: 100 mL    Solution: 1350 mL    Solution: 275 mL  Total IN: 4275 mL  OUT:    Colostomy: 50 mL    Incontinent per Condom Catheter: 2650 mL    Voided: 500 mL  Total OUT: 3200 mL  Total NET: 1075 mL    31 Oct 2017 07:01  -  31 Oct 2017 16:55  --------------------------------------------------------  IN:    dextrose 5% + sodium chloride 0.45% with potassium chloride 20 mEq/L: 200 mL    Solution: 50 mL  Total IN: 250 mL  OUT:    Colostomy: 100 mL    Incontinent per Condom Catheter: 750 mL  Total OUT: 850 mL  Total NET: -600 mL    LABS:                    11.0   12.2  )-----------( 135      ( 31 Oct 2017 02:30 )             31.3   10-31  141  |  102  |  7   ----------------------------<  123<H>  3.3<L>   |  26  |  0.65  Ca    8.2<L>      31 Oct 2017 02:30  Phos  4.0     10-31  Mg     2.6     10-31  PT/INR - ( 30 Oct 2017 02:41 )   PT: 12.7 sec;   INR: 1.17 ratio    PTT - ( 30 Oct 2017 02:41 )  PTT:35.4 sec    Physical Surgery:   gen: NAD, AAOx3  Abd: Soft, NT, ND   Clean/dry/intact   ostomy: patent with function

## 2017-10-31 NOTE — PROGRESS NOTE ADULT - SUBJECTIVE AND OBJECTIVE BOX
Chief Complaint:  Patient is a 82y old  Male who presents with a chief complaint of LBO, no BM x2wk (26 Oct 2017 16:16)      Interval Events: Patient transferred out of ICU. Today he had liquid feculent material output from ostomy. He denies abd pain, nausea or vomiting at this time. No fevers overnight.     Allergies:  No Known Allergies      Hospital Medications:  MEDICATIONS  (STANDING):  dextrose 5% + sodium chloride 0.45% with potassium chloride 20 mEq/L 1000 milliLiter(s) (100 mL/Hr) IV Continuous <Continuous>  enoxaparin Injectable 40 milliGRAM(s) SubCutaneous daily  insulin lispro (HumaLOG) corrective regimen sliding scale   SubCutaneous every 6 hours  levothyroxine 75 MICROGram(s) Oral daily  oxyCODONE    IR 5 milliGRAM(s) Oral every 6 hours  piperacillin/tazobactam IVPB. 3.375 Gram(s) IV Intermittent every 8 hours  simvastatin 40 milliGRAM(s) Oral at bedtime      PMHX/PSHX:  Hypothyroid  Hyperlipemia  No significant past surgical history      Family history:  No pertinent family history in first degree relatives      ROS:     General:  No wt loss, fevers, chills, night sweats, fatigue   Eyes:  Good vision, no reported pain  ENT:  No sore throat, pain, runny nose  CV:  No chest pain, palpitations  Resp:  No cough, wheezing, SOB  GI:  See HPI  :  No pain, bleeding, incontinence, nocturia  Muscle:  No pain, weakness  Neuro:  No weakness, tingling, memory problems  Psych:  (+) fatigue, no insomnia, mood problems, depression  Endocrine:  No cold/heat intolerance  Heme:  No petechiae, ecchymosis, easy bruising  Skin:  No rash, edema      PHYSICAL EXAM:   Vital Signs Last 24 Hrs  T(C): 36.6 (31 Oct 2017 10:00), Max: 37.3 (30 Oct 2017 20:00)  T(F): 97.8 (31 Oct 2017 10:00), Max: 99.1 (30 Oct 2017 20:00)  HR: 94 (31 Oct 2017 10:00) (77 - 102)  BP: 146/76 (31 Oct 2017 10:00) (125/65 - 159/79)  BP(mean): 112 (31 Oct 2017 08:00) (87 - 115)  RR: 18 (31 Oct 2017 10:00) (10 - 29)  SpO2: 95% (31 Oct 2017 10:00) (93% - 99%)    GENERAL:  NAD  HEENT:  sclera anicteric  CHEST:  no respiratory distress, CTAB  HEART:  RRR, no MRG, no edema  ABDOMEN:  R sided ostomy - appears healthy, appropriately tender, soft, non-distended   EXTREMITIES:  no cyanosis, no edema  SKIN:  No rash  NEURO:  Alert, oriented      LABS:                                     11.0   12.2  )-----------( 135      ( 31 Oct 2017 02:30 )             31.3     10-31    141  |  102  |  7   ----------------------------<  123<H>  3.3<L>   |  26  |  0.65    Ca    8.2<L>      31 Oct 2017 02:30  Phos  4.0     10-31  Mg     2.6     10-31        Imaging:

## 2017-10-31 NOTE — PROGRESS NOTE ADULT - ASSESSMENT
ASSESSMENT:  82y Male with peritoneal carcinomatosis, colonic perforation following sigmoidoscopy now s/p resection of transverse colon and proximal left colon with end colostomy.    PLAN:   - Postoperative pain control PRN  - NPO  - F/u pathology  - Synthroid increased yesterday; f/u TFTs  - DVT ppx with lovenox, OOBtC  - Encourage IS  - Zosyn for fecal peritonitis; vanco d/c'd  - Management per SICU team    Jus Villarreal, PGY-1  [facility] [phone]

## 2017-10-31 NOTE — PROGRESS NOTE ADULT - SUBJECTIVE AND OBJECTIVE BOX
Surgery Progress Note    S: Patient seen and examined. No acute events overnight. Pain well controlled with current regimen. Denies nausea/vomiting. Denies passing gas and bowel movements.     O:  Vital Signs Last 24 Hrs  T(C): 36.7 (31 Oct 2017 03:00), Max: 37.3 (30 Oct 2017 20:00)  T(F): 98.1 (31 Oct 2017 03:00), Max: 99.1 (30 Oct 2017 20:00)  HR: 85 (31 Oct 2017 05:00) (82 - 102)  BP: 143/74 (31 Oct 2017 05:00) (123/64 - 159/79)  BP(mean): 101 (31 Oct 2017 05:00) (87 - 115)  RR: 18 (31 Oct 2017 05:00) (9 - 28)  SpO2: 96% (31 Oct 2017 05:00) (91% - 99%)    I&O's Detail    29 Oct 2017 07:01  -  30 Oct 2017 07:00  --------------------------------------------------------  IN:    dexmedetomidine Infusion: 14.2 mL    norepinephrine Infusion: 94.7 mL    sodium chloride 0.9%: 1950 mL    Solution: 300 mL    Solution: 500 mL    Solution: 225 mL  Total IN: 3083.9 mL    OUT:    Colostomy: 15 mL    Indwelling Catheter - Urethral: 2410 mL    Nasoenteral Tube: 365 mL  Total OUT: 2790 mL    Total NET: 293.9 mL      30 Oct 2017 07:01  -  31 Oct 2017 05:19  --------------------------------------------------------  IN:    dextrose 5% + sodium chloride 0.45% with potassium chloride 20 mEq/L: 2200 mL    Oral Fluid: 100 mL    sodium chloride 0.9%: 150 mL    Solution: 100 mL    Solution: 1250 mL    Solution: 250 mL  Total IN: 4050 mL    OUT:    Incontinent per Condom Catheter: 2450 mL    Voided: 500 mL  Total OUT: 2950 mL    Total NET: 1100 mL          MEDICATIONS  (STANDING):  dextrose 5% + sodium chloride 0.45% with potassium chloride 20 mEq/L 1000 milliLiter(s) (100 mL/Hr) IV Continuous <Continuous>  enoxaparin Injectable 40 milliGRAM(s) SubCutaneous daily  insulin lispro (HumaLOG) corrective regimen sliding scale   SubCutaneous every 6 hours  levothyroxine Injectable 37.5 MICROGram(s) IV Push daily  piperacillin/tazobactam IVPB. 3.375 Gram(s) IV Intermittent every 8 hours  potassium chloride  10 mEq/100 mL IVPB 10 milliEquivalent(s) IV Intermittent every 1 hour  simvastatin 40 milliGRAM(s) Oral at bedtime    MEDICATIONS  (PRN):  HYDROmorphone  Injectable 0.5 milliGRAM(s) IV Push every 4 hours PRN Severe Pain (7 - 10)                            11.0   12.2  )-----------( 135      ( 31 Oct 2017 02:30 )             31.3       10-31    141  |  102  |  7   ----------------------------<  123<H>  3.3<L>   |  26  |  0.65    Ca    8.2<L>      31 Oct 2017 02:30  Phos  4.0     10-31  Mg     2.6     10-31        Physical Exam:  Gen: Laying in bed, NAD, alert and oriented.   Resp: Unlabored breathing  Abd: soft, NTND. Vertical midline incision c/d/i. Ostomy pink/viable with no gas/contents in bag. Surgery Progress Note    S: Patient seen and examined. No acute events overnight. Pain well controlled with current regimen. Denies nausea/vomiting. Denies passing gas and bowel movements.     O:  Vital Signs Last 24 Hrs  T(C): 36.7 (31 Oct 2017 03:00), Max: 37.3 (30 Oct 2017 20:00)  T(F): 98.1 (31 Oct 2017 03:00), Max: 99.1 (30 Oct 2017 20:00)  HR: 85 (31 Oct 2017 05:00) (82 - 102)  BP: 143/74 (31 Oct 2017 05:00) (123/64 - 159/79)  BP(mean): 101 (31 Oct 2017 05:00) (87 - 115)  RR: 18 (31 Oct 2017 05:00) (9 - 28)  SpO2: 96% (31 Oct 2017 05:00) (91% - 99%)    I&O's Detail    29 Oct 2017 07:01  -  30 Oct 2017 07:00  --------------------------------------------------------  IN:    dexmedetomidine Infusion: 14.2 mL    norepinephrine Infusion: 94.7 mL    sodium chloride 0.9%: 1950 mL    Solution: 300 mL    Solution: 500 mL    Solution: 225 mL  Total IN: 3083.9 mL    OUT:    Colostomy: 15 mL    Indwelling Catheter - Urethral: 2410 mL    Nasoenteral Tube: 365 mL  Total OUT: 2790 mL    Total NET: 293.9 mL      30 Oct 2017 07:01  -  31 Oct 2017 05:19  --------------------------------------------------------  IN:    dextrose 5% + sodium chloride 0.45% with potassium chloride 20 mEq/L: 2200 mL    Oral Fluid: 100 mL    sodium chloride 0.9%: 150 mL    Solution: 100 mL    Solution: 1250 mL    Solution: 250 mL  Total IN: 4050 mL    OUT:    Incontinent per Condom Catheter: 2450 mL    Voided: 500 mL  Total OUT: 2950 mL    Total NET: 1100 mL          MEDICATIONS  (STANDING):  dextrose 5% + sodium chloride 0.45% with potassium chloride 20 mEq/L 1000 milliLiter(s) (100 mL/Hr) IV Continuous <Continuous>  enoxaparin Injectable 40 milliGRAM(s) SubCutaneous daily  insulin lispro (HumaLOG) corrective regimen sliding scale   SubCutaneous every 6 hours  levothyroxine Injectable 37.5 MICROGram(s) IV Push daily  piperacillin/tazobactam IVPB. 3.375 Gram(s) IV Intermittent every 8 hours  potassium chloride  10 mEq/100 mL IVPB 10 milliEquivalent(s) IV Intermittent every 1 hour  simvastatin 40 milliGRAM(s) Oral at bedtime    MEDICATIONS  (PRN):  HYDROmorphone  Injectable 0.5 milliGRAM(s) IV Push every 4 hours PRN Severe Pain (7 - 10)                            11.0   12.2  )-----------( 135      ( 31 Oct 2017 02:30 )             31.3       10-31    141  |  102  |  7   ----------------------------<  123<H>  3.3<L>   |  26  |  0.65    Ca    8.2<L>      31 Oct 2017 02:30  Phos  4.0     10-31  Mg     2.6     10-31        Physical Exam:  Gen: Laying in bed, NAD, alert and oriented.   Resp: Unlabored breathing  Abd: soft, NTND. Vertical midline incision c/d/i. Ostomy pink/viable with gas/stool in bag.

## 2017-10-31 NOTE — PROGRESS NOTE ADULT - ASSESSMENT
Assessment:   82y Male who presents with Small bowel obstruction    Plan:  -DVT PPX  -Pain control   -OOB as tolerated with assistance   -clear liquid diet as tolerated   -continue to monitor [facility]   #[id number] 10-31-17 @ 16:55

## 2017-11-01 LAB
ANION GAP SERPL CALC-SCNC: 12 MMOL/L — SIGNIFICANT CHANGE UP (ref 5–17)
BUN SERPL-MCNC: 6 MG/DL — LOW (ref 7–23)
CA-I BLD-SCNC: 1.14 MMOL/L — SIGNIFICANT CHANGE UP (ref 1.12–1.3)
CALCIUM SERPL-MCNC: 8 MG/DL — LOW (ref 8.4–10.5)
CHLORIDE SERPL-SCNC: 102 MMOL/L — SIGNIFICANT CHANGE UP (ref 96–108)
CO2 SERPL-SCNC: 27 MMOL/L — SIGNIFICANT CHANGE UP (ref 22–31)
CREAT SERPL-MCNC: 0.54 MG/DL — SIGNIFICANT CHANGE UP (ref 0.5–1.3)
GLUCOSE SERPL-MCNC: 132 MG/DL — HIGH (ref 70–99)
HCT VFR BLD CALC: 30 % — LOW (ref 39–50)
HGB BLD-MCNC: 10 G/DL — LOW (ref 13–17)
MAGNESIUM SERPL-MCNC: 2 MG/DL — SIGNIFICANT CHANGE UP (ref 1.6–2.6)
MCHC RBC-ENTMCNC: 29.9 PG — SIGNIFICANT CHANGE UP (ref 27–34)
MCHC RBC-ENTMCNC: 33.3 GM/DL — SIGNIFICANT CHANGE UP (ref 32–36)
MCV RBC AUTO: 89.8 FL — SIGNIFICANT CHANGE UP (ref 80–100)
PHOSPHATE SERPL-MCNC: 2 MG/DL — LOW (ref 2.5–4.5)
PLATELET # BLD AUTO: 162 K/UL — SIGNIFICANT CHANGE UP (ref 150–400)
POTASSIUM SERPL-MCNC: 4 MMOL/L — SIGNIFICANT CHANGE UP (ref 3.5–5.3)
POTASSIUM SERPL-SCNC: 4 MMOL/L — SIGNIFICANT CHANGE UP (ref 3.5–5.3)
RBC # BLD: 3.34 M/UL — LOW (ref 4.2–5.8)
RBC # FLD: 13.8 % — SIGNIFICANT CHANGE UP (ref 10.3–14.5)
SODIUM SERPL-SCNC: 141 MMOL/L — SIGNIFICANT CHANGE UP (ref 135–145)
WBC # BLD: 12.2 K/UL — HIGH (ref 3.8–10.5)
WBC # FLD AUTO: 12.2 K/UL — HIGH (ref 3.8–10.5)

## 2017-11-01 RX ORDER — POTASSIUM PHOSPHATE, MONOBASIC POTASSIUM PHOSPHATE, DIBASIC 236; 224 MG/ML; MG/ML
15 INJECTION, SOLUTION INTRAVENOUS ONCE
Qty: 0 | Refills: 0 | Status: COMPLETED | OUTPATIENT
Start: 2017-11-01 | End: 2017-11-02

## 2017-11-01 RX ADMIN — Medication 75 MICROGRAM(S): at 05:11

## 2017-11-01 RX ADMIN — PIPERACILLIN AND TAZOBACTAM 25 GRAM(S): 4; .5 INJECTION, POWDER, LYOPHILIZED, FOR SOLUTION INTRAVENOUS at 13:45

## 2017-11-01 RX ADMIN — ENOXAPARIN SODIUM 40 MILLIGRAM(S): 100 INJECTION SUBCUTANEOUS at 13:38

## 2017-11-01 RX ADMIN — PIPERACILLIN AND TAZOBACTAM 25 GRAM(S): 4; .5 INJECTION, POWDER, LYOPHILIZED, FOR SOLUTION INTRAVENOUS at 05:11

## 2017-11-01 RX ADMIN — PIPERACILLIN AND TAZOBACTAM 25 GRAM(S): 4; .5 INJECTION, POWDER, LYOPHILIZED, FOR SOLUTION INTRAVENOUS at 21:05

## 2017-11-01 RX ADMIN — SIMVASTATIN 40 MILLIGRAM(S): 20 TABLET, FILM COATED ORAL at 21:05

## 2017-11-01 NOTE — PROGRESS NOTE ADULT - ASSESSMENT
Assessment:    82y Male who presents with Small bowel obstruction    Plan:  -DVT PPX- Lovenox  -Pain control   -OOB as tolerated with assistance   -clear liquid diet as tolerated  -monitor Gi Fxn   -Dispo Planning     Marian Peña   #[id number] 11-01-17 @ 08:34

## 2017-11-01 NOTE — PROGRESS NOTE ADULT - ASSESSMENT
81 yo man with pancreatic tail mass with peritoneal carcinomatosis s/p flex sig with stent placement c/b colonic perforation now s/p ex-lap with L hemicolectomy and diverting colostomy. Patient is doing better with return of bowel function.

## 2017-11-01 NOTE — PHYSICAL THERAPY INITIAL EVALUATION ADULT - CRITERIA FOR SKILLED THERAPEUTIC INTERVENTIONS
impairments found/functional limitations in following categories/predicted duration of therapy intervention/anticipated equipment needs at discharge/risk reduction/prevention/anticipated discharge recommendation/rehab potential/therapy frequency

## 2017-11-01 NOTE — PHYSICAL THERAPY INITIAL EVALUATION ADULT - PERTINENT HX OF CURRENT PROBLEM, REHAB EVAL
82M PHx HLD & hypothyroidism w/ abdominal pain x1 day, no BM x1week, found to have LBO at splenic flexure w/ evidence of pancreatic mass, peritoneal & lung nodules,. Plan for endoscopy from below by GI to attempt decompression & further evaluate possible mass at splenic flexure.

## 2017-11-01 NOTE — PHYSICAL THERAPY INITIAL EVALUATION ADULT - TRANSFER SAFETY CONCERNS NOTED: SIT/STAND, REHAB EVAL
decreased step length/stepping too close to front of assistive device/inability to maintain weight-bearing restrictions w/o assist/decreased weight-shifting ability

## 2017-11-01 NOTE — CHART NOTE - NSCHARTNOTEFT_GEN_A_CORE
Contacted by RN that family is bedside and want to discuss patients care. As per patient on bedside examination, despite wife being healthcare proxy, who is hard of hearing, he gives permission for medical team to discuss his current medical care with his daughter Leanne eden [phone]. Contacted by RN that family is bedside and want to discuss patients care. As per patient on bedside examination, despite wife being healthcare proxy, who is hard of hearing, he gives permission for medical team to discuss his current medical care with his daughter Leanne eden [phone].

## 2017-11-01 NOTE — PROGRESS NOTE ADULT - PROBLEM SELECTOR PLAN 1
- f/u pathology  - liquid diet as tolerated  - UOOB to chair  - ambulate as tolerated  - supportive care and abx per surgical team

## 2017-11-01 NOTE — PROGRESS NOTE ADULT - SUBJECTIVE AND OBJECTIVE BOX
GENERAL SURGERY DAILY PROGRESS NOTE:     Subjective: Patient seen and examined. Patient stable with no overnight events. Patient denies CP/ SOB/ Palpatations. Patient denies N/V. Reports flatus and ostomy out put in the bag.     MEDICATIONS  (STANDING):  dextrose 5% + sodium chloride 0.45% with potassium chloride 20 mEq/L 1000 milliLiter(s) (100 mL/Hr) IV Continuous <Continuous>  enoxaparin Injectable 40 milliGRAM(s) SubCutaneous daily  insulin lispro (HumaLOG) corrective regimen sliding scale   SubCutaneous Before meals and at bedtime  levothyroxine 75 MICROGram(s) Oral daily  oxyCODONE    IR 5 milliGRAM(s) Oral every 6 hours  piperacillin/tazobactam IVPB. 3.375 Gram(s) IV Intermittent every 8 hours  simvastatin 40 milliGRAM(s) Oral at bedtime    MEDICATIONS  (PRN):  acetaminophen   Tablet. 650 milliGRAM(s) Oral every 6 hours PRN Mild Pain (1 - 3)  HYDROmorphone  Injectable 0.5 milliGRAM(s) IV Push every 3 hours PRN Severe Breakthrough Pain  oxyCODONE    IR 10 milliGRAM(s) Oral every 6 hours PRN Severe Pain (7 - 10)    Vital Signs Last 24 Hrs  T(C): 37.2 (01 Nov 2017 05:51), Max: 37.2 (01 Nov 2017 05:51)  T(F): 98.9 (01 Nov 2017 05:51), Max: 98.9 (01 Nov 2017 05:51)  HR: 78 (01 Nov 2017 05:51) (78 - 98)  BP: 151/83 (01 Nov 2017 05:51) (138/88 - 151/83)  RR: 18 (01 Nov 2017 05:51) (17 - 18)  SpO2: 96% (01 Nov 2017 05:51) (95% - 97%)    I&O's Detail  31 Oct 2017 07:01  -  01 Nov 2017 07:00  --------------------------------------------------------  IN:    dextrose 5% + sodium chloride 0.45% with potassium chloride 20 mEq/L: 1400 mL    Solution: 250 mL  Total IN: 1650 mL  OUT:    Colostomy: 900 mL    Incontinent per Condom Catheter: 750 mL  Total OUT: 1650 mL  Total NET: 0 mL    01 Nov 2017 07:01  -  01 Nov 2017 08:34  --------------------------------------------------------  IN:  Total IN: 0 mL  OUT:    Colostomy: 200 mL  Total OUT: 200 mL  Total NET: -200 mL    LABS:                       11.0   12.2  )-----------( 135      ( 31 Oct 2017 02:30 )             31.3   10-31  141  |  102  |  7   ----------------------------<  123<H>  3.3<L>   |  26  |  0.65  Ca    8.2<L>      31 Oct 2017 02:30  Phos  4.0     10-31  Mg     2.6     10-31    Physical Exam:   Gen: NAD, AAOx3  Abd: soft, NT, ND  clean /dry/intact   ostomy patent with noted output

## 2017-11-01 NOTE — PROGRESS NOTE ADULT - ATTENDING COMMENTS
Events reviewed  Pt intubated  Post perforation, surgery as above  Continue post op supportive care
Patient seen and examined on AM SICU rounds with house staff. Patient s/p colectomy with end ileostomy.   Will continue with dilaudid PRN as patient's pain is tolerated.   Patient with hypokalemia and hypophophatemia - repleted and will recheck  On empiric antibiotics for colonic perforation - will complete 4-5 day course after source control. Patient still NPO. Will continue to monitor.   For his hypothyroidism -will continue synthroid.  - Resolving postoperative septic shock and respiratory failure  - Awaiting final pathology for large bowel obstruction - likely locally advanced pancreatic cancer  - OOB as tolerated
Post stent/perforation/ ex lap  Patient still intubated  Continue management per SICU staff
Patient seen and examined on AM SICU rounds with house staff  Off sedation patient is awake, alert.  Hemodynamics improved over the last 24 hours.  Has not received fluid boluses today, and is on VERY low dose levophed support  Spontaneous breathing trial done on rounds today, patient with good ABG and RSBI, extubated without immediate complications  Electrolytes / renal function ok  On empiric antibiotics for colonic perforation - will complete 4-5 day course after source control    - Resolving postoperative septic shock and respiratory failure  - Awaiting final pathology for large bowel obstruction - likely locally advanced pancreatic cancer  - OOB as tolerated  = 35 minutes direct critical care
As above.
As above.  Discussed with wife and family.
Agree with above.

## 2017-11-01 NOTE — PROGRESS NOTE ADULT - SUBJECTIVE AND OBJECTIVE BOX
Chief Complaint:  Patient is a 82y old  Male who presents with a chief complaint of LBO, no BM x2wk (26 Oct 2017 16:16)      Interval Events:  He continues to have liquid feculent material output from ostomy. He notes abd bloating. He denies abd pain, nausea or vomiting at this time. No fevers overnight.     Allergies:  No Known Allergies      Hospital Medications:  MEDICATIONS  (STANDING):  dextrose 5% + sodium chloride 0.45% with potassium chloride 20 mEq/L 1000 milliLiter(s) (30 mL/Hr) IV Continuous <Continuous>  enoxaparin Injectable 40 milliGRAM(s) SubCutaneous daily  insulin lispro (HumaLOG) corrective regimen sliding scale   SubCutaneous Before meals and at bedtime  levothyroxine 75 MICROGram(s) Oral daily  oxyCODONE    IR 5 milliGRAM(s) Oral every 6 hours  piperacillin/tazobactam IVPB. 3.375 Gram(s) IV Intermittent every 8 hours  potassium phosphate IVPB 15 milliMole(s) IV Intermittent once  simvastatin 40 milliGRAM(s) Oral at bedtime      PMHX/PSHX:  Hypothyroid  Hyperlipemia  No significant past surgical history      Family history:  No pertinent family history in first degree relatives      ROS:     General:  No wt loss, fevers, chills, night sweats, fatigue   Eyes:  Good vision, no reported pain  ENT:  No sore throat, pain, runny nose  CV:  No chest pain, palpitations  Resp:  No cough, wheezing, SOB  GI:  See HPI  :  No pain, bleeding, incontinence, nocturia  Muscle:  No pain, weakness  Neuro:  No weakness, tingling, memory problems  Psych:  (+) fatigue, no insomnia, mood problems, depression  Endocrine:  No cold/heat intolerance  Heme:  No petechiae, ecchymosis, easy bruising  Skin:  No rash, edema      PHYSICAL EXAM:   Vital Signs Last 24 Hrs  T(C): 36.7 (01 Nov 2017 14:12), Max: 37.2 (01 Nov 2017 05:51)  T(F): 98.1 (01 Nov 2017 14:12), Max: 98.9 (01 Nov 2017 05:51)  HR: 60 (01 Nov 2017 14:12) (60 - 88)  BP: 123/78 (01 Nov 2017 14:12) (123/78 - 151/83)  BP(mean): --  RR: 18 (01 Nov 2017 14:12) (18 - 18)  SpO2: 96% (01 Nov 2017 14:12) (95% - 97%)    GENERAL:  NAD  HEENT:  sclera anicteric  CHEST:  no respiratory distress, CTAB  HEART:  RRR, no MRG, no edema  ABDOMEN:  R sided ostomy - appears healthy, appropriately tender, soft, non-distended   EXTREMITIES:  no cyanosis, no edema  SKIN:  No rash  NEURO:  Alert, oriented      LABS:                                                10.0   12.20 )-----------( 162      ( 01 Nov 2017 08:42 )             30.0     11-01    141  |  102  |  6<L>  ----------------------------<  132<H>  4.0   |  27  |  0.54    Ca    8.0<L>      01 Nov 2017 08:41  Phos  2.0     11-01  Mg     2.0     11-01          Imaging:

## 2017-11-02 LAB
ANION GAP SERPL CALC-SCNC: 14 MMOL/L — SIGNIFICANT CHANGE UP (ref 5–17)
BUN SERPL-MCNC: 6 MG/DL — LOW (ref 7–23)
CALCIUM SERPL-MCNC: 7.6 MG/DL — LOW (ref 8.4–10.5)
CHLORIDE SERPL-SCNC: 101 MMOL/L — SIGNIFICANT CHANGE UP (ref 96–108)
CO2 SERPL-SCNC: 24 MMOL/L — SIGNIFICANT CHANGE UP (ref 22–31)
CREAT SERPL-MCNC: 0.55 MG/DL — SIGNIFICANT CHANGE UP (ref 0.5–1.3)
GLUCOSE SERPL-MCNC: 118 MG/DL — HIGH (ref 70–99)
HCT VFR BLD CALC: 29.1 % — LOW (ref 39–50)
HGB BLD-MCNC: 9.6 G/DL — LOW (ref 13–17)
MAGNESIUM SERPL-MCNC: 1.8 MG/DL — SIGNIFICANT CHANGE UP (ref 1.6–2.6)
MCHC RBC-ENTMCNC: 29.4 PG — SIGNIFICANT CHANGE UP (ref 27–34)
MCHC RBC-ENTMCNC: 33 GM/DL — SIGNIFICANT CHANGE UP (ref 32–36)
MCV RBC AUTO: 89 FL — SIGNIFICANT CHANGE UP (ref 80–100)
PHOSPHATE SERPL-MCNC: 3 MG/DL — SIGNIFICANT CHANGE UP (ref 2.5–4.5)
PLATELET # BLD AUTO: 170 K/UL — SIGNIFICANT CHANGE UP (ref 150–400)
POTASSIUM SERPL-MCNC: 3.6 MMOL/L — SIGNIFICANT CHANGE UP (ref 3.5–5.3)
POTASSIUM SERPL-SCNC: 3.6 MMOL/L — SIGNIFICANT CHANGE UP (ref 3.5–5.3)
RBC # BLD: 3.27 M/UL — LOW (ref 4.2–5.8)
RBC # FLD: 13.8 % — SIGNIFICANT CHANGE UP (ref 10.3–14.5)
SODIUM SERPL-SCNC: 139 MMOL/L — SIGNIFICANT CHANGE UP (ref 135–145)
WBC # BLD: 9.24 K/UL — SIGNIFICANT CHANGE UP (ref 3.8–10.5)
WBC # FLD AUTO: 9.24 K/UL — SIGNIFICANT CHANGE UP (ref 3.8–10.5)

## 2017-11-02 RX ORDER — POTASSIUM CHLORIDE 20 MEQ
10 PACKET (EA) ORAL
Qty: 0 | Refills: 0 | Status: COMPLETED | OUTPATIENT
Start: 2017-11-02 | End: 2017-11-02

## 2017-11-02 RX ORDER — OXYCODONE HYDROCHLORIDE 5 MG/1
5 TABLET ORAL EVERY 6 HOURS
Qty: 0 | Refills: 0 | Status: DISCONTINUED | OUTPATIENT
Start: 2017-11-02 | End: 2017-11-05

## 2017-11-02 RX ORDER — MAGNESIUM SULFATE 500 MG/ML
2 VIAL (ML) INJECTION ONCE
Qty: 0 | Refills: 0 | Status: COMPLETED | OUTPATIENT
Start: 2017-11-02 | End: 2017-11-02

## 2017-11-02 RX ADMIN — PIPERACILLIN AND TAZOBACTAM 25 GRAM(S): 4; .5 INJECTION, POWDER, LYOPHILIZED, FOR SOLUTION INTRAVENOUS at 05:26

## 2017-11-02 RX ADMIN — OXYCODONE HYDROCHLORIDE 5 MILLIGRAM(S): 5 TABLET ORAL at 12:28

## 2017-11-02 RX ADMIN — POTASSIUM PHOSPHATE, MONOBASIC POTASSIUM PHOSPHATE, DIBASIC 62.5 MILLIMOLE(S): 236; 224 INJECTION, SOLUTION INTRAVENOUS at 02:04

## 2017-11-02 RX ADMIN — PIPERACILLIN AND TAZOBACTAM 25 GRAM(S): 4; .5 INJECTION, POWDER, LYOPHILIZED, FOR SOLUTION INTRAVENOUS at 21:42

## 2017-11-02 RX ADMIN — SIMVASTATIN 40 MILLIGRAM(S): 20 TABLET, FILM COATED ORAL at 21:42

## 2017-11-02 RX ADMIN — OXYCODONE HYDROCHLORIDE 5 MILLIGRAM(S): 5 TABLET ORAL at 11:58

## 2017-11-02 RX ADMIN — Medication 50 GRAM(S): at 12:00

## 2017-11-02 RX ADMIN — PIPERACILLIN AND TAZOBACTAM 25 GRAM(S): 4; .5 INJECTION, POWDER, LYOPHILIZED, FOR SOLUTION INTRAVENOUS at 14:10

## 2017-11-02 RX ADMIN — ENOXAPARIN SODIUM 40 MILLIGRAM(S): 100 INJECTION SUBCUTANEOUS at 12:00

## 2017-11-02 RX ADMIN — Medication 100 MILLIEQUIVALENT(S): at 15:40

## 2017-11-02 RX ADMIN — Medication 100 MILLIEQUIVALENT(S): at 14:11

## 2017-11-02 RX ADMIN — Medication 75 MICROGRAM(S): at 05:25

## 2017-11-02 RX ADMIN — Medication 100 MILLIEQUIVALENT(S): at 12:00

## 2017-11-02 NOTE — PROGRESS NOTE ADULT - SUBJECTIVE AND OBJECTIVE BOX
Surgery Progress Note    S: Patient seen and examined. No acute events overnight. Pain well controlled with current regimen. Denies nausea/vomiting. Pt denies flatulence and bowel movement o/n, reports belching/indigestion      O:  Vital Signs Last 24 Hrs  T(C): 36.9 (02 Nov 2017 05:24), Max: 36.9 (02 Nov 2017 01:04)  T(F): 98.4 (02 Nov 2017 05:24), Max: 98.4 (02 Nov 2017 01:04)  HR: 74 (02 Nov 2017 05:24) (60 - 85)  BP: 136/72 (02 Nov 2017 05:24) (123/78 - 136/72)  BP(mean): --  RR: 18 (02 Nov 2017 05:24) (18 - 18)  SpO2: 96% (02 Nov 2017 05:24) (96% - 98%)    I&O's Detail    01 Nov 2017 07:01  -  02 Nov 2017 07:00  --------------------------------------------------------  IN:    dextrose 5% + sodium chloride 0.45% with potassium chloride 20 mEq/L: 1560 mL    Oral Fluid: 160 mL    Solution: 300 mL  Total IN: 2020 mL    OUT:    Colostomy: 900 mL    Voided: 1450 mL  Total OUT: 2350 mL    Total NET: -330 mL          MEDICATIONS  (STANDING):  dextrose 5% + sodium chloride 0.45% with potassium chloride 20 mEq/L 1000 milliLiter(s) (30 mL/Hr) IV Continuous <Continuous>  enoxaparin Injectable 40 milliGRAM(s) SubCutaneous daily  insulin lispro (HumaLOG) corrective regimen sliding scale   SubCutaneous Before meals and at bedtime  levothyroxine 75 MICROGram(s) Oral daily  oxyCODONE    IR 5 milliGRAM(s) Oral every 6 hours  piperacillin/tazobactam IVPB. 3.375 Gram(s) IV Intermittent every 8 hours  simvastatin 40 milliGRAM(s) Oral at bedtime    MEDICATIONS  (PRN):  acetaminophen   Tablet. 650 milliGRAM(s) Oral every 6 hours PRN Mild Pain (1 - 3)  HYDROmorphone  Injectable 0.5 milliGRAM(s) IV Push every 3 hours PRN Severe Breakthrough Pain  oxyCODONE    IR 10 milliGRAM(s) Oral every 6 hours PRN Severe Pain (7 - 10)                            10.0   12.20 )-----------( 162      ( 01 Nov 2017 08:42 )             30.0       11-01    141  |  102  |  6<L>  ----------------------------<  132<H>  4.0   |  27  |  0.54    Ca    8.0<L>      01 Nov 2017 08:41  Phos  2.0     11-01  Mg     2.0     11-01        Physical Exam:  Gen: Laying in bed, NAD, alert and oriented.   Resp: Unlabored breathing  Abd: soft, ND, minor tenderness, (+) stool in ostomy bag, wound hemostatic, staples in place, ilan in place Surgery Progress Note    S: Patient seen and examined. No acute events overnight. Pain well controlled with current regimen. Denies nausea/vomiting. Pt denies flatulence and bowel movement o/n, reports belching/indigestion      O:  Vital Signs Last 24 Hrs  T(C): 36.9 (02 Nov 2017 05:24), Max: 36.9 (02 Nov 2017 01:04)  T(F): 98.4 (02 Nov 2017 05:24), Max: 98.4 (02 Nov 2017 01:04)  HR: 74 (02 Nov 2017 05:24) (60 - 85)  BP: 136/72 (02 Nov 2017 05:24) (123/78 - 136/72)  BP(mean): --  RR: 18 (02 Nov 2017 05:24) (18 - 18)  SpO2: 96% (02 Nov 2017 05:24) (96% - 98%)    I&O's Detail    01 Nov 2017 07:01  -  02 Nov 2017 07:00  --------------------------------------------------------  IN:    dextrose 5% + sodium chloride 0.45% with potassium chloride 20 mEq/L: 1560 mL    Oral Fluid: 160 mL    Solution: 300 mL  Total IN: 2020 mL    OUT:    Colostomy: 900 mL    Voided: 1450 mL  Total OUT: 2350 mL    Total NET: -330 mL          MEDICATIONS  (STANDING):  dextrose 5% + sodium chloride 0.45% with potassium chloride 20 mEq/L 1000 milliLiter(s) (30 mL/Hr) IV Continuous <Continuous>  enoxaparin Injectable 40 milliGRAM(s) SubCutaneous daily  insulin lispro (HumaLOG) corrective regimen sliding scale   SubCutaneous Before meals and at bedtime  levothyroxine 75 MICROGram(s) Oral daily  oxyCODONE    IR 5 milliGRAM(s) Oral every 6 hours  piperacillin/tazobactam IVPB. 3.375 Gram(s) IV Intermittent every 8 hours  simvastatin 40 milliGRAM(s) Oral at bedtime    MEDICATIONS  (PRN):  acetaminophen   Tablet. 650 milliGRAM(s) Oral every 6 hours PRN Mild Pain (1 - 3)  HYDROmorphone  Injectable 0.5 milliGRAM(s) IV Push every 3 hours PRN Severe Breakthrough Pain  oxyCODONE    IR 10 milliGRAM(s) Oral every 6 hours PRN Severe Pain (7 - 10)                            10.0   12.20 )-----------( 162      ( 01 Nov 2017 08:42 )             30.0       11-01    141  |  102  |  6<L>  ----------------------------<  132<H>  4.0   |  27  |  0.54    Ca    8.0<L>      01 Nov 2017 08:41  Phos  2.0     11-01  Mg     2.0     11-01        Physical Exam:  Gen: Laying in bed, NAD, alert and oriented.   Resp: Unlabored breathing  Abd: soft, minimal distention, minor tenderness, (+) stool in ostomy bag, wound hemostatic, staples in place, ilan in place

## 2017-11-02 NOTE — PROGRESS NOTE ADULT - ASSESSMENT
83 yo man with pancreatic tail mass with peritoneal carcinomatosis s/p flex sig with stent placement c/b colonic perforation now s/p ex-lap with L hemicolectomy and diverting colostomy. Patient is doing better with return of bowel function. He continues to endorse bloating and distention.

## 2017-11-02 NOTE — PROGRESS NOTE ADULT - PROBLEM SELECTOR PLAN 1
- f/u pathology  - liquid diet as tolerated  - UOOB to chair  - ambulate as tolerated  - add simethicone PRN for bloating  - supportive care and abx per surgical team

## 2017-11-02 NOTE — PROGRESS NOTE ADULT - SUBJECTIVE AND OBJECTIVE BOX
Chief Complaint:  Patient is a 82y old  Male who presents with a chief complaint of LBO, no BM x2wk (26 Oct 2017 16:16)      Interval Events:  He continues to have liquid feculent material output from ostomy. He notes abd bloating. He denies abd pain, nausea or vomiting at this time. He is tolerating clear liquid diet. No fevers overnight. He is ambulating in the halls daily.    Allergies:  No Known Allergies    MEDICATIONS  (STANDING):  dextrose 5% + sodium chloride 0.45% with potassium chloride 20 mEq/L 1000 milliLiter(s) (50 mL/Hr) IV Continuous <Continuous>  enoxaparin Injectable 40 milliGRAM(s) SubCutaneous daily  levothyroxine 75 MICROGram(s) Oral daily  oxyCODONE    IR 5 milliGRAM(s) Oral every 6 hours  piperacillin/tazobactam IVPB. 3.375 Gram(s) IV Intermittent every 8 hours  simvastatin 40 milliGRAM(s) Oral at bedtime        PMHX/PSHX:  Hypothyroid  Hyperlipemia  No significant past surgical history      Family history:  No pertinent family history in first degree relatives      ROS:     General:  No wt loss, fevers, chills, night sweats, fatigue   Eyes:  Good vision, no reported pain  ENT:  No sore throat, pain, runny nose  CV:  No chest pain, palpitations  Resp:  No cough, wheezing, SOB  GI:  See HPI  :  No pain, bleeding, incontinence, nocturia  Muscle:  No pain, weakness  Neuro:  No weakness, tingling, memory problems  Psych:  (+) fatigue, no insomnia, mood problems, depression  Endocrine:  No cold/heat intolerance  Heme:  No petechiae, ecchymosis, easy bruising  Skin:  No rash, edema      PHYSICAL EXAM:   Vital Signs Last 24 Hrs  T(C): 36.5 (02 Nov 2017 09:26), Max: 36.9 (02 Nov 2017 01:04)  T(F): 97.7 (02 Nov 2017 09:26), Max: 98.4 (02 Nov 2017 01:04)  HR: 81 (02 Nov 2017 09:26) (60 - 81)  BP: 131/80 (02 Nov 2017 09:26) (123/78 - 136/72)  BP(mean): --  RR: 18 (02 Nov 2017 09:26) (18 - 18)  SpO2: 94% (02 Nov 2017 09:26) (94% - 98%)    GENERAL:  NAD  HEENT:  sclera anicteric  CHEST:  no respiratory distress, CTAB  HEART:  RRR, no MRG, no edema  ABDOMEN:  R sided ostomy - appears healthy, appropriately tender, soft, mildly-distended   EXTREMITIES:  no cyanosis, no edema  SKIN:  No rash  NEURO:  Alert, oriented      LABS:                                                 9.6    9.24  )-----------( 170      ( 02 Nov 2017 08:36 )             29.1     11-02    139  |  101  |  6<L>  ----------------------------<  118<H>  3.6   |  24  |  0.55    Ca    7.6<L>      02 Nov 2017 08:36  Phos  3.0     11-02  Mg     1.8     11-02        Imaging:

## 2017-11-03 ENCOUNTER — TRANSCRIPTION ENCOUNTER (OUTPATIENT)
Age: 82
End: 2017-11-03

## 2017-11-03 LAB
ANION GAP SERPL CALC-SCNC: 11 MMOL/L — SIGNIFICANT CHANGE UP (ref 5–17)
BUN SERPL-MCNC: 6 MG/DL — LOW (ref 7–23)
CALCIUM SERPL-MCNC: 8 MG/DL — LOW (ref 8.4–10.5)
CHLORIDE SERPL-SCNC: 102 MMOL/L — SIGNIFICANT CHANGE UP (ref 96–108)
CO2 SERPL-SCNC: 27 MMOL/L — SIGNIFICANT CHANGE UP (ref 22–31)
CREAT SERPL-MCNC: 0.55 MG/DL — SIGNIFICANT CHANGE UP (ref 0.5–1.3)
GLUCOSE SERPL-MCNC: 106 MG/DL — HIGH (ref 70–99)
HCT VFR BLD CALC: 28.8 % — LOW (ref 39–50)
HGB BLD-MCNC: 9.7 G/DL — LOW (ref 13–17)
MAGNESIUM SERPL-MCNC: 2.1 MG/DL — SIGNIFICANT CHANGE UP (ref 1.6–2.6)
MCHC RBC-ENTMCNC: 30.1 PG — SIGNIFICANT CHANGE UP (ref 27–34)
MCHC RBC-ENTMCNC: 33.7 GM/DL — SIGNIFICANT CHANGE UP (ref 32–36)
MCV RBC AUTO: 89.4 FL — SIGNIFICANT CHANGE UP (ref 80–100)
PHOSPHATE SERPL-MCNC: 2.8 MG/DL — SIGNIFICANT CHANGE UP (ref 2.5–4.5)
PLATELET # BLD AUTO: 178 K/UL — SIGNIFICANT CHANGE UP (ref 150–400)
POTASSIUM SERPL-MCNC: 3.9 MMOL/L — SIGNIFICANT CHANGE UP (ref 3.5–5.3)
POTASSIUM SERPL-SCNC: 3.9 MMOL/L — SIGNIFICANT CHANGE UP (ref 3.5–5.3)
RBC # BLD: 3.22 M/UL — LOW (ref 4.2–5.8)
RBC # FLD: 13.5 % — SIGNIFICANT CHANGE UP (ref 10.3–14.5)
SODIUM SERPL-SCNC: 140 MMOL/L — SIGNIFICANT CHANGE UP (ref 135–145)
WBC # BLD: 9.34 K/UL — SIGNIFICANT CHANGE UP (ref 3.8–10.5)
WBC # FLD AUTO: 9.34 K/UL — SIGNIFICANT CHANGE UP (ref 3.8–10.5)

## 2017-11-03 RX ADMIN — Medication 650 MILLIGRAM(S): at 01:00

## 2017-11-03 RX ADMIN — SIMVASTATIN 40 MILLIGRAM(S): 20 TABLET, FILM COATED ORAL at 21:05

## 2017-11-03 RX ADMIN — Medication 650 MILLIGRAM(S): at 15:43

## 2017-11-03 RX ADMIN — Medication 650 MILLIGRAM(S): at 11:05

## 2017-11-03 RX ADMIN — Medication 650 MILLIGRAM(S): at 23:13

## 2017-11-03 RX ADMIN — PIPERACILLIN AND TAZOBACTAM 25 GRAM(S): 4; .5 INJECTION, POWDER, LYOPHILIZED, FOR SOLUTION INTRAVENOUS at 05:47

## 2017-11-03 RX ADMIN — PIPERACILLIN AND TAZOBACTAM 25 GRAM(S): 4; .5 INJECTION, POWDER, LYOPHILIZED, FOR SOLUTION INTRAVENOUS at 13:43

## 2017-11-03 RX ADMIN — Medication 75 MICROGRAM(S): at 05:47

## 2017-11-03 RX ADMIN — Medication 650 MILLIGRAM(S): at 00:27

## 2017-11-03 RX ADMIN — Medication 650 MILLIGRAM(S): at 16:13

## 2017-11-03 RX ADMIN — Medication 650 MILLIGRAM(S): at 11:35

## 2017-11-03 RX ADMIN — Medication 650 MILLIGRAM(S): at 22:43

## 2017-11-03 RX ADMIN — ENOXAPARIN SODIUM 40 MILLIGRAM(S): 100 INJECTION SUBCUTANEOUS at 11:06

## 2017-11-03 NOTE — DISCHARGE NOTE ADULT - INSTRUCTIONS
Low fiber diet  Ostomy Care  Activity as tolerated. Avoid heavy lifting, no heavy exercise  or straining over 15 lbs for the next two weeks;  Driving- Please do not drive until your pain is well controlled and you do not need to take pain medications.  You may shower-Do not submerge or scrub incision sites.  Please pat dry incisions/dressings.  Leave the white steri strips in place, they will fall off on their own in approximately 5-7 days.

## 2017-11-03 NOTE — DISCHARGE NOTE ADULT - PLAN OF CARE
post operative pain control, tolerate diet, local surgical incision wound care Dr. Elizondo, Surgical Oncology within 7-10 days.  Call [phone] for appointment.  Notify your surgeon and return to ER for temperatures greater than 101, chills sweats, pain not controlled with pain medications, persistent nausea and vomiting, significant increase or decrease on ostomy output, or acutely concerning matters to you, that may require urgent medical attention.  Please keep incision sites clean and dry, shower only.  Do not bathe or immerse incision sites in water for a prolonged amount of time. follow up Please follow up with your Primary Care Physician regarding your hospitalization, and schedule an appointment within two weeks after your discharge to review your hospital course.  Please  review all your current medications, and dose adjust as prescribed by your Primary Care Physician.  Call their office for appointment. Dr. Elizondo, Surgical Oncology within 7-10 days.  Call [phone] for appointment.    Notify your surgeon and return to ER for temperatures greater than 101, chills sweats, pain not controlled with pain medications, persistent nausea and vomiting, significant increase or decrease on ostomy output, or acutely concerning matters to you, that may require urgent medical attention.    Please keep incision sites clean and dry, shower only.  Do not bathe or immerse incision sites in water for a prolonged amount of time.

## 2017-11-03 NOTE — DISCHARGE NOTE ADULT - PATIENT PORTAL LINK FT
“You can access the [employer] Patient Portal, offered by [facility], by registering with the following website: [email or web address]”

## 2017-11-03 NOTE — PROGRESS NOTE ADULT - SUBJECTIVE AND OBJECTIVE BOX
Surgery Progress Note    S: Patient seen and examined. No acute events overnight. Pain controlled with current regimen. Denies nausea/vomiting. Pt endorses continued bloating, no tolerating diet.     O:  Vital Signs Last 24 Hrs  T(C): 36.3 (03 Nov 2017 00:55), Max: 36.9 (02 Nov 2017 21:16)  T(F): 97.4 (03 Nov 2017 00:55), Max: 98.4 (02 Nov 2017 21:16)  HR: 70 (03 Nov 2017 00:55) (70 - 82)  BP: 136/75 (03 Nov 2017 00:55) (118/78 - 136/75)  BP(mean): --  RR: 18 (03 Nov 2017 00:55) (18 - 18)  SpO2: 98% (03 Nov 2017 00:55) (94% - 98%)    I&O's Detail    01 Nov 2017 07:01  -  02 Nov 2017 07:00  --------------------------------------------------------  IN:    dextrose 5% + sodium chloride 0.45% with potassium chloride 20 mEq/L: 1560 mL    Oral Fluid: 160 mL    Solution: 300 mL  Total IN: 2020 mL    OUT:    Colostomy: 900 mL    Voided: 1450 mL  Total OUT: 2350 mL    Total NET: -330 mL      02 Nov 2017 07:01  -  03 Nov 2017 05:28  --------------------------------------------------------  IN:    dextrose 5% + sodium chloride 0.45% with potassium chloride 20 mEq/L: 600 mL    Oral Fluid: 300 mL    Solution: 50 mL    Solution: 300 mL    Solution: 200 mL  Total IN: 1450 mL    OUT:    Colostomy: 725 mL    Voided: 1100 mL  Total OUT: 1825 mL    Total NET: -375 mL      MEDICATIONS  (STANDING):  dextrose 5% + sodium chloride 0.45% with potassium chloride 20 mEq/L 1000 milliLiter(s) (50 mL/Hr) IV Continuous <Continuous>  enoxaparin Injectable 40 milliGRAM(s) SubCutaneous daily  levothyroxine 75 MICROGram(s) Oral daily  piperacillin/tazobactam IVPB. 3.375 Gram(s) IV Intermittent every 8 hours  simvastatin 40 milliGRAM(s) Oral at bedtime    MEDICATIONS  (PRN):  acetaminophen   Tablet. 650 milliGRAM(s) Oral every 6 hours PRN Mild Pain (1 - 3)  HYDROmorphone  Injectable 0.5 milliGRAM(s) IV Push every 3 hours PRN Severe Breakthrough Pain  oxyCODONE    IR 5 milliGRAM(s) Oral every 6 hours PRN Moderate Pain (4 - 6)  oxyCODONE    IR 10 milliGRAM(s) Oral every 6 hours PRN Severe Pain (7 - 10)                            9.6    9.24  )-----------( 170      ( 02 Nov 2017 08:36 )             29.1       11-02    139  |  101  |  6<L>  ----------------------------<  118<H>  3.6   |  24  |  0.55    Ca    7.6<L>      02 Nov 2017 08:36  Phos  3.0     11-02  Mg     1.8     11-02        Physical Exam:  Gen: Laying in bed, NAD, alert and oriented.   Resp: Unlabored breathing  Abd: soft, minimal distention, minor tenderness, (+) stool in ostomy bag, wound hemostatic, staples in place

## 2017-11-03 NOTE — DISCHARGE NOTE ADULT - CARE PROVIDER_API CALL
Lamont Elizondo), Surgery; Surgical Oncology  [address] [place] [address]  Phone: [phone]  Fax: [phone]

## 2017-11-03 NOTE — DISCHARGE NOTE ADULT - MEDICATION SUMMARY - MEDICATIONS TO TAKE
I will START or STAY ON the medications listed below when I get home from the hospital:    acetaminophen 325 mg oral tablet  -- 2 tab(s) by mouth every 6 hours, As needed, Mild Pain (1 - 3)  -- Indication: For pain    oxyCODONE 10 mg oral tablet  -- 1-2 tab(s) by mouth every 6-8 hours, As needed, Moderate to Severe Pain. MDD:6  -- Indication: For pain    simvastatin  -- 40 milligram(s) by mouth once a day  -- Indication: For cholesterol    Synthroid  -- 75 microgram(s) by mouth once a day  -- Indication: For thyroid

## 2017-11-03 NOTE — PROGRESS NOTE ADULT - ASSESSMENT
Assessment:    ·	82y Male who presents with Small bowel obstruction s/p ex-lap resection of transverse & prox L colon w/ end colostomy     Plan:  -DVT PPX- Lovenox  -Cont pain control   -OOB as tolerated with assistance, encourage ambulation   -Cont CLD progress to LRD as tolerated, cont Ensure   -monitor Gi Fxn   -F/u pathology       Darvin Garza D.M.D Surgery Blue Team # [id number]

## 2017-11-03 NOTE — PROVIDER CONTACT NOTE (OTHER) - ACTION/TREATMENT ORDERED:
1 L NS bolus, flotrac, labs.
continue to monitor patient
MD notified and to bedside to assess patient. Instructed to call again if output increases or becomes darker in color, will continue to monitor for now.
Zofran 4mg IV push administered.

## 2017-11-03 NOTE — DISCHARGE NOTE ADULT - HOSPITAL COURSE
82M PHx HLD & hypothyroidism w/out PSHx presented to [facility] ED the morning of 10/26 w/ abdominal pain since the previous evening. Described the pain as short, in his lower abdomen, relieved w/ belching. Reports pain started after dinner, and that he has not had a normal/large BM in >1wk. Some small pellets of stool this am, but no flatus since day prior to admission. Denied N/V, CP, SOB, blood in stool or urine. Denied h/o constipation, and he has not taken anything for constipation. Of note he reports recent colonoscopy (~3mo ago w/ Jose Manuel [facility] & [facility] [phone], in [place]) that was notable for 2 polyps that were removed and came back benign.     In ED VS: T36.4, P86, /97, RR18, SpO2 100 on RA. Labs notable for WBC 8.2, Hct 39.4, Venous lactate 1.2, lipase 17. 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 Surgery was consulted for evaluation of LBO & concern for metastatic disease.    Pt was admitted for symptomatic relief, pain control, IV fluids and further evaluation and treatment.  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  GI consulted, who performed flex sigmoidoscopy which showed Diverticulosis in the sigmoid colon.  Severe stricture at 45 cm from the anal verge. A 25 mm x 6 cm WallFlex, colonic stent placed successfully. No specimens collected.  The patient was transferred to the PACU for observation after the completion of the procedure. The patient complained of abdominal pain and distention; portable upright CXR demonstrated free air, consistent with perforation.  Patient was taken emergently to the OR and a large serosal tear was found in the transverse colon. He underwent resection of transverse colon and proximal left colon, and end colostomy. Heavy tumor burden seen throughout small bowel and omentum.    Pt was observed in SICU for close monitorring of vital signs and I/Os., pain control, aggressive IV fluids.  In SICU pt remained intubated initially, and given fluid boluses for hypotension, metabolic acidosis and increasing lactate, noted hemodynamic compromise likely secondary to peritonitis and septic shock. Central line placed for levophed infusion.  POD#2, pt extubated and offf of pressor support.  He was hemodynamically transfer on POD #$.    On the floor,, pt. continued to mainly managed for postoperative pain, wound care, as well as close monitoring of fluid resuscitation and return of GI function, noted in ostomy.  Diet was advanced as tolerated as GI function returned.  Pt has been tolerating a diet, voiding, ambulating, and the pain is now well controlled.  Pt. is ready for discharge home in stable condition, and will follow up within one to two weeks as an outpatient with Dr. Elizondo. 82M PHx HLD & hypothyroidism w/out PSHx presented to [facility] ED the morning of 10/26 w/ abdominal pain since the previous evening. Described the pain as short, in his lower abdomen, relieved w/ belching. Reports pain started after dinner, and that he has not had a normal/large BM in >1wk. Some small pellets of stool this am, but no flatus since day prior to admission. Denied N/V, CP, SOB, blood in stool or urine. Denied h/o constipation, and he has not taken anything for constipation. Of note he reports recent colonoscopy (~3mo ago w/ Jose Manuel [facility] & [facility] [phone], in [place]) that was notable for 2 polyps that were removed and came back benign.     In ED VS: T36.4, P86, /97, RR18, SpO2 100 on RA. Labs notable for WBC 8.2, Hct 39.4, Venous lactate 1.2, lipase 17. 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 Surgery was consulted for evaluation of LBO & concern for metastatic disease.    Pt was admitted for symptomatic relief, pain control, IV fluids and further evaluation and treatment.  GI consulted, who performed flex sigmoidoscopy on 10/27 which showed Diverticulosis in the sigmoid colon.  Severe stricture at 45 cm from the anal verge. A 25 mm x 6 cm WallFlex, colonic stent placed successfully. No specimens collected.  The patient was transferred to the PACU for observation after the completion of the procedure. The patient complained of abdominal pain and distention; portable upright CXR demonstrated free air, consistent with perforation.  Patient was taken emergently to the OR and a large serosal tear was found in the transverse colon.  He underwent resection of transverse colon and proximal left colon, and end colostomy. Heavy tumor burden seen throughout small bowel and omentum.    Pt was observed in SICU for close monitorring of vital signs and I/Os., pain control, aggressive IV fluids.  In SICU pt remained intubated initially, and given fluid boluses for hypotension, metabolic acidosis and increasing lactate, noted hemodynamic compromise likely secondary to peritonitis and septic shock. Central line placed for levophed infusion.  POD#2, pt extubated and off of pressor support.  He was hemodynamically transfer on POD #3.    On the floor,, pt. continued to mainly managed for postoperative pain, wound care, as well as close monitoring of fluid resuscitation and return of GI function, noted in ostomy.  Diet was advanced as tolerated as GI function returned.  Pt has been tolerating a diet, voiding, ambulating, and the pain is now well controlled.  Pt. is ready for discharge home in stable condition, and will follow up within one to two weeks as an outpatient with Dr. Elizondo.

## 2017-11-03 NOTE — PROGRESS NOTE ADULT - SUBJECTIVE AND OBJECTIVE BOX
Chief Complaint:  Patient is a 82y old  Male who presents with a chief complaint of LBO, no BM x2wk (26 Oct 2017 16:16)      Interval Events:   - liquid output from ostomy  - abd bloating is significantly improved  - He denies abd pain, nausea or vomiting at this time  - He is tolerating clear liquid diet  - No fevers overnight  - He is ambulating in the halls regulalry    Allergies:  No Known Allergies    MEDICATIONS  (STANDING):  enoxaparin Injectable 40 milliGRAM(s) SubCutaneous daily  levothyroxine 75 MICROGram(s) Oral daily  piperacillin/tazobactam IVPB. 3.375 Gram(s) IV Intermittent every 8 hours  simvastatin 40 milliGRAM(s) Oral at bedtime        PMHX/PSHX:  Hypothyroid  Hyperlipemia  No significant past surgical history      Family history:  No pertinent family history in first degree relatives      ROS:     General:  No wt loss, fevers, chills, night sweats, fatigue   Eyes:  Good vision, no reported pain  ENT:  No sore throat, pain, runny nose  CV:  No chest pain, palpitations  Resp:  No cough, wheezing, SOB  GI:  See HPI  :  No pain, bleeding, incontinence, nocturia  Muscle:  No pain, weakness  Neuro:  No weakness, tingling, memory problems  Psych:  (+) fatigue, no insomnia, mood problems, depression  Endocrine:  No cold/heat intolerance  Heme:  No petechiae, ecchymosis, easy bruising  Skin:  No rash, edema      PHYSICAL EXAM:   Vital Signs Last 24 Hrs  T(C): 36.3 (03 Nov 2017 09:37), Max: 36.9 (02 Nov 2017 21:16)  T(F): 97.3 (03 Nov 2017 09:37), Max: 98.4 (02 Nov 2017 21:16)  HR: 70 (03 Nov 2017 09:37) (67 - 82)  BP: 113/70 (03 Nov 2017 09:37) (113/70 - 141/74)  BP(mean): --  RR: 17 (03 Nov 2017 09:37) (17 - 18)  SpO2: 96% (03 Nov 2017 09:37) (95% - 98%)    GENERAL:  NAD  HEENT:  sclera anicteric  CHEST:  no respiratory distress, CTAB  HEART:  RRR, no MRG, no edema  ABDOMEN:  R sided ostomy - appears healthy, liquid stool in ostomy bag, soft, NTND  EXTREMITIES:  no cyanosis, no edema  SKIN:  No rash  NEURO:  Alert, oriented      LABS:                                                            9.7    9.34  )-----------( 178      ( 03 Nov 2017 08:38 )             28.8     11-03    140  |  102  |  6<L>  ----------------------------<  106<H>  3.9   |  27  |  0.55    Ca    8.0<L>      03 Nov 2017 07:52  Phos  2.8     11-03  Mg     2.1     11-03      Imaging:  No new imaging

## 2017-11-03 NOTE — DISCHARGE NOTE ADULT - CARE PROVIDERS DIRECT ADDRESSES
[email or web address]

## 2017-11-03 NOTE — PROVIDER CONTACT NOTE (OTHER) - SITUATION
Pt's colostomy was emptied of brown/green liquid stool. After emptying the colostomy started putting out light serosanguinous fluid.

## 2017-11-03 NOTE — DISCHARGE NOTE ADULT - CARE PLAN
Principal Discharge DX:	Large bowel obstruction  Goal:	post operative pain control, tolerate diet, local surgical incision wound care  Instructions for follow-up, activity and diet:	Dr. Elizondo, Surgical Oncology within 7-10 days.  Call [phone] for appointment.  Notify your surgeon and return to ER for temperatures greater than 101, chills sweats, pain not controlled with pain medications, persistent nausea and vomiting, significant increase or decrease on ostomy output, or acutely concerning matters to you, that may require urgent medical attention.  Please keep incision sites clean and dry, shower only.  Do not bathe or immerse incision sites in water for a prolonged amount of time.  Secondary Diagnosis:	Hypothyroid  Goal:	follow up  Instructions for follow-up, activity and diet:	Please follow up with your Primary Care Physician regarding your hospitalization, and schedule an appointment within two weeks after your discharge to review your hospital course.  Please  review all your current medications, and dose adjust as prescribed by your Primary Care Physician.  Call their office for appointment.  Secondary Diagnosis:	Hyperlipemia  Goal:	follow up  Instructions for follow-up, activity and diet:	Please follow up with your Primary Care Physician regarding your hospitalization, and schedule an appointment within two weeks after your discharge to review your hospital course.  Please  review all your current medications, and dose adjust as prescribed by your Primary Care Physician.  Call their office for appointment. Principal Discharge DX:	Large bowel obstruction  Goal:	post operative pain control, tolerate diet, local surgical incision wound care  Instructions for follow-up, activity and diet:	Dr. Elizondo, Surgical Oncology within 7-10 days.  Call [phone] for appointment.    Notify your surgeon and return to ER for temperatures greater than 101, chills sweats, pain not controlled with pain medications, persistent nausea and vomiting, significant increase or decrease on ostomy output, or acutely concerning matters to you, that may require urgent medical attention.    Please keep incision sites clean and dry, shower only.  Do not bathe or immerse incision sites in water for a prolonged amount of time.  Secondary Diagnosis:	Hypothyroid  Goal:	follow up  Instructions for follow-up, activity and diet:	Please follow up with your Primary Care Physician regarding your hospitalization, and schedule an appointment within two weeks after your discharge to review your hospital course.  Please  review all your current medications, and dose adjust as prescribed by your Primary Care Physician.  Call their office for appointment.  Secondary Diagnosis:	Hyperlipemia  Goal:	follow up  Instructions for follow-up, activity and diet:	Please follow up with your Primary Care Physician regarding your hospitalization, and schedule an appointment within two weeks after your discharge to review your hospital course.  Please  review all your current medications, and dose adjust as prescribed by your Primary Care Physician.  Call their office for appointment.

## 2017-11-03 NOTE — PROGRESS NOTE ADULT - PROBLEM SELECTOR PLAN 1
- f/u pathology  - advance diet as tolerated  - UOOB to chair  - ambulate as tolerated  - simethicone PRN for bloating  - supportive care and abx per surgical team

## 2017-11-03 NOTE — PROGRESS NOTE ADULT - ASSESSMENT
81 yo man with pancreatic tail mass with peritoneal carcinomatosis s/p flex sig with stent placement c/b colonic perforation now s/p ex-lap with L hemicolectomy and diverting colostomy. Patient is doing better with return of bowel function. His bloating and distention are resolved at this time.

## 2017-11-04 RX ORDER — ACETAMINOPHEN 500 MG
2 TABLET ORAL
Qty: 0 | Refills: 0 | COMMUNITY
Start: 2017-11-04

## 2017-11-04 RX ORDER — OXYCODONE HYDROCHLORIDE 5 MG/1
1 TABLET ORAL
Qty: 20 | Refills: 0 | OUTPATIENT
Start: 2017-11-04 | End: 2017-11-09

## 2017-11-04 RX ADMIN — ENOXAPARIN SODIUM 40 MILLIGRAM(S): 100 INJECTION SUBCUTANEOUS at 11:23

## 2017-11-04 RX ADMIN — Medication 650 MILLIGRAM(S): at 21:55

## 2017-11-04 RX ADMIN — SIMVASTATIN 40 MILLIGRAM(S): 20 TABLET, FILM COATED ORAL at 21:24

## 2017-11-04 RX ADMIN — Medication 650 MILLIGRAM(S): at 09:10

## 2017-11-04 RX ADMIN — Medication 650 MILLIGRAM(S): at 21:25

## 2017-11-04 RX ADMIN — Medication 75 MICROGRAM(S): at 05:20

## 2017-11-04 NOTE — PROGRESS NOTE ADULT - SUBJECTIVE AND OBJECTIVE BOX
Surgery Progress Note    S: Patient seen and examined. No acute events overnight. Pain well controlled with current regimen. Denies nausea/vomiting. Endorses continued bloating, tolerating current diet. Reports ambulation.       O:  Vital Signs Last 24 Hrs  T(C): 36.8 (04 Nov 2017 05:20), Max: 37.2 (03 Nov 2017 20:59)  T(F): 98.2 (04 Nov 2017 05:20), Max: 98.9 (03 Nov 2017 20:59)  HR: 68 (04 Nov 2017 05:20) (65 - 89)  BP: 138/75 (04 Nov 2017 05:20) (113/70 - 138/78)  BP(mean): --  RR: 20 (04 Nov 2017 05:20) (17 - 20)  SpO2: 96% (04 Nov 2017 05:20) (95% - 97%)    I&O's Detail    02 Nov 2017 07:01  -  03 Nov 2017 07:00  --------------------------------------------------------  IN:    dextrose 5% + sodium chloride 0.45% with potassium chloride 20 mEq/L: 1200 mL    Oral Fluid: 300 mL    Solution: 50 mL    Solution: 300 mL    Solution: 300 mL  Total IN: 2150 mL    OUT:    Colostomy: 725 mL    Voided: 1100 mL  Total OUT: 1825 mL    Total NET: 325 mL      03 Nov 2017 07:01  -  04 Nov 2017 06:26  --------------------------------------------------------  IN:    Oral Fluid: 540 mL  Total IN: 540 mL    OUT:    Colostomy: 575 mL    Voided: 400 mL  Total OUT: 975 mL    Total NET: -435 mL          MEDICATIONS  (STANDING):  enoxaparin Injectable 40 milliGRAM(s) SubCutaneous daily  levothyroxine 75 MICROGram(s) Oral daily  simvastatin 40 milliGRAM(s) Oral at bedtime    MEDICATIONS  (PRN):  acetaminophen   Tablet. 650 milliGRAM(s) Oral every 6 hours PRN Mild Pain (1 - 3)  HYDROmorphone  Injectable 0.5 milliGRAM(s) IV Push every 3 hours PRN Severe Breakthrough Pain  oxyCODONE    IR 5 milliGRAM(s) Oral every 6 hours PRN Moderate Pain (4 - 6)  oxyCODONE    IR 10 milliGRAM(s) Oral every 6 hours PRN Severe Pain (7 - 10)                            9.7    9.34  )-----------( 178      ( 03 Nov 2017 08:38 )             28.8       11-03    140  |  102  |  6<L>  ----------------------------<  106<H>  3.9   |  27  |  0.55    Ca    8.0<L>      03 Nov 2017 07:52  Phos  2.8     11-03  Mg     2.1     11-03        Physical Exam:  Gen: Laying in bed, NAD, alert and oriented.   Resp: Unlabored breathing  Abd: soft, NT, mild distention, (+) stool in ostomy

## 2017-11-04 NOTE — PROGRESS NOTE ADULT - ASSESSMENT
Assessment:    ·	82y Male who presents with Small bowel obstruction s/p ex-lap resection of transverse & prox L colon w/ end colostomy     Plan:  -DVT PPX- Lovenox  -Cont pain control   -OOB as tolerated with assistance, encourage ambulation   -Cont LRD as tolerated, progress as tolerated cont Ensure   -monitor Gi Fxn   -F/u pathology   - Simethicone prn bloating             Darvin Garza D.M.D Surgery Blue Team # [id number] Assessment:    ·	82y Male who presents with Small bowel obstruction s/p ex-lap resection of transverse & prox L colon w/ end colostomy     Plan:  -DVT PPX- Lovenox  -Cont pain control   -OOB as tolerated with assistance, encourage ambulation   -Cont LRD as tolerated, progress as tolerated cont Ensure   -monitor Gi Fxn   -F/u pathology   -Simethicone prn bloating   -D/c  if tolerating PO diet           Darvin Garza D.M.D Surgery Blue Team # [id number]

## 2017-11-05 VITALS
HEART RATE: 64 BPM | RESPIRATION RATE: 18 BRPM | TEMPERATURE: 98 F | SYSTOLIC BLOOD PRESSURE: 129 MMHG | OXYGEN SATURATION: 97 % | DIASTOLIC BLOOD PRESSURE: 77 MMHG

## 2017-11-05 PROCEDURE — 85730 THROMBOPLASTIN TIME PARTIAL: CPT

## 2017-11-05 PROCEDURE — 85027 COMPLETE CBC AUTOMATED: CPT

## 2017-11-05 PROCEDURE — 86850 RBC ANTIBODY SCREEN: CPT

## 2017-11-05 PROCEDURE — C1769: CPT

## 2017-11-05 PROCEDURE — 85610 PROTHROMBIN TIME: CPT

## 2017-11-05 PROCEDURE — 71045 X-RAY EXAM CHEST 1 VIEW: CPT

## 2017-11-05 PROCEDURE — 84484 ASSAY OF TROPONIN QUANT: CPT

## 2017-11-05 PROCEDURE — 82962 GLUCOSE BLOOD TEST: CPT

## 2017-11-05 PROCEDURE — 83605 ASSAY OF LACTIC ACID: CPT

## 2017-11-05 PROCEDURE — 84295 ASSAY OF SERUM SODIUM: CPT

## 2017-11-05 PROCEDURE — 85014 HEMATOCRIT: CPT

## 2017-11-05 PROCEDURE — 84100 ASSAY OF PHOSPHORUS: CPT

## 2017-11-05 PROCEDURE — 82803 BLOOD GASES ANY COMBINATION: CPT

## 2017-11-05 PROCEDURE — 97530 THERAPEUTIC ACTIVITIES: CPT

## 2017-11-05 PROCEDURE — C1889: CPT

## 2017-11-05 PROCEDURE — 71260 CT THORAX DX C+: CPT

## 2017-11-05 PROCEDURE — 84132 ASSAY OF SERUM POTASSIUM: CPT

## 2017-11-05 PROCEDURE — 94002 VENT MGMT INPAT INIT DAY: CPT

## 2017-11-05 PROCEDURE — 94003 VENT MGMT INPAT SUBQ DAY: CPT

## 2017-11-05 PROCEDURE — 83735 ASSAY OF MAGNESIUM: CPT

## 2017-11-05 PROCEDURE — 88307 TISSUE EXAM BY PATHOLOGIST: CPT

## 2017-11-05 PROCEDURE — 82553 CREATINE MB FRACTION: CPT

## 2017-11-05 PROCEDURE — 83690 ASSAY OF LIPASE: CPT

## 2017-11-05 PROCEDURE — 83036 HEMOGLOBIN GLYCOSYLATED A1C: CPT

## 2017-11-05 PROCEDURE — 82330 ASSAY OF CALCIUM: CPT

## 2017-11-05 PROCEDURE — 97161 PT EVAL LOW COMPLEX 20 MIN: CPT

## 2017-11-05 PROCEDURE — C1876: CPT

## 2017-11-05 PROCEDURE — 80053 COMPREHEN METABOLIC PANEL: CPT

## 2017-11-05 PROCEDURE — 80048 BASIC METABOLIC PNL TOTAL CA: CPT

## 2017-11-05 PROCEDURE — 86900 BLOOD TYPING SEROLOGIC ABO: CPT

## 2017-11-05 PROCEDURE — 84145 PROCALCITONIN (PCT): CPT

## 2017-11-05 PROCEDURE — 82550 ASSAY OF CK (CPK): CPT

## 2017-11-05 PROCEDURE — 82947 ASSAY GLUCOSE BLOOD QUANT: CPT

## 2017-11-05 PROCEDURE — 88341 IMHCHEM/IMCYTCHM EA ADD ANTB: CPT

## 2017-11-05 PROCEDURE — 88342 IMHCHEM/IMCYTCHM 1ST ANTB: CPT

## 2017-11-05 PROCEDURE — 81001 URINALYSIS AUTO W/SCOPE: CPT

## 2017-11-05 PROCEDURE — 99285 EMERGENCY DEPT VISIT HI MDM: CPT | Mod: 25

## 2017-11-05 PROCEDURE — 96375 TX/PRO/DX INJ NEW DRUG ADDON: CPT

## 2017-11-05 PROCEDURE — 82435 ASSAY OF BLOOD CHLORIDE: CPT

## 2017-11-05 PROCEDURE — 82565 ASSAY OF CREATININE: CPT

## 2017-11-05 PROCEDURE — 88305 TISSUE EXAM BY PATHOLOGIST: CPT

## 2017-11-05 PROCEDURE — 96374 THER/PROPH/DIAG INJ IV PUSH: CPT | Mod: XU

## 2017-11-05 PROCEDURE — 74177 CT ABD & PELVIS W/CONTRAST: CPT

## 2017-11-05 PROCEDURE — 86901 BLOOD TYPING SEROLOGIC RH(D): CPT

## 2017-11-05 PROCEDURE — 82378 CARCINOEMBRYONIC ANTIGEN: CPT

## 2017-11-05 PROCEDURE — 86301 IMMUNOASSAY TUMOR CA 19-9: CPT

## 2017-11-05 RX ADMIN — Medication 75 MICROGRAM(S): at 05:15

## 2017-11-05 NOTE — PROGRESS NOTE ADULT - SUBJECTIVE AND OBJECTIVE BOX
Surgery Progress Note    S: Patient seen and examined. No acute events overnight. Pain well controlled with current regimen. Denies nausea/vomiting. Patient reports tolerating current diet. Reports ambulation. Pt wants to go home.   O:  Vital Signs Last 24 Hrs  T(C): 36.7 (05 Nov 2017 05:46), Max: 37.2 (04 Nov 2017 10:00)  T(F): 98.1 (05 Nov 2017 05:46), Max: 99 (04 Nov 2017 10:00)  HR: 64 (05 Nov 2017 05:46) (64 - 84)  BP: 129/77 (05 Nov 2017 05:46) (107/67 - 135/78)  BP(mean): --  RR: 18 (05 Nov 2017 05:46) (18 - 18)  SpO2: 97% (05 Nov 2017 05:46) (94% - 98%)    I&O's Detail    03 Nov 2017 07:01  -  04 Nov 2017 07:00  --------------------------------------------------------  IN:    Oral Fluid: 540 mL  Total IN: 540 mL    OUT:    Colostomy: 575 mL    Voided: 400 mL  Total OUT: 975 mL    Total NET: -435 mL      04 Nov 2017 08:01  -  05 Nov 2017 06:49  --------------------------------------------------------  IN:    Oral Fluid: 460 mL  Total IN: 460 mL    OUT:    Colostomy: 100 mL    Voided: 1200 mL  Total OUT: 1300 mL    Total NET: -840 mL          MEDICATIONS  (STANDING):  enoxaparin Injectable 40 milliGRAM(s) SubCutaneous daily  levothyroxine 75 MICROGram(s) Oral daily  simvastatin 40 milliGRAM(s) Oral at bedtime    MEDICATIONS  (PRN):  acetaminophen   Tablet. 650 milliGRAM(s) Oral every 6 hours PRN Mild Pain (1 - 3)  oxyCODONE    IR 5 milliGRAM(s) Oral every 6 hours PRN Moderate Pain (4 - 6)  oxyCODONE    IR 10 milliGRAM(s) Oral every 6 hours PRN Severe Pain (7 - 10)                            9.7    9.34  )-----------( 178      ( 03 Nov 2017 08:38 )             28.8       11-03    140  |  102  |  6<L>  ----------------------------<  106<H>  3.9   |  27  |  0.55    Ca    8.0<L>      03 Nov 2017 07:52  Phos  2.8     11-03  Mg     2.1     11-03        Physical Exam:  Gen: Laying in bed, NAD, alert and oriented.   Resp: Unlabored breathing  Abd: soft, NT, mild distention, (+) stool in ostomy

## 2017-11-05 NOTE — PROGRESS NOTE ADULT - ASSESSMENT
Assessment:    ·	82y Male who presents with Small bowel obstruction POB 9 s/p ex-lap resection of transverse & prox L colon w/ end colostomy     Plan:  -DVT PPX- Lovenox  -Cont pain control   -OOB as tolerated with assistance, encourage ambulation   -Cont LRD as tolerated, progress as tolerated cont Ensure   -monitor Gi Fxn   -F/u pathology   -Simethicone prn bloating   -D/c today w/ nursing care     Pt seen and examined with Dr. Emmanuel Garza D.M.D Surgery Blue Team # [id number]

## 2017-11-05 NOTE — PROGRESS NOTE ADULT - PROVIDER SPECIALTY LIST ADULT
Critical Care
Gastroenterology
SICU
SICU
Surgery
Gastroenterology
Surgery
Surgery

## 2017-11-06 ENCOUNTER — TRANSCRIPTION ENCOUNTER (OUTPATIENT)
Age: 82
End: 2017-11-06

## 2017-11-06 PROBLEM — E03.9 HYPOTHYROIDISM, UNSPECIFIED: Chronic | Status: ACTIVE | Noted: 2017-10-26

## 2017-11-06 PROBLEM — E78.5 HYPERLIPIDEMIA, UNSPECIFIED: Chronic | Status: ACTIVE | Noted: 2017-10-26

## 2017-11-07 ENCOUNTER — APPOINTMENT (OUTPATIENT)
Dept: SURGICAL ONCOLOGY | Facility: CLINIC | Age: 82
End: 2017-11-07
Payer: COMMERCIAL

## 2017-11-07 VITALS
TEMPERATURE: 97 F | WEIGHT: 122 LBS | HEIGHT: 63 IN | HEART RATE: 85 BPM | DIASTOLIC BLOOD PRESSURE: 79 MMHG | BODY MASS INDEX: 21.62 KG/M2 | RESPIRATION RATE: 16 BRPM | SYSTOLIC BLOOD PRESSURE: 118 MMHG

## 2017-11-07 DIAGNOSIS — N40.0 BENIGN PROSTATIC HYPERPLASIA WITHOUT LOWER URINARY TRACT SYMPMS: ICD-10-CM

## 2017-11-07 DIAGNOSIS — C80.1 SECONDARY MALIGNANT NEOPLASM OF RETROPERITONEUM AND PERITONEUM: ICD-10-CM

## 2017-11-07 DIAGNOSIS — Z86.69 PERSONAL HISTORY OF OTHER DISEASES OF THE NERVOUS SYSTEM AND SENSE ORGANS: ICD-10-CM

## 2017-11-07 DIAGNOSIS — Z78.9 OTHER SPECIFIED HEALTH STATUS: ICD-10-CM

## 2017-11-07 DIAGNOSIS — Z86.39 PERSONAL HISTORY OF OTHER ENDOCRINE, NUTRITIONAL AND METABOLIC DISEASE: ICD-10-CM

## 2017-11-07 DIAGNOSIS — K63.1 PERFORATION OF INTESTINE (NONTRAUMATIC): ICD-10-CM

## 2017-11-07 DIAGNOSIS — C78.6 SECONDARY MALIGNANT NEOPLASM OF RETROPERITONEUM AND PERITONEUM: ICD-10-CM

## 2017-11-07 PROCEDURE — 99024 POSTOP FOLLOW-UP VISIT: CPT

## 2017-11-08 ENCOUNTER — OUTPATIENT (OUTPATIENT)
Dept: OUTPATIENT SERVICES | Facility: HOSPITAL | Age: 82
LOS: 1 days | Discharge: HOME | End: 2017-11-08

## 2017-11-08 DIAGNOSIS — C18.9 MALIGNANT NEOPLASM OF COLON, UNSPECIFIED: ICD-10-CM

## 2017-11-10 ENCOUNTER — RESULT REVIEW (OUTPATIENT)
Age: 82
End: 2017-11-10

## 2017-11-10 ENCOUNTER — APPOINTMENT (OUTPATIENT)
Dept: HEMATOLOGY ONCOLOGY | Facility: CLINIC | Age: 82
End: 2017-11-10
Payer: COMMERCIAL

## 2017-11-10 VITALS
RESPIRATION RATE: 16 BRPM | SYSTOLIC BLOOD PRESSURE: 126 MMHG | HEIGHT: 63 IN | DIASTOLIC BLOOD PRESSURE: 84 MMHG | OXYGEN SATURATION: 99 % | BODY MASS INDEX: 20.9 KG/M2 | TEMPERATURE: 98.1 F | HEART RATE: 79 BPM | WEIGHT: 117.95 LBS

## 2017-11-10 DIAGNOSIS — Z80.1 FAMILY HISTORY OF MALIGNANT NEOPLASM OF TRACHEA, BRONCHUS AND LUNG: ICD-10-CM

## 2017-11-10 LAB
BASOPHILS # BLD AUTO: 0 K/UL — SIGNIFICANT CHANGE UP (ref 0–0.2)
BASOPHILS NFR BLD AUTO: 0.4 % — SIGNIFICANT CHANGE UP (ref 0–2)
EOSINOPHIL # BLD AUTO: 0.2 K/UL — SIGNIFICANT CHANGE UP (ref 0–0.5)
EOSINOPHIL NFR BLD AUTO: 1.4 % — SIGNIFICANT CHANGE UP (ref 0–6)
HCT VFR BLD CALC: 33.3 % — LOW (ref 39–50)
HGB BLD-MCNC: 11.3 G/DL — LOW (ref 13–17)
LYMPHOCYTES # BLD AUTO: 1 K/UL — SIGNIFICANT CHANGE UP (ref 1–3.3)
LYMPHOCYTES # BLD AUTO: 8.7 % — LOW (ref 13–44)
MCHC RBC-ENTMCNC: 31.4 PG — SIGNIFICANT CHANGE UP (ref 27–34)
MCHC RBC-ENTMCNC: 34.1 G/DL — SIGNIFICANT CHANGE UP (ref 32–36)
MCV RBC AUTO: 92.1 FL — SIGNIFICANT CHANGE UP (ref 80–100)
MONOCYTES # BLD AUTO: 0.6 K/UL — SIGNIFICANT CHANGE UP (ref 0–0.9)
MONOCYTES NFR BLD AUTO: 5.7 % — SIGNIFICANT CHANGE UP (ref 2–14)
NEUTROPHILS # BLD AUTO: 9.5 K/UL — HIGH (ref 1.8–7.4)
NEUTROPHILS NFR BLD AUTO: 83.8 % — HIGH (ref 43–77)
PLATELET # BLD AUTO: 490 K/UL — HIGH (ref 150–400)
RBC # BLD: 3.61 M/UL — LOW (ref 4.2–5.8)
RBC # FLD: 12.3 % — SIGNIFICANT CHANGE UP (ref 10.3–14.5)
WBC # BLD: 11.3 K/UL — HIGH (ref 3.8–10.5)
WBC # FLD AUTO: 11.3 K/UL — HIGH (ref 3.8–10.5)

## 2017-11-10 PROCEDURE — 99245 OFF/OP CONSLTJ NEW/EST HI 55: CPT

## 2017-11-10 RX ORDER — CYCLOSPORINE 0.5 MG/ML
0.05 EMULSION OPHTHALMIC
Qty: 6 | Refills: 0 | Status: DISCONTINUED | COMMUNITY
Start: 2017-10-12 | End: 2017-11-10

## 2017-11-17 LAB
ALBUMIN SERPL ELPH-MCNC: 3.4 G/DL
ALP BLD-CCNC: 106 U/L
ALT SERPL-CCNC: 18 U/L
ANION GAP SERPL CALC-SCNC: 13 MMOL/L
AST SERPL-CCNC: 16 U/L
BILIRUB SERPL-MCNC: 0.3 MG/DL
BUN SERPL-MCNC: 9 MG/DL
CALCIUM SERPL-MCNC: 8.9 MG/DL
CANCER AG19-9 SERPL-ACNC: <1 U/ML
CEA SERPL-MCNC: 1.7 NG/ML
CHLORIDE SERPL-SCNC: 99 MMOL/L
CO2 SERPL-SCNC: 27 MMOL/L
CREAT SERPL-MCNC: 0.6 MG/DL
GLUCOSE SERPL-MCNC: 110 MG/DL
HBV CORE IGG+IGM SER QL: NONREACTIVE
HBV SURFACE AB SER QL: REACTIVE
HBV SURFACE AG SER QL: NONREACTIVE
HCV AB SER QL: NONREACTIVE
HCV S/CO RATIO: 0.08 S/CO
POTASSIUM SERPL-SCNC: 4.8 MMOL/L
PROT SERPL-MCNC: 6.4 G/DL
SODIUM SERPL-SCNC: 139 MMOL/L

## 2017-11-28 ENCOUNTER — APPOINTMENT (OUTPATIENT)
Dept: HEMATOLOGY ONCOLOGY | Facility: CLINIC | Age: 82
End: 2017-11-28
Payer: COMMERCIAL

## 2017-11-28 VITALS
BODY MASS INDEX: 19.72 KG/M2 | OXYGEN SATURATION: 98 % | DIASTOLIC BLOOD PRESSURE: 71 MMHG | TEMPERATURE: 98.5 F | HEART RATE: 79 BPM | SYSTOLIC BLOOD PRESSURE: 109 MMHG | WEIGHT: 111.33 LBS | RESPIRATION RATE: 16 BRPM

## 2017-11-28 PROCEDURE — 99214 OFFICE O/P EST MOD 30 MIN: CPT

## 2017-11-28 RX ORDER — SIMVASTATIN 40 MG/1
40 TABLET, FILM COATED ORAL
Qty: 30 | Refills: 0 | Status: DISCONTINUED | COMMUNITY
Start: 2017-10-03 | End: 2017-11-28

## 2017-12-05 RX ORDER — ONDANSETRON 4 MG/1
4 TABLET ORAL
Qty: 30 | Refills: 3 | Status: DISCONTINUED | COMMUNITY
Start: 2017-11-28 | End: 2017-12-05

## 2017-12-05 RX ORDER — METOCLOPRAMIDE 10 MG/1
10 TABLET ORAL
Qty: 30 | Refills: 3 | Status: ACTIVE | COMMUNITY
Start: 2017-12-05 | End: 1900-01-01

## 2017-12-05 RX ORDER — DRONABINOL 5 MG/1
5 CAPSULE ORAL TWICE DAILY
Qty: 60 | Refills: 0 | Status: DISCONTINUED | COMMUNITY
Start: 2017-11-28 | End: 2017-12-05

## 2017-12-07 ENCOUNTER — RESULT REVIEW (OUTPATIENT)
Age: 82
End: 2017-12-07

## 2017-12-07 ENCOUNTER — APPOINTMENT (OUTPATIENT)
Age: 82
End: 2017-12-07

## 2017-12-07 LAB
BASOPHILS # BLD AUTO: 0 K/UL — SIGNIFICANT CHANGE UP (ref 0–0.2)
BASOPHILS NFR BLD AUTO: 0.4 % — SIGNIFICANT CHANGE UP (ref 0–2)
EOSINOPHIL # BLD AUTO: 0.2 K/UL — SIGNIFICANT CHANGE UP (ref 0–0.5)
EOSINOPHIL NFR BLD AUTO: 2.4 % — SIGNIFICANT CHANGE UP (ref 0–6)
HCT VFR BLD CALC: 33.6 % — LOW (ref 39–50)
HGB BLD-MCNC: 11.2 G/DL — LOW (ref 13–17)
LYMPHOCYTES # BLD AUTO: 1.3 K/UL — SIGNIFICANT CHANGE UP (ref 1–3.3)
LYMPHOCYTES # BLD AUTO: 15.4 % — SIGNIFICANT CHANGE UP (ref 13–44)
MCHC RBC-ENTMCNC: 30.4 PG — SIGNIFICANT CHANGE UP (ref 27–34)
MCHC RBC-ENTMCNC: 33.5 G/DL — SIGNIFICANT CHANGE UP (ref 32–36)
MCV RBC AUTO: 90.8 FL — SIGNIFICANT CHANGE UP (ref 80–100)
MONOCYTES # BLD AUTO: 0.4 K/UL — SIGNIFICANT CHANGE UP (ref 0–0.9)
MONOCYTES NFR BLD AUTO: 5.3 % — SIGNIFICANT CHANGE UP (ref 2–14)
NEUTROPHILS # BLD AUTO: 6.5 K/UL — SIGNIFICANT CHANGE UP (ref 1.8–7.4)
NEUTROPHILS NFR BLD AUTO: 76.5 % — SIGNIFICANT CHANGE UP (ref 43–77)
PLATELET # BLD AUTO: 273 K/UL — SIGNIFICANT CHANGE UP (ref 150–400)
RBC # BLD: 3.69 M/UL — LOW (ref 4.2–5.8)
RBC # FLD: 13.4 % — SIGNIFICANT CHANGE UP (ref 10.3–14.5)
WBC # BLD: 8.4 K/UL — SIGNIFICANT CHANGE UP (ref 3.8–10.5)
WBC # FLD AUTO: 8.4 K/UL — SIGNIFICANT CHANGE UP (ref 3.8–10.5)

## 2017-12-07 RX ORDER — SIMVASTATIN 20 MG/1
40 TABLET, FILM COATED ORAL
Qty: 0 | Refills: 0 | COMMUNITY

## 2017-12-08 DIAGNOSIS — Z51.11 ENCOUNTER FOR ANTINEOPLASTIC CHEMOTHERAPY: ICD-10-CM

## 2017-12-08 DIAGNOSIS — R11.2 NAUSEA WITH VOMITING, UNSPECIFIED: ICD-10-CM

## 2017-12-11 ENCOUNTER — OTHER (OUTPATIENT)
Age: 82
End: 2017-12-11

## 2017-12-13 ENCOUNTER — OUTPATIENT (OUTPATIENT)
Dept: OUTPATIENT SERVICES | Facility: HOSPITAL | Age: 82
LOS: 1 days | Discharge: HOME | End: 2017-12-13

## 2017-12-13 DIAGNOSIS — C18.9 MALIGNANT NEOPLASM OF COLON, UNSPECIFIED: ICD-10-CM

## 2017-12-18 DIAGNOSIS — C25.9 MALIGNANT NEOPLASM OF PANCREAS, UNSPECIFIED: ICD-10-CM

## 2017-12-19 ENCOUNTER — RESULT REVIEW (OUTPATIENT)
Age: 82
End: 2017-12-19

## 2017-12-19 ENCOUNTER — APPOINTMENT (OUTPATIENT)
Dept: HEMATOLOGY ONCOLOGY | Facility: CLINIC | Age: 82
End: 2017-12-19
Payer: COMMERCIAL

## 2017-12-19 ENCOUNTER — OUTPATIENT (OUTPATIENT)
Dept: OUTPATIENT SERVICES | Facility: HOSPITAL | Age: 82
LOS: 1 days | End: 2017-12-19
Payer: COMMERCIAL

## 2017-12-19 ENCOUNTER — LABORATORY RESULT (OUTPATIENT)
Age: 82
End: 2017-12-19

## 2017-12-19 VITALS
BODY MASS INDEX: 19.41 KG/M2 | OXYGEN SATURATION: 98 % | SYSTOLIC BLOOD PRESSURE: 122 MMHG | DIASTOLIC BLOOD PRESSURE: 78 MMHG | HEART RATE: 78 BPM | WEIGHT: 109.57 LBS | TEMPERATURE: 98 F | RESPIRATION RATE: 16 BRPM

## 2017-12-19 LAB
BASOPHILS # BLD AUTO: 0 K/UL — SIGNIFICANT CHANGE UP (ref 0–0.2)
BASOPHILS NFR BLD AUTO: 0.5 % — SIGNIFICANT CHANGE UP (ref 0–2)
EOSINOPHIL # BLD AUTO: 0.1 K/UL — SIGNIFICANT CHANGE UP (ref 0–0.5)
EOSINOPHIL NFR BLD AUTO: 1.2 % — SIGNIFICANT CHANGE UP (ref 0–6)
HCT VFR BLD CALC: 34 % — LOW (ref 39–50)
HGB BLD-MCNC: 11.4 G/DL — LOW (ref 13–17)
LYMPHOCYTES # BLD AUTO: 1.3 K/UL — SIGNIFICANT CHANGE UP (ref 1–3.3)
LYMPHOCYTES # BLD AUTO: 16.9 % — SIGNIFICANT CHANGE UP (ref 13–44)
MCHC RBC-ENTMCNC: 30.4 PG — SIGNIFICANT CHANGE UP (ref 27–34)
MCHC RBC-ENTMCNC: 33.5 G/DL — SIGNIFICANT CHANGE UP (ref 32–36)
MCV RBC AUTO: 90.7 FL — SIGNIFICANT CHANGE UP (ref 80–100)
MONOCYTES # BLD AUTO: 0.6 K/UL — SIGNIFICANT CHANGE UP (ref 0–0.9)
MONOCYTES NFR BLD AUTO: 7.6 % — SIGNIFICANT CHANGE UP (ref 2–14)
NEUTROPHILS # BLD AUTO: 5.8 K/UL — SIGNIFICANT CHANGE UP (ref 1.8–7.4)
NEUTROPHILS NFR BLD AUTO: 73.7 % — SIGNIFICANT CHANGE UP (ref 43–77)
PLATELET # BLD AUTO: 160 K/UL — SIGNIFICANT CHANGE UP (ref 150–400)
RBC # BLD: 3.75 M/UL — LOW (ref 4.2–5.8)
RBC # FLD: 14.5 % — SIGNIFICANT CHANGE UP (ref 10.3–14.5)
WBC # BLD: 7.9 K/UL — SIGNIFICANT CHANGE UP (ref 3.8–10.5)
WBC # FLD AUTO: 7.9 K/UL — SIGNIFICANT CHANGE UP (ref 3.8–10.5)

## 2017-12-19 PROCEDURE — 99214 OFFICE O/P EST MOD 30 MIN: CPT

## 2017-12-19 PROCEDURE — 81404 MOPATH PROCEDURE LEVEL 5: CPT

## 2017-12-19 PROCEDURE — 81121 IDH2 COMMON VARIANTS: CPT

## 2017-12-19 PROCEDURE — 81405 MOPATH PROCEDURE LEVEL 6: CPT

## 2017-12-19 PROCEDURE — 81120 IDH1 COMMON VARIANTS: CPT

## 2017-12-19 PROCEDURE — 81235 EGFR GENE COM VARIANTS: CPT

## 2017-12-19 PROCEDURE — 81276 KRAS GENE ADDL VARIANTS: CPT

## 2017-12-19 PROCEDURE — 81272 KIT GENE TARGETED SEQ ANALYS: CPT

## 2017-12-19 PROCEDURE — 81210 BRAF GENE: CPT

## 2017-12-19 PROCEDURE — 81314 PDGFRA GENE: CPT

## 2017-12-19 PROCEDURE — 81311 NRAS GENE VARIANTS EXON 2&3: CPT

## 2017-12-19 PROCEDURE — 81275 KRAS GENE VARIANTS EXON 2: CPT

## 2017-12-19 PROCEDURE — 81403 MOPATH PROCEDURE LEVEL 4: CPT

## 2017-12-21 ENCOUNTER — APPOINTMENT (OUTPATIENT)
Age: 82
End: 2017-12-21

## 2017-12-21 ENCOUNTER — RESULT REVIEW (OUTPATIENT)
Age: 82
End: 2017-12-21

## 2017-12-21 LAB
BASOPHILS # BLD AUTO: 0 K/UL — SIGNIFICANT CHANGE UP (ref 0–0.2)
BASOPHILS NFR BLD AUTO: 0.4 % — SIGNIFICANT CHANGE UP (ref 0–2)
EOSINOPHIL # BLD AUTO: 0.2 K/UL — SIGNIFICANT CHANGE UP (ref 0–0.5)
EOSINOPHIL NFR BLD AUTO: 2.2 % — SIGNIFICANT CHANGE UP (ref 0–6)
HCT VFR BLD CALC: 35.8 % — LOW (ref 39–50)
HGB BLD-MCNC: 12.1 G/DL — LOW (ref 13–17)
LYMPHOCYTES # BLD AUTO: 1.6 K/UL — SIGNIFICANT CHANGE UP (ref 1–3.3)
LYMPHOCYTES # BLD AUTO: 16.1 % — SIGNIFICANT CHANGE UP (ref 13–44)
MCHC RBC-ENTMCNC: 30.8 PG — SIGNIFICANT CHANGE UP (ref 27–34)
MCHC RBC-ENTMCNC: 33.7 G/DL — SIGNIFICANT CHANGE UP (ref 32–36)
MCV RBC AUTO: 91.4 FL — SIGNIFICANT CHANGE UP (ref 80–100)
MONOCYTES # BLD AUTO: 0.6 K/UL — SIGNIFICANT CHANGE UP (ref 0–0.9)
MONOCYTES NFR BLD AUTO: 5.7 % — SIGNIFICANT CHANGE UP (ref 2–14)
NEUTROPHILS # BLD AUTO: 7.7 K/UL — HIGH (ref 1.8–7.4)
NEUTROPHILS NFR BLD AUTO: 75.6 % — SIGNIFICANT CHANGE UP (ref 43–77)
PLATELET # BLD AUTO: 176 K/UL — SIGNIFICANT CHANGE UP (ref 150–400)
RBC # BLD: 3.91 M/UL — LOW (ref 4.2–5.8)
RBC # FLD: 14.6 % — HIGH (ref 10.3–14.5)
WBC # BLD: 10.1 K/UL — SIGNIFICANT CHANGE UP (ref 3.8–10.5)
WBC # FLD AUTO: 10.1 K/UL — SIGNIFICANT CHANGE UP (ref 3.8–10.5)

## 2017-12-22 ENCOUNTER — APPOINTMENT (OUTPATIENT)
Dept: GERIATRICS | Facility: CLINIC | Age: 82
End: 2017-12-22
Payer: COMMERCIAL

## 2017-12-22 VITALS
BODY MASS INDEX: 19.41 KG/M2 | HEART RATE: 87 BPM | TEMPERATURE: 99.1 F | DIASTOLIC BLOOD PRESSURE: 69 MMHG | OXYGEN SATURATION: 95 % | WEIGHT: 109.57 LBS | SYSTOLIC BLOOD PRESSURE: 100 MMHG | RESPIRATION RATE: 16 BRPM

## 2017-12-22 DIAGNOSIS — R11.2 NAUSEA WITH VOMITING, UNSPECIFIED: ICD-10-CM

## 2017-12-22 DIAGNOSIS — Z51.11 ENCOUNTER FOR ANTINEOPLASTIC CHEMOTHERAPY: ICD-10-CM

## 2017-12-22 PROCEDURE — 99205 OFFICE O/P NEW HI 60 MIN: CPT

## 2018-01-01 LAB
ALBUMIN SERPL ELPH-MCNC: 3.6 G/DL
ALP BLD-CCNC: 84 U/L
ALT SERPL-CCNC: 10 U/L
ANION GAP SERPL CALC-SCNC: 12 MMOL/L
AST SERPL-CCNC: 14 U/L
BILIRUB SERPL-MCNC: 0.4 MG/DL
BUN SERPL-MCNC: 19 MG/DL
CALCIUM SERPL-MCNC: 9.4 MG/DL
CHLORIDE SERPL-SCNC: 100 MMOL/L
CO2 SERPL-SCNC: 27 MMOL/L
CREAT SERPL-MCNC: 0.81 MG/DL
GLUCOSE SERPL-MCNC: 102 MG/DL
POTASSIUM SERPL-SCNC: 4.4 MMOL/L
PROT SERPL-MCNC: 6.1 G/DL
SODIUM SERPL-SCNC: 139 MMOL/L

## 2018-01-02 ENCOUNTER — RESULT REVIEW (OUTPATIENT)
Age: 83
End: 2018-01-02

## 2018-01-02 ENCOUNTER — APPOINTMENT (OUTPATIENT)
Age: 83
End: 2018-01-02
Payer: MEDICARE

## 2018-01-02 VITALS
HEART RATE: 88 BPM | TEMPERATURE: 98 F | SYSTOLIC BLOOD PRESSURE: 110 MMHG | BODY MASS INDEX: 19.72 KG/M2 | DIASTOLIC BLOOD PRESSURE: 72 MMHG | WEIGHT: 111.33 LBS | OXYGEN SATURATION: 95 % | RESPIRATION RATE: 16 BRPM

## 2018-01-02 LAB
BASOPHILS # BLD AUTO: 0 K/UL — SIGNIFICANT CHANGE UP (ref 0–0.2)
BASOPHILS NFR BLD AUTO: 0.5 % — SIGNIFICANT CHANGE UP (ref 0–2)
EOSINOPHIL # BLD AUTO: 0.1 K/UL — SIGNIFICANT CHANGE UP (ref 0–0.5)
EOSINOPHIL NFR BLD AUTO: 1.4 % — SIGNIFICANT CHANGE UP (ref 0–6)
HCT VFR BLD CALC: 33.7 % — LOW (ref 39–50)
HGB BLD-MCNC: 11.3 G/DL — LOW (ref 13–17)
LYMPHOCYTES # BLD AUTO: 0.9 K/UL — LOW (ref 1–3.3)
LYMPHOCYTES # BLD AUTO: 16.2 % — SIGNIFICANT CHANGE UP (ref 13–44)
MCHC RBC-ENTMCNC: 30.8 PG — SIGNIFICANT CHANGE UP (ref 27–34)
MCHC RBC-ENTMCNC: 33.6 G/DL — SIGNIFICANT CHANGE UP (ref 32–36)
MCV RBC AUTO: 91.8 FL — SIGNIFICANT CHANGE UP (ref 80–100)
MONOCYTES # BLD AUTO: 0.4 K/UL — SIGNIFICANT CHANGE UP (ref 0–0.9)
MONOCYTES NFR BLD AUTO: 7.9 % — SIGNIFICANT CHANGE UP (ref 2–14)
NEUTROPHILS # BLD AUTO: 4.2 K/UL — SIGNIFICANT CHANGE UP (ref 1.8–7.4)
NEUTROPHILS NFR BLD AUTO: 73.9 % — SIGNIFICANT CHANGE UP (ref 43–77)
PLATELET # BLD AUTO: 169 K/UL — SIGNIFICANT CHANGE UP (ref 150–400)
RBC # BLD: 3.67 M/UL — LOW (ref 4.2–5.8)
RBC # FLD: 15.3 % — HIGH (ref 10.3–14.5)
WBC # BLD: 5.7 K/UL — SIGNIFICANT CHANGE UP (ref 3.8–10.5)
WBC # FLD AUTO: 5.7 K/UL — SIGNIFICANT CHANGE UP (ref 3.8–10.5)

## 2018-01-02 PROCEDURE — 99213 OFFICE O/P EST LOW 20 MIN: CPT

## 2018-01-06 ENCOUNTER — APPOINTMENT (OUTPATIENT)
Age: 83
End: 2018-01-06

## 2018-01-06 ENCOUNTER — RESULT REVIEW (OUTPATIENT)
Age: 83
End: 2018-01-06

## 2018-01-06 LAB
BASOPHILS # BLD AUTO: 0 K/UL — SIGNIFICANT CHANGE UP (ref 0–0.2)
BASOPHILS NFR BLD AUTO: 0.5 % — SIGNIFICANT CHANGE UP (ref 0–2)
EOSINOPHIL # BLD AUTO: 0.1 K/UL — SIGNIFICANT CHANGE UP (ref 0–0.5)
EOSINOPHIL NFR BLD AUTO: 1.7 % — SIGNIFICANT CHANGE UP (ref 0–6)
HCT VFR BLD CALC: 33.3 % — LOW (ref 39–50)
HGB BLD-MCNC: 11.4 G/DL — LOW (ref 13–17)
LYMPHOCYTES # BLD AUTO: 1 K/UL — SIGNIFICANT CHANGE UP (ref 1–3.3)
LYMPHOCYTES # BLD AUTO: 15.9 % — SIGNIFICANT CHANGE UP (ref 13–44)
MCHC RBC-ENTMCNC: 31.3 PG — SIGNIFICANT CHANGE UP (ref 27–34)
MCHC RBC-ENTMCNC: 34.3 G/DL — SIGNIFICANT CHANGE UP (ref 32–36)
MCV RBC AUTO: 91.2 FL — SIGNIFICANT CHANGE UP (ref 80–100)
MONOCYTES # BLD AUTO: 0.5 K/UL — SIGNIFICANT CHANGE UP (ref 0–0.9)
MONOCYTES NFR BLD AUTO: 7.5 % — SIGNIFICANT CHANGE UP (ref 2–14)
NEUTROPHILS # BLD AUTO: 4.8 K/UL — SIGNIFICANT CHANGE UP (ref 1.8–7.4)
NEUTROPHILS NFR BLD AUTO: 74.4 % — SIGNIFICANT CHANGE UP (ref 43–77)
PLATELET # BLD AUTO: 214 K/UL — SIGNIFICANT CHANGE UP (ref 150–400)
RBC # BLD: 3.65 M/UL — LOW (ref 4.2–5.8)
RBC # FLD: 15.5 % — HIGH (ref 10.3–14.5)
WBC # BLD: 6.5 K/UL — SIGNIFICANT CHANGE UP (ref 3.8–10.5)
WBC # FLD AUTO: 6.5 K/UL — SIGNIFICANT CHANGE UP (ref 3.8–10.5)

## 2018-01-11 ENCOUNTER — OUTPATIENT (OUTPATIENT)
Dept: OUTPATIENT SERVICES | Facility: HOSPITAL | Age: 83
LOS: 1 days | Discharge: HOME | End: 2018-01-11

## 2018-01-11 ENCOUNTER — APPOINTMENT (OUTPATIENT)
Age: 83
End: 2018-01-11

## 2018-01-11 DIAGNOSIS — C25.9 MALIGNANT NEOPLASM OF PANCREAS, UNSPECIFIED: ICD-10-CM

## 2018-01-16 ENCOUNTER — APPOINTMENT (OUTPATIENT)
Dept: HEMATOLOGY ONCOLOGY | Facility: CLINIC | Age: 83
End: 2018-01-16
Payer: MEDICARE

## 2018-01-16 VITALS
HEART RATE: 90 BPM | TEMPERATURE: 98.2 F | OXYGEN SATURATION: 96 % | RESPIRATION RATE: 16 BRPM | SYSTOLIC BLOOD PRESSURE: 110 MMHG | WEIGHT: 112.44 LBS | DIASTOLIC BLOOD PRESSURE: 70 MMHG | BODY MASS INDEX: 19.92 KG/M2

## 2018-01-16 LAB
ALBUMIN SERPL ELPH-MCNC: 4 G/DL
ALP BLD-CCNC: 92 U/L
ALT SERPL-CCNC: 8 U/L
ANION GAP SERPL CALC-SCNC: 11 MMOL/L
AST SERPL-CCNC: 13 U/L
BILIRUB SERPL-MCNC: 0.3 MG/DL
BUN SERPL-MCNC: 13 MG/DL
CALCIUM SERPL-MCNC: 9.3 MG/DL
CANCER AG19-9 SERPL-ACNC: <1 U/ML
CEA SERPL-MCNC: 2.4 NG/ML
CHLORIDE SERPL-SCNC: 105 MMOL/L
CO2 SERPL-SCNC: 28 MMOL/L
CREAT SERPL-MCNC: 0.86 MG/DL
GLUCOSE SERPL-MCNC: 99 MG/DL
POTASSIUM SERPL-SCNC: 4.5 MMOL/L
PROT SERPL-MCNC: 6.2 G/DL
SODIUM SERPL-SCNC: 144 MMOL/L

## 2018-01-16 PROCEDURE — 99213 OFFICE O/P EST LOW 20 MIN: CPT

## 2018-01-16 RX ORDER — DEXAMETHASONE 4 MG/1
4 TABLET ORAL DAILY
Qty: 15 | Refills: 0 | Status: DISCONTINUED | COMMUNITY
Start: 2017-12-05 | End: 2018-01-16

## 2018-01-16 RX ORDER — DEXAMETHASONE 2 MG/1
2 TABLET ORAL DAILY
Qty: 5 | Refills: 0 | Status: DISCONTINUED | COMMUNITY
Start: 2017-12-19 | End: 2018-01-16

## 2018-01-18 ENCOUNTER — APPOINTMENT (OUTPATIENT)
Age: 83
End: 2018-01-18

## 2018-01-18 ENCOUNTER — RESULT REVIEW (OUTPATIENT)
Age: 83
End: 2018-01-18

## 2018-01-18 LAB
BASOPHILS # BLD AUTO: 0 K/UL — SIGNIFICANT CHANGE UP (ref 0–0.2)
BASOPHILS NFR BLD AUTO: 0.7 % — SIGNIFICANT CHANGE UP (ref 0–2)
EOSINOPHIL # BLD AUTO: 0.2 K/UL — SIGNIFICANT CHANGE UP (ref 0–0.5)
EOSINOPHIL NFR BLD AUTO: 2.3 % — SIGNIFICANT CHANGE UP (ref 0–6)
HCT VFR BLD CALC: 33.3 % — LOW (ref 39–50)
HGB BLD-MCNC: 11.2 G/DL — LOW (ref 13–17)
LYMPHOCYTES # BLD AUTO: 1.1 K/UL — SIGNIFICANT CHANGE UP (ref 1–3.3)
LYMPHOCYTES # BLD AUTO: 16.3 % — SIGNIFICANT CHANGE UP (ref 13–44)
MCHC RBC-ENTMCNC: 30.8 PG — SIGNIFICANT CHANGE UP (ref 27–34)
MCHC RBC-ENTMCNC: 33.7 G/DL — SIGNIFICANT CHANGE UP (ref 32–36)
MCV RBC AUTO: 91.4 FL — SIGNIFICANT CHANGE UP (ref 80–100)
MONOCYTES # BLD AUTO: 0.7 K/UL — SIGNIFICANT CHANGE UP (ref 0–0.9)
MONOCYTES NFR BLD AUTO: 10.6 % — SIGNIFICANT CHANGE UP (ref 2–14)
NEUTROPHILS # BLD AUTO: 4.8 K/UL — SIGNIFICANT CHANGE UP (ref 1.8–7.4)
NEUTROPHILS NFR BLD AUTO: 70.1 % — SIGNIFICANT CHANGE UP (ref 43–77)
PLATELET # BLD AUTO: 202 K/UL — SIGNIFICANT CHANGE UP (ref 150–400)
RBC # BLD: 3.64 M/UL — LOW (ref 4.2–5.8)
RBC # FLD: 16.1 % — HIGH (ref 10.3–14.5)
WBC # BLD: 6.8 K/UL — SIGNIFICANT CHANGE UP (ref 3.8–10.5)
WBC # FLD AUTO: 6.8 K/UL — SIGNIFICANT CHANGE UP (ref 3.8–10.5)

## 2018-01-19 DIAGNOSIS — R11.2 NAUSEA WITH VOMITING, UNSPECIFIED: ICD-10-CM

## 2018-01-19 DIAGNOSIS — Z51.11 ENCOUNTER FOR ANTINEOPLASTIC CHEMOTHERAPY: ICD-10-CM

## 2018-01-25 ENCOUNTER — APPOINTMENT (OUTPATIENT)
Age: 83
End: 2018-01-25

## 2018-01-31 ENCOUNTER — APPOINTMENT (OUTPATIENT)
Dept: HEMATOLOGY ONCOLOGY | Facility: CLINIC | Age: 83
End: 2018-01-31
Payer: MEDICARE

## 2018-01-31 VITALS
WEIGHT: 109.13 LBS | HEART RATE: 88 BPM | SYSTOLIC BLOOD PRESSURE: 110 MMHG | OXYGEN SATURATION: 99 % | BODY MASS INDEX: 19.33 KG/M2 | RESPIRATION RATE: 16 BRPM | DIASTOLIC BLOOD PRESSURE: 70 MMHG | TEMPERATURE: 98.3 F

## 2018-01-31 PROCEDURE — 99214 OFFICE O/P EST MOD 30 MIN: CPT

## 2018-02-01 ENCOUNTER — RESULT REVIEW (OUTPATIENT)
Age: 83
End: 2018-02-01

## 2018-02-01 ENCOUNTER — LABORATORY RESULT (OUTPATIENT)
Age: 83
End: 2018-02-01

## 2018-02-01 ENCOUNTER — APPOINTMENT (OUTPATIENT)
Age: 83
End: 2018-02-01

## 2018-02-01 LAB
BASOPHILS # BLD AUTO: 0.1 K/UL — SIGNIFICANT CHANGE UP (ref 0–0.2)
BASOPHILS NFR BLD AUTO: 0.6 % — SIGNIFICANT CHANGE UP (ref 0–2)
EOSINOPHIL # BLD AUTO: 0 K/UL — SIGNIFICANT CHANGE UP (ref 0–0.5)
EOSINOPHIL NFR BLD AUTO: 0 % — SIGNIFICANT CHANGE UP (ref 0–6)
HCT VFR BLD CALC: 33.1 % — LOW (ref 39–50)
HGB BLD-MCNC: 11.2 G/DL — LOW (ref 13–17)
LYMPHOCYTES # BLD AUTO: 1.1 K/UL — SIGNIFICANT CHANGE UP (ref 1–3.3)
LYMPHOCYTES # BLD AUTO: 11.3 % — LOW (ref 13–44)
MCHC RBC-ENTMCNC: 30.8 PG — SIGNIFICANT CHANGE UP (ref 27–34)
MCHC RBC-ENTMCNC: 34 G/DL — SIGNIFICANT CHANGE UP (ref 32–36)
MCV RBC AUTO: 90.7 FL — SIGNIFICANT CHANGE UP (ref 80–100)
MONOCYTES # BLD AUTO: 0.7 K/UL — SIGNIFICANT CHANGE UP (ref 0–0.9)
MONOCYTES NFR BLD AUTO: 7.3 % — SIGNIFICANT CHANGE UP (ref 2–14)
NEUTROPHILS # BLD AUTO: 8 K/UL — HIGH (ref 1.8–7.4)
NEUTROPHILS NFR BLD AUTO: 80.7 % — HIGH (ref 43–77)
PLATELET # BLD AUTO: 165 K/UL — SIGNIFICANT CHANGE UP (ref 150–400)
RBC # BLD: 3.65 M/UL — LOW (ref 4.2–5.8)
RBC # FLD: 16.7 % — HIGH (ref 10.3–14.5)
WBC # BLD: 9.9 K/UL — SIGNIFICANT CHANGE UP (ref 3.8–10.5)
WBC # FLD AUTO: 9.9 K/UL — SIGNIFICANT CHANGE UP (ref 3.8–10.5)

## 2018-02-03 ENCOUNTER — RX RENEWAL (OUTPATIENT)
Age: 83
End: 2018-02-03

## 2018-02-07 ENCOUNTER — FORM ENCOUNTER (OUTPATIENT)
Age: 83
End: 2018-02-07

## 2018-02-08 ENCOUNTER — OUTPATIENT (OUTPATIENT)
Dept: OUTPATIENT SERVICES | Facility: HOSPITAL | Age: 83
LOS: 1 days | End: 2018-02-08
Payer: MEDICARE

## 2018-02-08 ENCOUNTER — APPOINTMENT (OUTPATIENT)
Dept: CT IMAGING | Facility: IMAGING CENTER | Age: 83
End: 2018-02-08
Payer: MEDICARE

## 2018-02-08 DIAGNOSIS — C25.9 MALIGNANT NEOPLASM OF PANCREAS, UNSPECIFIED: ICD-10-CM

## 2018-02-08 PROCEDURE — 74177 CT ABD & PELVIS W/CONTRAST: CPT

## 2018-02-08 PROCEDURE — 74177 CT ABD & PELVIS W/CONTRAST: CPT | Mod: 26

## 2018-02-08 PROCEDURE — 71260 CT THORAX DX C+: CPT

## 2018-02-08 PROCEDURE — 71260 CT THORAX DX C+: CPT | Mod: 26

## 2018-02-09 ENCOUNTER — OUTPATIENT (OUTPATIENT)
Dept: OUTPATIENT SERVICES | Facility: HOSPITAL | Age: 83
LOS: 1 days | Discharge: HOME | End: 2018-02-09

## 2018-02-09 DIAGNOSIS — C25.9 MALIGNANT NEOPLASM OF PANCREAS, UNSPECIFIED: ICD-10-CM

## 2018-02-15 ENCOUNTER — LABORATORY RESULT (OUTPATIENT)
Age: 83
End: 2018-02-15

## 2018-02-15 ENCOUNTER — APPOINTMENT (OUTPATIENT)
Age: 83
End: 2018-02-15
Payer: MEDICARE

## 2018-02-15 ENCOUNTER — RESULT REVIEW (OUTPATIENT)
Age: 83
End: 2018-02-15

## 2018-02-15 ENCOUNTER — APPOINTMENT (OUTPATIENT)
Age: 83
End: 2018-02-15

## 2018-02-15 VITALS
OXYGEN SATURATION: 100 % | WEIGHT: 108.47 LBS | RESPIRATION RATE: 16 BRPM | DIASTOLIC BLOOD PRESSURE: 82 MMHG | SYSTOLIC BLOOD PRESSURE: 122 MMHG | HEART RATE: 88 BPM | TEMPERATURE: 98 F | BODY MASS INDEX: 19.21 KG/M2

## 2018-02-15 LAB
BASOPHILS # BLD AUTO: 0 K/UL — SIGNIFICANT CHANGE UP (ref 0–0.2)
BASOPHILS NFR BLD AUTO: 0.3 % — SIGNIFICANT CHANGE UP (ref 0–2)
EOSINOPHIL # BLD AUTO: 0.4 K/UL — SIGNIFICANT CHANGE UP (ref 0–0.5)
EOSINOPHIL NFR BLD AUTO: 3.8 % — SIGNIFICANT CHANGE UP (ref 0–6)
HCT VFR BLD CALC: 33.3 % — LOW (ref 39–50)
HGB BLD-MCNC: 11.8 G/DL — LOW (ref 13–17)
LYMPHOCYTES # BLD AUTO: 1.2 K/UL — SIGNIFICANT CHANGE UP (ref 1–3.3)
LYMPHOCYTES # BLD AUTO: 13.3 % — SIGNIFICANT CHANGE UP (ref 13–44)
MCHC RBC-ENTMCNC: 31.7 PG — SIGNIFICANT CHANGE UP (ref 27–34)
MCHC RBC-ENTMCNC: 35.4 G/DL — SIGNIFICANT CHANGE UP (ref 32–36)
MCV RBC AUTO: 89.5 FL — SIGNIFICANT CHANGE UP (ref 80–100)
MONOCYTES # BLD AUTO: 0.8 K/UL — SIGNIFICANT CHANGE UP (ref 0–0.9)
MONOCYTES NFR BLD AUTO: 8.4 % — SIGNIFICANT CHANGE UP (ref 2–14)
NEUTROPHILS # BLD AUTO: 6.8 K/UL — SIGNIFICANT CHANGE UP (ref 1.8–7.4)
NEUTROPHILS NFR BLD AUTO: 74.2 % — SIGNIFICANT CHANGE UP (ref 43–77)
PLATELET # BLD AUTO: 202 K/UL — SIGNIFICANT CHANGE UP (ref 150–400)
RBC # BLD: 3.72 M/UL — LOW (ref 4.2–5.8)
RBC # FLD: 16.7 % — HIGH (ref 10.3–14.5)
WBC # BLD: 9.1 K/UL — SIGNIFICANT CHANGE UP (ref 3.8–10.5)
WBC # FLD AUTO: 9.1 K/UL — SIGNIFICANT CHANGE UP (ref 3.8–10.5)

## 2018-02-15 PROCEDURE — 99214 OFFICE O/P EST MOD 30 MIN: CPT

## 2018-02-15 RX ORDER — MIRTAZAPINE 15 MG/1
15 TABLET, FILM COATED ORAL
Qty: 30 | Refills: 2 | Status: DISCONTINUED | COMMUNITY
Start: 2017-12-22 | End: 2018-02-15

## 2018-02-16 DIAGNOSIS — R11.2 NAUSEA WITH VOMITING, UNSPECIFIED: ICD-10-CM

## 2018-02-16 DIAGNOSIS — Z51.11 ENCOUNTER FOR ANTINEOPLASTIC CHEMOTHERAPY: ICD-10-CM

## 2018-03-01 ENCOUNTER — APPOINTMENT (OUTPATIENT)
Age: 83
End: 2018-03-01

## 2018-03-01 ENCOUNTER — RESULT REVIEW (OUTPATIENT)
Age: 83
End: 2018-03-01

## 2018-03-01 LAB
BASOPHILS # BLD AUTO: 0 K/UL — SIGNIFICANT CHANGE UP (ref 0–0.2)
BASOPHILS NFR BLD AUTO: 0.4 % — SIGNIFICANT CHANGE UP (ref 0–2)
EOSINOPHIL # BLD AUTO: 0 K/UL — SIGNIFICANT CHANGE UP (ref 0–0.5)
EOSINOPHIL NFR BLD AUTO: 0 % — SIGNIFICANT CHANGE UP (ref 0–6)
HCT VFR BLD CALC: 33.9 % — LOW (ref 39–50)
HGB BLD-MCNC: 11.8 G/DL — LOW (ref 13–17)
LYMPHOCYTES # BLD AUTO: 1.1 K/UL — SIGNIFICANT CHANGE UP (ref 1–3.3)
LYMPHOCYTES # BLD AUTO: 12.8 % — LOW (ref 13–44)
MCHC RBC-ENTMCNC: 31.8 PG — SIGNIFICANT CHANGE UP (ref 27–34)
MCHC RBC-ENTMCNC: 34.9 G/DL — SIGNIFICANT CHANGE UP (ref 32–36)
MCV RBC AUTO: 91.3 FL — SIGNIFICANT CHANGE UP (ref 80–100)
MONOCYTES # BLD AUTO: 0.8 K/UL — SIGNIFICANT CHANGE UP (ref 0–0.9)
MONOCYTES NFR BLD AUTO: 9.6 % — SIGNIFICANT CHANGE UP (ref 2–14)
NEUTROPHILS # BLD AUTO: 6.7 K/UL — SIGNIFICANT CHANGE UP (ref 1.8–7.4)
NEUTROPHILS NFR BLD AUTO: 77.2 % — HIGH (ref 43–77)
PLATELET # BLD AUTO: 186 K/UL — SIGNIFICANT CHANGE UP (ref 150–400)
RBC # BLD: 3.71 M/UL — LOW (ref 4.2–5.8)
RBC # FLD: 16.4 % — HIGH (ref 10.3–14.5)
WBC # BLD: 8.6 K/UL — SIGNIFICANT CHANGE UP (ref 3.8–10.5)
WBC # FLD AUTO: 8.6 K/UL — SIGNIFICANT CHANGE UP (ref 3.8–10.5)

## 2018-03-03 ENCOUNTER — RX RENEWAL (OUTPATIENT)
Age: 83
End: 2018-03-03

## 2018-03-03 RX ORDER — PANTOPRAZOLE 20 MG/1
20 TABLET, DELAYED RELEASE ORAL
Qty: 30 | Refills: 0 | Status: ACTIVE | COMMUNITY
Start: 2017-12-05 | End: 1900-01-01

## 2018-03-05 RX ORDER — OXYCODONE 10 MG/1
10 TABLET ORAL
Qty: 90 | Refills: 0 | Status: DISCONTINUED | COMMUNITY
Start: 2017-11-17 | End: 2018-03-05

## 2018-03-06 ENCOUNTER — APPOINTMENT (OUTPATIENT)
Age: 83
End: 2018-03-06
Payer: MEDICARE

## 2018-03-06 VITALS
BODY MASS INDEX: 18.55 KG/M2 | DIASTOLIC BLOOD PRESSURE: 70 MMHG | WEIGHT: 104.72 LBS | SYSTOLIC BLOOD PRESSURE: 108 MMHG | OXYGEN SATURATION: 100 % | TEMPERATURE: 98.1 F | HEART RATE: 72 BPM | RESPIRATION RATE: 16 BRPM

## 2018-03-06 DIAGNOSIS — Z00.00 ENCOUNTER FOR GENERAL ADULT MEDICAL EXAMINATION W/OUT ABNORMAL FINDINGS: ICD-10-CM

## 2018-03-06 PROCEDURE — 99214 OFFICE O/P EST MOD 30 MIN: CPT

## 2018-03-13 ENCOUNTER — OUTPATIENT (OUTPATIENT)
Dept: OUTPATIENT SERVICES | Facility: HOSPITAL | Age: 83
LOS: 1 days | Discharge: HOME | End: 2018-03-13

## 2018-03-13 DIAGNOSIS — C25.9 MALIGNANT NEOPLASM OF PANCREAS, UNSPECIFIED: ICD-10-CM

## 2018-03-15 ENCOUNTER — APPOINTMENT (OUTPATIENT)
Age: 83
End: 2018-03-15

## 2018-03-15 ENCOUNTER — LABORATORY RESULT (OUTPATIENT)
Age: 83
End: 2018-03-15

## 2018-03-15 ENCOUNTER — RESULT REVIEW (OUTPATIENT)
Age: 83
End: 2018-03-15

## 2018-03-15 LAB
ANISOCYTOSIS BLD QL: SLIGHT — SIGNIFICANT CHANGE UP
BASOPHILS # BLD AUTO: 0 K/UL — SIGNIFICANT CHANGE UP (ref 0–0.2)
EOSINOPHIL # BLD AUTO: 0 K/UL — SIGNIFICANT CHANGE UP (ref 0–0.5)
EOSINOPHIL NFR BLD AUTO: 3 % — SIGNIFICANT CHANGE UP (ref 0–6)
HCT VFR BLD CALC: 34.4 % — LOW (ref 39–50)
HGB BLD-MCNC: 11.6 G/DL — LOW (ref 13–17)
LYMPHOCYTES # BLD AUTO: 0.9 K/UL — LOW (ref 1–3.3)
LYMPHOCYTES # BLD AUTO: 12 % — LOW (ref 13–44)
MACROCYTES BLD QL: SLIGHT — SIGNIFICANT CHANGE UP
MCHC RBC-ENTMCNC: 30.9 PG — SIGNIFICANT CHANGE UP (ref 27–34)
MCHC RBC-ENTMCNC: 33.8 G/DL — SIGNIFICANT CHANGE UP (ref 32–36)
MCV RBC AUTO: 91.4 FL — SIGNIFICANT CHANGE UP (ref 80–100)
MONOCYTES # BLD AUTO: 0.6 K/UL — SIGNIFICANT CHANGE UP (ref 0–0.9)
MONOCYTES NFR BLD AUTO: 9 % — SIGNIFICANT CHANGE UP (ref 2–14)
NEUTROPHILS # BLD AUTO: 6.6 K/UL — SIGNIFICANT CHANGE UP (ref 1.8–7.4)
NEUTROPHILS NFR BLD AUTO: 76 % — SIGNIFICANT CHANGE UP (ref 43–77)
PLAT MORPH BLD: NORMAL — SIGNIFICANT CHANGE UP
PLATELET # BLD AUTO: 175 K/UL — SIGNIFICANT CHANGE UP (ref 150–400)
POIKILOCYTOSIS BLD QL AUTO: SLIGHT — SIGNIFICANT CHANGE UP
RBC # BLD: 3.76 M/UL — LOW (ref 4.2–5.8)
RBC # FLD: 15.7 % — HIGH (ref 10.3–14.5)
RBC BLD AUTO: SIGNIFICANT CHANGE UP
WBC # BLD: 8.2 K/UL — SIGNIFICANT CHANGE UP (ref 3.8–10.5)
WBC # FLD AUTO: 8.2 K/UL — SIGNIFICANT CHANGE UP (ref 3.8–10.5)

## 2018-03-16 DIAGNOSIS — Z51.11 ENCOUNTER FOR ANTINEOPLASTIC CHEMOTHERAPY: ICD-10-CM

## 2018-03-16 DIAGNOSIS — R11.2 NAUSEA WITH VOMITING, UNSPECIFIED: ICD-10-CM

## 2018-03-19 ENCOUNTER — APPOINTMENT (OUTPATIENT)
Dept: GERIATRICS | Facility: CLINIC | Age: 83
End: 2018-03-19
Payer: MEDICARE

## 2018-03-19 VITALS
HEART RATE: 83 BPM | SYSTOLIC BLOOD PRESSURE: 94 MMHG | WEIGHT: 104.72 LBS | RESPIRATION RATE: 16 BRPM | DIASTOLIC BLOOD PRESSURE: 66 MMHG | BODY MASS INDEX: 18.55 KG/M2 | OXYGEN SATURATION: 99 % | TEMPERATURE: 97.7 F

## 2018-03-19 PROCEDURE — 99215 OFFICE O/P EST HI 40 MIN: CPT

## 2018-03-19 RX ORDER — SERTRALINE 25 MG/1
25 TABLET, FILM COATED ORAL DAILY
Qty: 30 | Refills: 5 | Status: ACTIVE | COMMUNITY
Start: 2018-03-19 | End: 1900-01-01

## 2018-03-20 ENCOUNTER — APPOINTMENT (OUTPATIENT)
Age: 83
End: 2018-03-20

## 2018-03-22 ENCOUNTER — APPOINTMENT (OUTPATIENT)
Dept: HEMATOLOGY ONCOLOGY | Facility: CLINIC | Age: 83
End: 2018-03-22
Payer: MEDICARE

## 2018-03-22 VITALS
DIASTOLIC BLOOD PRESSURE: 82 MMHG | HEART RATE: 59 BPM | SYSTOLIC BLOOD PRESSURE: 123 MMHG | TEMPERATURE: 97.5 F | WEIGHT: 103.62 LBS | RESPIRATION RATE: 16 BRPM | BODY MASS INDEX: 18.36 KG/M2 | OXYGEN SATURATION: 100 %

## 2018-03-22 PROCEDURE — 99214 OFFICE O/P EST MOD 30 MIN: CPT

## 2018-03-29 ENCOUNTER — LABORATORY RESULT (OUTPATIENT)
Age: 83
End: 2018-03-29

## 2018-03-29 ENCOUNTER — RESULT REVIEW (OUTPATIENT)
Age: 83
End: 2018-03-29

## 2018-03-29 ENCOUNTER — APPOINTMENT (OUTPATIENT)
Age: 83
End: 2018-03-29

## 2018-03-29 DIAGNOSIS — K56.609 UNSPECIFIED INTESTINAL OBSTRUCTION, UNSPECIFIED AS TO PARTIAL VERSUS COMPLETE OBSTRUCTION: ICD-10-CM

## 2018-03-29 LAB
BASOPHILS # BLD AUTO: 0 K/UL — SIGNIFICANT CHANGE UP (ref 0–0.2)
BASOPHILS NFR BLD AUTO: 0.1 % — SIGNIFICANT CHANGE UP (ref 0–2)
EOSINOPHIL # BLD AUTO: 0.1 K/UL — SIGNIFICANT CHANGE UP (ref 0–0.5)
EOSINOPHIL NFR BLD AUTO: 2 % — SIGNIFICANT CHANGE UP (ref 0–6)
HCT VFR BLD CALC: 33.7 % — LOW (ref 39–50)
HGB BLD-MCNC: 11.7 G/DL — LOW (ref 13–17)
LYMPHOCYTES # BLD AUTO: 0.9 K/UL — LOW (ref 1–3.3)
LYMPHOCYTES # BLD AUTO: 13.7 % — SIGNIFICANT CHANGE UP (ref 13–44)
MCHC RBC-ENTMCNC: 31.2 PG — SIGNIFICANT CHANGE UP (ref 27–34)
MCHC RBC-ENTMCNC: 34.7 G/DL — SIGNIFICANT CHANGE UP (ref 32–36)
MCV RBC AUTO: 90 FL — SIGNIFICANT CHANGE UP (ref 80–100)
MONOCYTES # BLD AUTO: 0.5 K/UL — SIGNIFICANT CHANGE UP (ref 0–0.9)
MONOCYTES NFR BLD AUTO: 7.1 % — SIGNIFICANT CHANGE UP (ref 2–14)
NEUTROPHILS # BLD AUTO: 5.3 K/UL — SIGNIFICANT CHANGE UP (ref 1.8–7.4)
NEUTROPHILS NFR BLD AUTO: 77.2 % — HIGH (ref 43–77)
PLATELET # BLD AUTO: 164 K/UL — SIGNIFICANT CHANGE UP (ref 150–400)
RBC # BLD: 3.74 M/UL — LOW (ref 4.2–5.8)
RBC # FLD: 14.9 % — HIGH (ref 10.3–14.5)
WBC # BLD: 6.8 K/UL — SIGNIFICANT CHANGE UP (ref 3.8–10.5)
WBC # FLD AUTO: 6.8 K/UL — SIGNIFICANT CHANGE UP (ref 3.8–10.5)

## 2018-04-06 ENCOUNTER — APPOINTMENT (OUTPATIENT)
Age: 83
End: 2018-04-06
Payer: MEDICARE

## 2018-04-06 VITALS
WEIGHT: 102.51 LBS | BODY MASS INDEX: 18.16 KG/M2 | OXYGEN SATURATION: 98 % | DIASTOLIC BLOOD PRESSURE: 70 MMHG | HEART RATE: 64 BPM | RESPIRATION RATE: 16 BRPM | TEMPERATURE: 98.2 F | SYSTOLIC BLOOD PRESSURE: 112 MMHG

## 2018-04-06 PROCEDURE — 99213 OFFICE O/P EST LOW 20 MIN: CPT

## 2018-04-06 RX ORDER — PANCRELIPASE 120000; 24000; 76000 [USP'U]/1; [USP'U]/1; [USP'U]/1
24000-76000 CAPSULE, DELAYED RELEASE PELLETS ORAL
Qty: 160 | Refills: 3 | Status: ACTIVE | COMMUNITY
Start: 2017-12-13 | End: 1900-01-01

## 2018-04-12 ENCOUNTER — LABORATORY RESULT (OUTPATIENT)
Age: 83
End: 2018-04-12

## 2018-04-12 ENCOUNTER — RESULT REVIEW (OUTPATIENT)
Age: 83
End: 2018-04-12

## 2018-04-12 ENCOUNTER — MEDICATION RENEWAL (OUTPATIENT)
Age: 83
End: 2018-04-12

## 2018-04-12 ENCOUNTER — APPOINTMENT (OUTPATIENT)
Age: 83
End: 2018-04-12

## 2018-04-12 LAB
BASOPHILS # BLD AUTO: 0 K/UL — SIGNIFICANT CHANGE UP (ref 0–0.2)
BASOPHILS NFR BLD AUTO: 0.3 % — SIGNIFICANT CHANGE UP (ref 0–2)
EOSINOPHIL # BLD AUTO: 0.1 K/UL — SIGNIFICANT CHANGE UP (ref 0–0.5)
EOSINOPHIL NFR BLD AUTO: 1.4 % — SIGNIFICANT CHANGE UP (ref 0–6)
HCT VFR BLD CALC: 32.5 % — LOW (ref 39–50)
HGB BLD-MCNC: 11 G/DL — LOW (ref 13–17)
LYMPHOCYTES # BLD AUTO: 1.1 K/UL — SIGNIFICANT CHANGE UP (ref 1–3.3)
LYMPHOCYTES # BLD AUTO: 15.1 % — SIGNIFICANT CHANGE UP (ref 13–44)
MCHC RBC-ENTMCNC: 30.7 PG — SIGNIFICANT CHANGE UP (ref 27–34)
MCHC RBC-ENTMCNC: 33.9 G/DL — SIGNIFICANT CHANGE UP (ref 32–36)
MCV RBC AUTO: 90.4 FL — SIGNIFICANT CHANGE UP (ref 80–100)
MONOCYTES # BLD AUTO: 0.6 K/UL — SIGNIFICANT CHANGE UP (ref 0–0.9)
MONOCYTES NFR BLD AUTO: 8.2 % — SIGNIFICANT CHANGE UP (ref 2–14)
NEUTROPHILS # BLD AUTO: 5.4 K/UL — SIGNIFICANT CHANGE UP (ref 1.8–7.4)
NEUTROPHILS NFR BLD AUTO: 75.1 % — SIGNIFICANT CHANGE UP (ref 43–77)
PLATELET # BLD AUTO: 135 K/UL — LOW (ref 150–400)
RBC # BLD: 3.6 M/UL — LOW (ref 4.2–5.8)
RBC # FLD: 14.9 % — HIGH (ref 10.3–14.5)
WBC # BLD: 7.1 K/UL — SIGNIFICANT CHANGE UP (ref 3.8–10.5)
WBC # FLD AUTO: 7.1 K/UL — SIGNIFICANT CHANGE UP (ref 3.8–10.5)

## 2018-04-16 ENCOUNTER — APPOINTMENT (OUTPATIENT)
Dept: GERIATRICS | Facility: CLINIC | Age: 83
End: 2018-04-16
Payer: MEDICARE

## 2018-04-16 VITALS
RESPIRATION RATE: 16 BRPM | DIASTOLIC BLOOD PRESSURE: 70 MMHG | WEIGHT: 101.41 LBS | BODY MASS INDEX: 17.96 KG/M2 | SYSTOLIC BLOOD PRESSURE: 110 MMHG | TEMPERATURE: 96.7 F | HEART RATE: 68 BPM | OXYGEN SATURATION: 98 %

## 2018-04-16 PROCEDURE — 99215 OFFICE O/P EST HI 40 MIN: CPT

## 2018-04-17 ENCOUNTER — OUTPATIENT (OUTPATIENT)
Dept: OUTPATIENT SERVICES | Facility: HOSPITAL | Age: 83
LOS: 1 days | Discharge: HOME | End: 2018-04-17

## 2018-04-17 DIAGNOSIS — C25.9 MALIGNANT NEOPLASM OF PANCREAS, UNSPECIFIED: ICD-10-CM

## 2018-04-25 ENCOUNTER — APPOINTMENT (OUTPATIENT)
Dept: HEMATOLOGY ONCOLOGY | Facility: CLINIC | Age: 83
End: 2018-04-25
Payer: MEDICARE

## 2018-04-25 VITALS
OXYGEN SATURATION: 100 % | WEIGHT: 98.97 LBS | BODY MASS INDEX: 17.53 KG/M2 | TEMPERATURE: 97.4 F | DIASTOLIC BLOOD PRESSURE: 76 MMHG | HEART RATE: 62 BPM | RESPIRATION RATE: 16 BRPM | SYSTOLIC BLOOD PRESSURE: 117 MMHG

## 2018-04-25 DIAGNOSIS — K86.89 OTHER SPECIFIED DISEASES OF PANCREAS: ICD-10-CM

## 2018-04-25 PROCEDURE — 99214 OFFICE O/P EST MOD 30 MIN: CPT

## 2018-04-25 RX ORDER — METHYLPHENIDATE HYDROCHLORIDE 5 MG/1
5 TABLET ORAL
Qty: 30 | Refills: 0 | Status: ACTIVE | COMMUNITY
Start: 2018-03-06 | End: 1900-01-01

## 2018-04-25 RX ORDER — LORAZEPAM 0.5 MG/1
0.5 TABLET ORAL
Qty: 30 | Refills: 0 | Status: DISCONTINUED | COMMUNITY
Start: 2018-01-31 | End: 2018-04-25

## 2018-04-26 ENCOUNTER — RESULT REVIEW (OUTPATIENT)
Age: 83
End: 2018-04-26

## 2018-04-26 ENCOUNTER — LABORATORY RESULT (OUTPATIENT)
Age: 83
End: 2018-04-26

## 2018-04-26 ENCOUNTER — APPOINTMENT (OUTPATIENT)
Age: 83
End: 2018-04-26

## 2018-04-26 LAB
BASOPHILS # BLD AUTO: 0 K/UL — SIGNIFICANT CHANGE UP (ref 0–0.2)
BASOPHILS NFR BLD AUTO: 0.1 % — SIGNIFICANT CHANGE UP (ref 0–2)
EOSINOPHIL # BLD AUTO: 0 K/UL — SIGNIFICANT CHANGE UP (ref 0–0.5)
EOSINOPHIL NFR BLD AUTO: 0 % — SIGNIFICANT CHANGE UP (ref 0–6)
HCT VFR BLD CALC: 33.4 % — LOW (ref 39–50)
HGB BLD-MCNC: 11.8 G/DL — LOW (ref 13–17)
LYMPHOCYTES # BLD AUTO: 1.1 K/UL — SIGNIFICANT CHANGE UP (ref 1–3.3)
LYMPHOCYTES # BLD AUTO: 14.2 % — SIGNIFICANT CHANGE UP (ref 13–44)
MCHC RBC-ENTMCNC: 32 PG — SIGNIFICANT CHANGE UP (ref 27–34)
MCHC RBC-ENTMCNC: 35.4 G/DL — SIGNIFICANT CHANGE UP (ref 32–36)
MCV RBC AUTO: 90.5 FL — SIGNIFICANT CHANGE UP (ref 80–100)
MONOCYTES # BLD AUTO: 0.5 K/UL — SIGNIFICANT CHANGE UP (ref 0–0.9)
MONOCYTES NFR BLD AUTO: 6 % — SIGNIFICANT CHANGE UP (ref 2–14)
NEUTROPHILS # BLD AUTO: 6.2 K/UL — SIGNIFICANT CHANGE UP (ref 1.8–7.4)
NEUTROPHILS NFR BLD AUTO: 79.7 % — HIGH (ref 43–77)
PLATELET # BLD AUTO: 166 K/UL — SIGNIFICANT CHANGE UP (ref 150–400)
RBC # BLD: 3.69 M/UL — LOW (ref 4.2–5.8)
RBC # FLD: 14.9 % — HIGH (ref 10.3–14.5)
WBC # BLD: 7.8 K/UL — SIGNIFICANT CHANGE UP (ref 3.8–10.5)
WBC # FLD AUTO: 7.8 K/UL — SIGNIFICANT CHANGE UP (ref 3.8–10.5)

## 2018-04-27 DIAGNOSIS — R11.2 NAUSEA WITH VOMITING, UNSPECIFIED: ICD-10-CM

## 2018-04-27 DIAGNOSIS — Z51.11 ENCOUNTER FOR ANTINEOPLASTIC CHEMOTHERAPY: ICD-10-CM

## 2018-05-01 ENCOUNTER — FORM ENCOUNTER (OUTPATIENT)
Age: 83
End: 2018-05-01

## 2018-05-02 ENCOUNTER — APPOINTMENT (OUTPATIENT)
Dept: CT IMAGING | Facility: IMAGING CENTER | Age: 83
End: 2018-05-02
Payer: MEDICARE

## 2018-05-02 ENCOUNTER — OUTPATIENT (OUTPATIENT)
Dept: OUTPATIENT SERVICES | Facility: HOSPITAL | Age: 83
LOS: 1 days | End: 2018-05-02

## 2018-05-02 DIAGNOSIS — C25.9 MALIGNANT NEOPLASM OF PANCREAS, UNSPECIFIED: ICD-10-CM

## 2018-05-02 PROCEDURE — 74177 CT ABD & PELVIS W/CONTRAST: CPT | Mod: 26

## 2018-05-02 PROCEDURE — 71260 CT THORAX DX C+: CPT | Mod: 26

## 2018-05-03 ENCOUNTER — INPATIENT (INPATIENT)
Facility: HOSPITAL | Age: 83
LOS: 1 days | Discharge: HOME | DRG: 175 | End: 2018-05-05
Attending: INTERNAL MEDICINE | Admitting: STUDENT IN AN ORGANIZED HEALTH CARE EDUCATION/TRAINING PROGRAM
Payer: MEDICARE

## 2018-05-03 VITALS
RESPIRATION RATE: 16 BRPM | DIASTOLIC BLOOD PRESSURE: 79 MMHG | SYSTOLIC BLOOD PRESSURE: 139 MMHG | TEMPERATURE: 98 F | OXYGEN SATURATION: 98 % | HEART RATE: 74 BPM

## 2018-05-03 DIAGNOSIS — I26.99 OTHER PULMONARY EMBOLISM WITHOUT ACUTE COR PULMONALE: ICD-10-CM

## 2018-05-03 DIAGNOSIS — Z98.890 OTHER SPECIFIED POSTPROCEDURAL STATES: Chronic | ICD-10-CM

## 2018-05-03 DIAGNOSIS — Z90.89 ACQUIRED ABSENCE OF OTHER ORGANS: Chronic | ICD-10-CM

## 2018-05-03 DIAGNOSIS — Z90.49 ACQUIRED ABSENCE OF OTHER SPECIFIED PARTS OF DIGESTIVE TRACT: Chronic | ICD-10-CM

## 2018-05-03 PROBLEM — K86.89 PANCREATIC INSUFFICIENCY: Status: ACTIVE | Noted: 2017-12-13

## 2018-05-03 LAB
ALBUMIN SERPL ELPH-MCNC: 3.7 G/DL — SIGNIFICANT CHANGE UP (ref 3.3–5)
ALP SERPL-CCNC: 87 U/L — SIGNIFICANT CHANGE UP (ref 40–120)
ALT FLD-CCNC: 7 U/L — LOW (ref 10–45)
ANION GAP SERPL CALC-SCNC: 12 MMOL/L — SIGNIFICANT CHANGE UP (ref 5–17)
APTT BLD: 29.5 SEC — SIGNIFICANT CHANGE UP (ref 27.5–37.4)
AST SERPL-CCNC: 15 U/L — SIGNIFICANT CHANGE UP (ref 10–40)
BASOPHILS # BLD AUTO: 0 K/UL — SIGNIFICANT CHANGE UP (ref 0–0.2)
BASOPHILS NFR BLD AUTO: 0 % — SIGNIFICANT CHANGE UP (ref 0–2)
BILIRUB SERPL-MCNC: 0.4 MG/DL — SIGNIFICANT CHANGE UP (ref 0.2–1.2)
BUN SERPL-MCNC: 10 MG/DL — SIGNIFICANT CHANGE UP (ref 7–23)
CALCIUM SERPL-MCNC: 9.7 MG/DL — SIGNIFICANT CHANGE UP (ref 8.4–10.5)
CHLORIDE SERPL-SCNC: 100 MMOL/L — SIGNIFICANT CHANGE UP (ref 96–108)
CO2 SERPL-SCNC: 26 MMOL/L — SIGNIFICANT CHANGE UP (ref 22–31)
CREAT SERPL-MCNC: 0.8 MG/DL — SIGNIFICANT CHANGE UP (ref 0.5–1.3)
EOSINOPHIL # BLD AUTO: 0 K/UL — SIGNIFICANT CHANGE UP (ref 0–0.5)
EOSINOPHIL NFR BLD AUTO: 0.6 % — SIGNIFICANT CHANGE UP (ref 0–6)
GLUCOSE SERPL-MCNC: 104 MG/DL — HIGH (ref 70–99)
HCT VFR BLD CALC: 34.3 % — LOW (ref 39–50)
HGB BLD-MCNC: 11.7 G/DL — LOW (ref 13–17)
INR BLD: 1.07 RATIO — SIGNIFICANT CHANGE UP (ref 0.88–1.16)
LYMPHOCYTES # BLD AUTO: 1 K/UL — SIGNIFICANT CHANGE UP (ref 1–3.3)
LYMPHOCYTES # BLD AUTO: 24.5 % — SIGNIFICANT CHANGE UP (ref 13–44)
MCHC RBC-ENTMCNC: 31.4 PG — SIGNIFICANT CHANGE UP (ref 27–34)
MCHC RBC-ENTMCNC: 34 GM/DL — SIGNIFICANT CHANGE UP (ref 32–36)
MCV RBC AUTO: 92.3 FL — SIGNIFICANT CHANGE UP (ref 80–100)
MONOCYTES # BLD AUTO: 0.3 K/UL — SIGNIFICANT CHANGE UP (ref 0–0.9)
MONOCYTES NFR BLD AUTO: 6.9 % — SIGNIFICANT CHANGE UP (ref 2–14)
NEUTROPHILS # BLD AUTO: 2.9 K/UL — SIGNIFICANT CHANGE UP (ref 1.8–7.4)
NEUTROPHILS NFR BLD AUTO: 68 % — SIGNIFICANT CHANGE UP (ref 43–77)
NT-PROBNP SERPL-SCNC: 200 PG/ML — SIGNIFICANT CHANGE UP (ref 0–300)
PLATELET # BLD AUTO: 210 K/UL — SIGNIFICANT CHANGE UP (ref 150–400)
POTASSIUM SERPL-MCNC: 3.9 MMOL/L — SIGNIFICANT CHANGE UP (ref 3.5–5.3)
POTASSIUM SERPL-SCNC: 3.9 MMOL/L — SIGNIFICANT CHANGE UP (ref 3.5–5.3)
PROT SERPL-MCNC: 6.3 G/DL — SIGNIFICANT CHANGE UP (ref 6–8.3)
PROTHROM AB SERPL-ACNC: 11.6 SEC — SIGNIFICANT CHANGE UP (ref 9.8–12.7)
RBC # BLD: 3.71 M/UL — LOW (ref 4.2–5.8)
RBC # FLD: 14.7 % — HIGH (ref 10.3–14.5)
SODIUM SERPL-SCNC: 138 MMOL/L — SIGNIFICANT CHANGE UP (ref 135–145)
TROPONIN T SERPL-MCNC: <0.01 NG/ML — SIGNIFICANT CHANGE UP (ref 0–0.06)
WBC # BLD: 4.2 K/UL — SIGNIFICANT CHANGE UP (ref 3.8–10.5)
WBC # FLD AUTO: 4.2 K/UL — SIGNIFICANT CHANGE UP (ref 3.8–10.5)

## 2018-05-03 PROCEDURE — 93010 ELECTROCARDIOGRAM REPORT: CPT

## 2018-05-03 PROCEDURE — 99223 1ST HOSP IP/OBS HIGH 75: CPT

## 2018-05-03 PROCEDURE — 99285 EMERGENCY DEPT VISIT HI MDM: CPT | Mod: 25,GC

## 2018-05-03 RX ORDER — ENOXAPARIN SODIUM 100 MG/ML
40 INJECTION SUBCUTANEOUS ONCE
Qty: 0 | Refills: 0 | Status: COMPLETED | OUTPATIENT
Start: 2018-05-03 | End: 2018-05-03

## 2018-05-03 RX ORDER — OXYCODONE HYDROCHLORIDE 5 MG/1
1 TABLET ORAL
Qty: 0 | Refills: 0 | COMMUNITY

## 2018-05-03 NOTE — H&P ADULT - PROBLEM SELECTOR PLAN 2
Primary team to f/u Heme/Onc   Primary team to contact Palliative team: per patient utilize medical marijuana outpatient  Confirm dose of Creon and resume once reconciled

## 2018-05-03 NOTE — H&P ADULT - HISTORY OF PRESENT ILLNESS
82 yo man with PMH of hypothyroidism, pancreatic cancer (on chemo 1 week ago) with peritoneal carcinomatosis hx of LBO s/p colon stent placement complicated by perforation resulting with hemicolectomy and colostomy presenting outpatient per direction of oncologist for incidental finding of PE. Patient states that he was getting imaging for restaging which resulted in finding. Patient denies symptoms of SOB, CP, pleuritic CP, palpitations. Denies LE edema. Denies recent travel. Denies being sedentary for prolonged periods of time. Denies easy bruising bleeding, history of clots. Patient denies acute abdominal pain, nausea/emesis, melena, BPRBPR.

## 2018-05-03 NOTE — ED ADULT NURSE NOTE - OBJECTIVE STATEMENT
2029 pt 83ym aox4 sent by pmd fr yesterdays ct result for eval/r/o pe +clots, dx pancreatic ca last 11/2017 and on chemo last week, pt vss /pt able to verbalize concerns w/ family at bedside/ no distress no pain pending eval/gcruz

## 2018-05-03 NOTE — ED PROVIDER NOTE - ATTENDING CONTRIBUTION TO CARE
83 yom pmhx metastatic pancreatic ca w peritoneal carcinomatosis last chemo one week ago, lbo s/o colonic stenting c/b perforation requiring surgery previously, has scheduled surveillance ct for monitoring of his cancer, presents today at the direction of his oncologist Dr Conte after an incidental PE (right sided, segmental lower) was found on imaging. pt states if it wasn't for being told to come to the ED, he wouldn't have sought medical care - denies any cp or sob, states he feels the same as usual. no f/c.     ROS:   constitutional - no fever, no chills  eyes - no visual changes, no redness  eent - no sore throat, no nasal congestion  cvs - no chest pain, no leg swelling  resp - no shortness of breath, no cough  gi - no abdominal pain, no vomiting, no diarrhea  gu - no dysuria, no hematuria  msk - no acute back pain, no joint swelling  skin - no rashes, no jaundice  neuro - no headache, no focal weakness  psych - no acute mental health issue     Physical Exam:   constitutional - chronically ill appearing, awake and alert, oriented x3, cachectic  head - no external evidence of trauma  cvs - rrr, no murmurs, no peripheral edema  resp - breath sounds clear and equal bilat  gi - abdomen soft and nontender, no rigidity, guarding or rebound, bowel sounds present  msk - moving all extremities spontaneously  neuro - alert and oriented x3, no focal deficits, CNs 2-12 grossly intact  skin- no jaundice, warm and dry  psych - mood and affect wnl, no apparent risk to self or others     pt w pe on imaging however no HD instability - no hypotension or tachycardia, no hypoxia, no rv strain seen on imaging. pt is not currently on ac. discussed with pt's oncologist who recommended hospital admission has pesi score is high. initiated lovenox in ED. JUSTYN Dhaliwal MD

## 2018-05-03 NOTE — H&P ADULT - ATTENDING COMMENTS
Patient was previously unknown to me. Patient was assigned to me by hospitalist in charge. My involvement in this case consisted of initial history, physical and management plan. Primary day team to be assigned and to assume care in AM. Case discussed in detail with overnight medicine NP Patient was previously unknown to me. Patient was assigned to me by hospitalist in charge. My involvement in this case consisted of initial history, physical and management plan. Primary day team to be assigned and to assume care in AM. Case discussed in detail with overnight medicine NP, Eli [id number]

## 2018-05-03 NOTE — ED PROVIDER NOTE - PROGRESS NOTE DETAILS
Azar PGY3: discussed case with Dr. Conte patient with high pesi score and will be started on lovenox and get inpatient dvt studies to stratify need for filter

## 2018-05-03 NOTE — H&P ADULT - PROBLEM SELECTOR PLAN 4
Incomplete med reconciliation. Med list obtained from outpatient  ISTOP Reference #: [id number].   Patient noted to be on Ritalin (endorsed) will continue  Patent states on Oxycodone 5 daily prn.   Patient prescribed Lorazepam (did not endorse use)  Medical marijuana use also confirmed- Palliative to f/u. Incomplete med reconciliation. Med list obtained from outpatient records  Confirm all med dosages.  Of NOTE  ISTOP Reference #: [id number].   Patient noted to be on Ritalin (endorsed) will continue  Patent states on Oxycodone 5 daily prn.   Patient prescribed Lorazepam (did not endorse use)  Medical marijuana use also confirmed- Palliative to f/u.

## 2018-05-03 NOTE — ED PROVIDER NOTE - FAMILY HISTORY
Sibling  Still living? Unknown  Family history of lung cancer, Age at diagnosis: Age Unknown     Father  Still living? Unknown  Family history of pancreatic cancer, Age at diagnosis: Age Unknown

## 2018-05-03 NOTE — H&P ADULT - NSHPLABSRESULTS_GEN_ALL_CORE
Personally reviewed labs and noted in detail below    < from: CT Chest w/ IV Cont (05.02.18 @ 14:02) >    EXAM:  CT CHEST IC      EXAM:  CT ABDOMEN AND PELVIS OC IC        PROCEDURE DATE:  05/02/2018         INTERPRETATION:  CLINICAL INFORMATION: Metastatic pancreatic carcinoma   status post 3 months of chemotherapy for follow-up.    COMPARISON: Prior CTs, the most recent dated 2/8/2018.    PROCEDURE:   CT of the Chest, Abdomen and Pelvis was performed with intravenous   contrast.   Intravenous contrast: 90 ml Omnipaque 350. 10 ml discarded. Images   through the upper abdomen were obtained in both an early arterial and   later portal venous phase of enhancement. Portal venous phase images were   also obtained through the chest, abdomen, and pelvis.  Oral contrast: None.  Sagittal and coronal reformats were performed.    FINDINGS:    CHEST:     LUNGS AND LARGE AIRWAYS: Patent central airways. Several scattered less   than 5 mm nodules are unchanged.  PLEURA: No pleural effusion.  VESSELS: Filling defect in a right lower lobe segmental pulmonary artery   (2:1 and 601:55), consistent with pulmonary embolus..  HEART: Heart size is normal. No pericardial effusion. Coronary arterial   calcification.  MEDIASTINUM AND CHIQUITA: In the right peridiaphragmatic lymph node measures   2 x 1.9 cm (3:82), previously 1.6 x 1.5 cm.  CHEST WALL AND LOWER NECK: Again noted, are right thyroid nodules, the   largest measuring 2.7 cm. A subcentimeter hypodense lesion is also noted   in the left lobe of the thyroid gland, unchanged.    ABDOMEN AND PELVIS:    LIVER: Within normal limits.  BILE DUCTS: Normal caliber.  GALLBLADDER: Within normal limits.  SPLEEN: Early arterial enhancing lesions are noted in the spleen,   measuring up to 0.8 cm, unchanged.  PANCREAS: Ill-defined soft tissue density is again noted in the region of   the pancreatic tail, similar in appearance.  ADRENALS: Thickened left adrenal gland, unchanged.  KIDNEYS/URETERS: No hydronephrosis. Subcentimeter hypodense lesions in   the right kidney are too small to characterize, but unchanged.    BLADDER: Within normal limits.  REPRODUCTIVE ORGANS: The prostate is enlarged.    BOWEL: A stent is again noted in the descending colon. A right lower   quadrant colostomy is again noted. Questionable wall thickening of the   cecum.  PERITONEUM: No ascites. A right mid abdominal peritoneal implant measures   3.1 x 1.8 cm (3:96), previously 2.3 x 2.3 cm. An implant along the left   lateral wall of the urinary bladder (3:123) measures 1.7 x 1.2 cm,   previously 1.4 x 1.3 cm.  VESSELS:  Atherosclerotic calcification of the aorta and iliac arteries.   Splenic vein is thrombosed, with multiple collateral vessels in the   splenic hilum and adjacent to the stomach.  RETROPERITONEUM: No lymphadenopathy.    ABDOMINAL WALL: Within normal limits.  BONES: Degenerative changes in the spine. Lucent lesions in the iliac   bones, unchanged since 10/26/2017. A lesion with peripheral sclerotic   border in the right iliac bone is also unchanged. Anterolisthesis of L5   with respect to S1.    IMPRESSION: Ill-defined soft tissue density in the region of the   pancreatic tail, not significantly changed.    Chronic thrombosis of the splenic vein, with multiple perigastric and   perisplenic varices.    Right lower lobe segmental pulmonary embolus.    Increase in size of a right peridiaphragmatic lymph node.    Interval increase in size of peritoneal implants.    < end of copied text >    Personally reviewed EKG: Sinus Chucky 59 bpm  QTc 405 no appreciable ST segment elevation

## 2018-05-03 NOTE — ED PROVIDER NOTE - OBJECTIVE STATEMENT
83 year old, pancreatic cancer with last treatment 1 week ago (chemo). Presenting now with new PE dx after outpatient CT yesterday. Patient gets routine CT lung and abdomen every 4 months to evaluate progress of chemo and cancer and it was picked up incidentally. No shortness of breath or dyspnea on exertion. Positive fatigue for months with no change acutely. Denies swelling in legs. no recent travel or surgeries. no allergies to medications     PMD: Hao Lovett in [place]  Onc: diane Conte 83 year old, pancreatic cancer with last treatment 1 week ago (chemo). Presenting now with new PE dx after outpatient CT yesterday. Patient gets routine CT lung and abdomen every 4 months to evaluate progress of chemo and cancer and it was picked up incidentally. No shortness of breath or dyspnea on exertion. Positive fatigue for months with no change acutely. Denies swelling in legs. no recent travel or surgeries. no allergies to medications       CT read: "Filling defect in a right lower lobe segmental pulmonary artery   (2:1 and 601:55), consistent with pulmonary embolus."    PMD: Hao Lovett in [place]  Onc: diane Conte

## 2018-05-03 NOTE — H&P ADULT - PROBLEM SELECTOR PLAN 1
Patient with no current symptoms of SOB, pleuritic CP, CP, palpitations.  Patient with high PESI score and high mortality risk  LE Dopplers pending to eval for DVT and stratify need for filter  Lovenox initiated by ED team: Discussed benefits and risks with patient with regards to anticoagulation and amendable to treatment.  Primary day team to follow up with Heme/Onc in AM with choice of outpatient anticoagulation

## 2018-05-03 NOTE — ED PROVIDER NOTE - CONSTITUTIONAL [+], MLM
----- Message from SARAH Min sent at 5/12/2017  7:08 AM CDT -----  Please contact patient and inform unfortunately Cdiff is positive again. Recommend treating with Vancomycin 125 mg PO four times daily for 14 days. Please confirm pharmacy. She should take all of antibiotic as prescribed even if she's feeling better otherwise symptoms may return.       Pt should wash hands frequently with soap and water, especially after using the bathroom and prior to food preparation. Until diarrhea resolves, she should avoid using the same toilet as other family members. In addition, bathroom and kitchen areas (including toilet seats, toilet bowl, flush handle, sink faucet handles, and countertops) may be cleaned with a mixture of bleach and water (1 part bleach to every 10 parts water) to help prevent spread of infection.       I would like her to call us on Monday for an update on how she is doing. Follow up sooner for any fevers, severe abdominal pain, recurrent vomiting, or inability to tolerate antibiotics/fluids. Continue with daily probiotic and lots of clear fluids as well. Thank you!  
I do not see the e-advise message... Is it in a different encounter? Just want to be sure patient got this info.   
Patient informed of results and recommendations. She is very tearful and frustrated. She denies fever but is having a lot more abdominal pain and cramping. No blood in stool. She is having a bowel movement every 20 minutes and states her colon is empty as not much comes out any more. She feels she may be getting dehydrated. She is sleeping when she is not going to the bathroom. Encouraged her to increase liquids. I mentioned she may need IV fluids, she declined stating she didn't think she could do the car ride or side for fluids.       
Replied via e-advise  
Rx sent to pharmacy for her. Dose adjusted a little bit based on what her insurance will cover, will do Vanco 250 mg/5mL PO three times daily for 10 days instead. She needs to be sure she is keeping herself well hydrated. Try to take sips of water or gatorade every 10 minutes. If she at all cannot keep oral fluids down, may need to come into WIC or ER for IV fluids over the weekend. Please remind her to wash hands with soap and water only, and needs to clean her bathroom and other areas of her house with bleach and water only to be sure she is killing the bacteria. Spores are highly resistant to cleaning agents and will live for between 70-90 days outside the body and are only killed by cleaning agents containing Chlorine Bleach.  
She has an e-advise open. I sent it through there. I did verify it is in the patient's messages.   
WEIGHT LOSS

## 2018-05-03 NOTE — H&P ADULT - ASSESSMENT
84 yo man with PMH of hypothyroidism, pancreatic cancer (on chemo 1 week ago) with peritoneal carcinomatosis hx of LBO s/p colon stent placement complicated by perforation resulting with hemicolectomy and colostomy presenting outpatient per direction of oncologist for incidental finding of PE 84 yo man with PMH of hypothyroidism, pancreatic cancer (on chemo 1 week ago) with peritoneal carcinomatosis hx of LBO s/p colon stent placement complicated by perforation resulting with hemicolectomy and colostomy presenting outpatient per direction of oncologist for incidental finding of right lower lobe segmental PE

## 2018-05-03 NOTE — H&P ADULT - NSHPPHYSICALEXAM_GEN_ALL_CORE
Vital Signs Last 24 Hrs  T(C): 36.7 (03 May 2018 19:24), Max: 36.7 (03 May 2018 19:24)  T(F): 98 (03 May 2018 19:24), Max: 98 (03 May 2018 19:24)  HR: 74 (03 May 2018 19:24) (74 - 74)  BP: 139/79 (03 May 2018 19:24) (139/79 - 139/79)  BP(mean): --  RR: 16 (03 May 2018 19:24) (16 - 16)  SpO2: 98% (03 May 2018 19:24) (98% - 98%)      PHYSICAL EXAM:  GENERAL: cachetic elderly male, NAD, well-groomed  HEAD:  Atraumatic, Normocephalic  EYES: EOMI, PERRLA, conjunctiva and sclera clear  ENMT: Moist mucous membranes, Good dentition, No lesions  NECK: Supple, No JVD, Normal thyroid  NERVOUS SYSTEM:  Alert & Oriented X3, Good concentration; Motor Strength 5/5 B/L upper and lower extremities  CHEST/LUNG: Clear to percussion bilaterally; No rales, rhonchi, wheezing  HEART: Regular rate and rhythm +S1 S2; No murmurs, rubs, or gallops  ABDOMEN: Soft, Nontender, Nondistended, No guarding, No rigidity, +Ostomy; Bowel sounds present  EXTREMITIES:  2+ Peripheral Pulses, No clubbing, cyanosis, or edema  SKIN: No rashes or lesions

## 2018-05-03 NOTE — ED PROVIDER NOTE - MEDICAL DECISION MAKING DETAILS
[facility] PGY3: pulmonary embolism with cancer history. will start lovenox, discuss case with oncologist and dispo accordingly. may require admission given high mortality risk from comorbidities.

## 2018-05-03 NOTE — ED PROVIDER NOTE - PHYSICAL EXAMINATION
[facility]: A & O x 3, thin appearing NAD, HEENT WNL and no facial asymmetry; lungs CTAB, heart with reg rhythm; abdomen soft NTND; extremities with no edema; skin with no rashes, neuro exam non focal with no motor or sensory deficits [facility]: A & O x 3, thin appearing NAD, HEENT WNL and no facial asymmetry; lungs CTAB, heart with reg rhythm; abdomen soft with right upper quadrant ostomy, NTND; extremities with no edema; skin with no rashes, neuro exam non focal with no motor or sensory deficits

## 2018-05-04 ENCOUNTER — TRANSCRIPTION ENCOUNTER (OUTPATIENT)
Age: 83
End: 2018-05-04

## 2018-05-04 DIAGNOSIS — E03.9 HYPOTHYROIDISM, UNSPECIFIED: ICD-10-CM

## 2018-05-04 DIAGNOSIS — C25.9 MALIGNANT NEOPLASM OF PANCREAS, UNSPECIFIED: ICD-10-CM

## 2018-05-04 DIAGNOSIS — I26.99 OTHER PULMONARY EMBOLISM WITHOUT ACUTE COR PULMONALE: ICD-10-CM

## 2018-05-04 DIAGNOSIS — Z79.899 OTHER LONG TERM (CURRENT) DRUG THERAPY: ICD-10-CM

## 2018-05-04 LAB
ANION GAP SERPL CALC-SCNC: 11 MMOL/L — SIGNIFICANT CHANGE UP (ref 5–17)
BASOPHILS # BLD AUTO: 0.01 K/UL — SIGNIFICANT CHANGE UP (ref 0–0.2)
BASOPHILS NFR BLD AUTO: 0.3 % — SIGNIFICANT CHANGE UP (ref 0–2)
BUN SERPL-MCNC: 10 MG/DL — SIGNIFICANT CHANGE UP (ref 7–23)
CALCIUM SERPL-MCNC: 9 MG/DL — SIGNIFICANT CHANGE UP (ref 8.4–10.5)
CHLORIDE SERPL-SCNC: 102 MMOL/L — SIGNIFICANT CHANGE UP (ref 96–108)
CO2 SERPL-SCNC: 26 MMOL/L — SIGNIFICANT CHANGE UP (ref 22–31)
CREAT SERPL-MCNC: 0.75 MG/DL — SIGNIFICANT CHANGE UP (ref 0.5–1.3)
EOSINOPHIL # BLD AUTO: 0.03 K/UL — SIGNIFICANT CHANGE UP (ref 0–0.5)
EOSINOPHIL NFR BLD AUTO: 0.9 % — SIGNIFICANT CHANGE UP (ref 0–6)
GLUCOSE SERPL-MCNC: 91 MG/DL — SIGNIFICANT CHANGE UP (ref 70–99)
HCT VFR BLD CALC: 31.9 % — LOW (ref 39–50)
HGB BLD-MCNC: 10.5 G/DL — LOW (ref 13–17)
IMM GRANULOCYTES NFR BLD AUTO: 0.9 % — SIGNIFICANT CHANGE UP (ref 0–1.5)
LYMPHOCYTES # BLD AUTO: 0.75 K/UL — LOW (ref 1–3.3)
LYMPHOCYTES # BLD AUTO: 22.9 % — SIGNIFICANT CHANGE UP (ref 13–44)
MAGNESIUM SERPL-MCNC: 1.9 MG/DL — SIGNIFICANT CHANGE UP (ref 1.6–2.6)
MCHC RBC-ENTMCNC: 30.1 PG — SIGNIFICANT CHANGE UP (ref 27–34)
MCHC RBC-ENTMCNC: 32.9 GM/DL — SIGNIFICANT CHANGE UP (ref 32–36)
MCV RBC AUTO: 91.4 FL — SIGNIFICANT CHANGE UP (ref 80–100)
MONOCYTES # BLD AUTO: 0.26 K/UL — SIGNIFICANT CHANGE UP (ref 0–0.9)
MONOCYTES NFR BLD AUTO: 8 % — SIGNIFICANT CHANGE UP (ref 2–14)
NEUTROPHILS # BLD AUTO: 2.19 K/UL — SIGNIFICANT CHANGE UP (ref 1.8–7.4)
NEUTROPHILS NFR BLD AUTO: 67 % — SIGNIFICANT CHANGE UP (ref 43–77)
PHOSPHATE SERPL-MCNC: 2.9 MG/DL — SIGNIFICANT CHANGE UP (ref 2.5–4.5)
PLATELET # BLD AUTO: 167 K/UL — SIGNIFICANT CHANGE UP (ref 150–400)
POTASSIUM SERPL-MCNC: 3.5 MMOL/L — SIGNIFICANT CHANGE UP (ref 3.5–5.3)
POTASSIUM SERPL-SCNC: 3.5 MMOL/L — SIGNIFICANT CHANGE UP (ref 3.5–5.3)
RBC # BLD: 3.49 M/UL — LOW (ref 4.2–5.8)
RBC # FLD: 15.6 % — HIGH (ref 10.3–14.5)
SODIUM SERPL-SCNC: 139 MMOL/L — SIGNIFICANT CHANGE UP (ref 135–145)
WBC # BLD: 3.27 K/UL — LOW (ref 3.8–10.5)
WBC # FLD AUTO: 3.27 K/UL — LOW (ref 3.8–10.5)

## 2018-05-04 PROCEDURE — 99222 1ST HOSP IP/OBS MODERATE 55: CPT

## 2018-05-04 PROCEDURE — 99233 SBSQ HOSP IP/OBS HIGH 50: CPT

## 2018-05-04 PROCEDURE — 93970 EXTREMITY STUDY: CPT | Mod: 26

## 2018-05-04 RX ORDER — ENOXAPARIN SODIUM 100 MG/ML
40 INJECTION SUBCUTANEOUS EVERY 12 HOURS
Qty: 0 | Refills: 0 | Status: DISCONTINUED | OUTPATIENT
Start: 2018-05-04 | End: 2018-05-04

## 2018-05-04 RX ORDER — ENOXAPARIN SODIUM 100 MG/ML
70 INJECTION SUBCUTANEOUS
Qty: 30 | Refills: 0 | OUTPATIENT
Start: 2018-05-04 | End: 2018-05-12

## 2018-05-04 RX ORDER — LIPASE/PROTEASE/AMYLASE 16-48-48K
2 CAPSULE,DELAYED RELEASE (ENTERIC COATED) ORAL
Qty: 0 | Refills: 0 | COMMUNITY

## 2018-05-04 RX ORDER — METHYLPHENIDATE HCL 5 MG
1 TABLET ORAL
Qty: 0 | Refills: 0 | COMMUNITY

## 2018-05-04 RX ORDER — ENOXAPARIN SODIUM 100 MG/ML
70 INJECTION SUBCUTANEOUS
Qty: 0 | Refills: 0 | Status: DISCONTINUED | OUTPATIENT
Start: 2018-05-05 | End: 2018-05-05

## 2018-05-04 RX ORDER — SERTRALINE 25 MG/1
1 TABLET, FILM COATED ORAL
Qty: 0 | Refills: 0 | COMMUNITY

## 2018-05-04 RX ORDER — LIPASE/PROTEASE/AMYLASE 16-48-48K
2 CAPSULE,DELAYED RELEASE (ENTERIC COATED) ORAL
Qty: 0 | Refills: 0 | Status: DISCONTINUED | OUTPATIENT
Start: 2018-05-04 | End: 2018-05-05

## 2018-05-04 RX ORDER — METHYLPHENIDATE HCL 5 MG
5 TABLET ORAL DAILY
Qty: 0 | Refills: 0 | Status: DISCONTINUED | OUTPATIENT
Start: 2018-05-04 | End: 2018-05-05

## 2018-05-04 RX ORDER — OXYCODONE HYDROCHLORIDE 5 MG/1
5 TABLET ORAL EVERY 24 HOURS
Qty: 0 | Refills: 0 | Status: DISCONTINUED | OUTPATIENT
Start: 2018-05-04 | End: 2018-05-05

## 2018-05-04 RX ORDER — ENOXAPARIN SODIUM 100 MG/ML
40 INJECTION SUBCUTANEOUS ONCE
Qty: 0 | Refills: 0 | Status: COMPLETED | OUTPATIENT
Start: 2018-05-04 | End: 2018-05-04

## 2018-05-04 RX ORDER — ENOXAPARIN SODIUM 100 MG/ML
70 INJECTION SUBCUTANEOUS
Qty: 70 | Refills: 0 | OUTPATIENT
Start: 2018-05-04 | End: 2018-05-12

## 2018-05-04 RX ORDER — OXYCODONE HYDROCHLORIDE 5 MG/1
1 TABLET ORAL
Qty: 0 | Refills: 0 | COMMUNITY

## 2018-05-04 RX ORDER — LEVOTHYROXINE SODIUM 125 MCG
75 TABLET ORAL
Qty: 0 | Refills: 0 | COMMUNITY

## 2018-05-04 RX ADMIN — ENOXAPARIN SODIUM 40 MILLIGRAM(S): 100 INJECTION SUBCUTANEOUS at 00:16

## 2018-05-04 RX ADMIN — Medication 2 CAPSULE(S): at 11:29

## 2018-05-04 RX ADMIN — ENOXAPARIN SODIUM 40 MILLIGRAM(S): 100 INJECTION SUBCUTANEOUS at 11:29

## 2018-05-04 RX ADMIN — ENOXAPARIN SODIUM 40 MILLIGRAM(S): 100 INJECTION SUBCUTANEOUS at 20:07

## 2018-05-04 RX ADMIN — Medication 5 MILLIGRAM(S): at 14:05

## 2018-05-04 RX ADMIN — Medication 2 CAPSULE(S): at 19:52

## 2018-05-04 NOTE — CONSULT NOTE ADULT - SUBJECTIVE AND OBJECTIVE BOX
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 Patinet has been on chemotherapy Gemzar and Abraxane until 1 week ago. Recent CT scan consitent with disease progression. Incidental finding of a defect in a right lower lobe segmental pulmonary artery consistent with a thrombus. Duplex legs no thrombosis.      Past Medical History:  Hyperlipemia    Hypothyroid    Pancreatic cancer.    Past Surgical History:  H/O hemicolectomy    H/O hernia repair    S/P tonsillectomy.    Family History:  Sibling  Still living? Unknown  Family history of lung cancer, Age at diagnosis: Age Unknown     Father  Still living? Unknown  Family history of pancreatic cancer, Age at diagnosis: Age Unknown.    NKDA    VITAL SIGNS:  T(F): 97.9 (05-04-18 @ 13:20)  HR: 54 (05-04-18 @ 13:20)  BP: 131/74 (05-04-18 @ 13:20)  RR: 16 (05-04-18 @ 13:20)  SpO2: 98% (05-04-18 @ 13:20)  Wt(kg): --    PHYSICAL EXAM:    Constitutional: NAD  Eyes: EOMI, sclera non-icteric  Neck: supple, no masses, no JVD  Respiratory: CTA b/l, good air entry b/l  Cardiovascular: RRR, no M/R/G  Gastrointestinal: soft, NTND, Colostomy bag site clean no masses palpable, + BS, no hepatosplenomegaly  Extremities: no c/c/e  Neurological: AAOx3      MEDICATIONS  (STANDING):  amylase/lipase/protease  (CREON 12,000 Units) 2 Capsule(s) Oral two times a day  enoxaparin Injectable 40 milliGRAM(s) SubCutaneous once  methylphenidate 5 milliGRAM(s) Oral daily    MEDICATIONS  (PRN):  oxyCODONE    IR 5 milliGRAM(s) Oral every 24 hours PRN Severe Pain (7 - 10)      Allergies    No Known Allergies    Intolerances        LABS:                        10.5   3.27  )-----------( 167      ( 04 May 2018 07:26 )             31.9     05-04    139  |  102  |  10  ----------------------------<  91  3.5   |  26  |  0.75    Ca    9.0      04 May 2018 05:38  Phos  2.9     05-04  Mg     1.9     05-04    TPro  6.3  /  Alb  3.7  /  TBili  0.4  /  DBili  x   /  AST  15  /  ALT  7<L>  /  AlkPhos  87  05-03    PT/INR - ( 03 May 2018 20:46 )   PT: 11.6 sec;   INR: 1.07 ratio         PTT - ( 03 May 2018 20:46 )  PTT:29.5 sec      RADIOLOGY & ADDITIONAL TESTS:  EXAM: CT CHEST IC     EXAM: CT ABDOMEN AND PELVIS OC IC       PROCEDURE DATE: 05/02/2018         INTERPRETATION: CLINICAL INFORMATION: Metastatic pancreatic carcinoma   status post 3 months of chemotherapy for follow-up.     COMPARISON: Prior CTs, the most recent dated 2/8/2018.     PROCEDURE:   CT of the Chest, Abdomen and Pelvis was performed with intravenous contrast.   Intravenous contrast: 90 ml Omnipaque 350. 10 ml discarded. Images through   the upper abdomen were obtained in both an early arterial and later portal   venous phase of enhancement. Portal venous phase images were also obtained   through the chest, abdomen, and pelvis.   Oral contrast: None.   Sagittal and coronal reformats were performed.     FINDINGS:     CHEST:     LUNGS AND LARGE AIRWAYS: Patent central airways. Several scattered less than   5 mm nodules are unchanged.   PLEURA: No pleural effusion.   VESSELS: Filling defect in a right lower lobe segmental pulmonary artery   (2:1 and 601:55), consistent with pulmonary embolus..   HEART: Heart size is normal. No pericardial effusion. Coronary arterial   calcification.   MEDIASTINUM AND CHIQUITA: In the right peridiaphragmatic lymph node measures 2 x   1.9 cm (3:82), previously 1.6 x 1.5 cm.   CHEST WALL AND LOWER NECK: Again noted, are right thyroid nodules, the   largest measuring 2.7 cm. A subcentimeter hypodense lesion is also noted in   the left lobe of the thyroid gland, unchanged.     ABDOMEN AND PELVIS:     LIVER: Within normal limits.   BILE DUCTS: Normal caliber.   GALLBLADDER: Within normal limits.   SPLEEN: Early arterial enhancing lesions are noted in the spleen, measuring   up to 0.8 cm, unchanged.   PANCREAS: Ill-defined soft tissue density is again noted in the region of   the pancreatic tail, similar in appearance.   ADRENALS: Thickened left adrenal gland, unchanged.   KIDNEYS/URETERS: No hydronephrosis. Subcentimeter hypodense lesions in the   right kidney are too small to characterize, but unchanged.     BLADDER: Within normal limits.   REPRODUCTIVE ORGANS: The prostate is enlarged.     BOWEL: A stent is again noted in the descending colon. A right lower   quadrant colostomy is again noted. Questionable wall thickening of the cecum.   PERITONEUM: No ascites. A right mid abdominal peritoneal implant measures   3.1 x 1.8 cm (3:96), previously 2.3 x 2.3 cm. An implant along the left   lateral wall of the urinary bladder (3:123) measures 1.7 x 1.2 cm,   previously 1.4 x 1.3 cm.   VESSELS: Atherosclerotic calcification of the aorta and iliac arteries.   Splenic vein is thrombosed, with multiple collateral vessels in the splenic   hilum and adjacent to the stomach.   RETROPERITONEUM: No lymphadenopathy.   ABDOMINAL WALL: Within normal limits.   BONES: Degenerative changes in the spine. Lucent lesions in the iliac bones,   unchanged since 10/26/2017. A lesion with peripheral sclerotic border in the   right iliac bone is also unchanged. Anterolisthesis of L5 with respect to S1.     IMPRESSION: Ill-defined soft tissue density in the region of the pancreatic   tail, not significantly changed.     Chronic thrombosis of the splenic vein, with multiple perigastric and   perisplenic varices.     Right lower lobe segmental pulmonary embolus.     Increase in size of a right peridiaphragmatic lymph node.     Interval increase in size of peritoneal implants.     These findings were discussed with Dr. Conte on 5/3/2018 at 2:39 PM by Dr. Durham with read back confirmation.

## 2018-05-04 NOTE — DISCHARGE NOTE ADULT - MEDICATION SUMMARY - MEDICATIONS TO TAKE
I will START or STAY ON the medications listed below when I get home from the hospital:    oxyCODONE 5 mg oral tablet  -- 1 tab(s) by mouth every 12-24 hours, As Needed  -- Indication: For Chronic Pain    enoxaparin 80 mg/0.8 mL injectable solution  -- 70 milligram(s) subcutaneously once a day   -- It is very important that you take or use this exactly as directed.  Do not skip doses or discontinue unless directed by your doctor.    -- Indication: For Pulmonary embolism    sertraline 25 mg oral tablet  -- 1 tab(s) by mouth once a day  -- Indication: For anxiety    Ritalin 5 mg oral tablet  -- 1 tab(s) by mouth once a day  -- Indication: For anxiety    Creon 24,000 units oral delayed release capsule  -- 2 cap(s) by mouth (before meals) 1 cap (s) by mouth before a snack  -- Indication: For anxiety    Synthroid  -- 75 microgram(s) by mouth once a day  -- Indication: For Hypothyroid

## 2018-05-04 NOTE — DISCHARGE NOTE ADULT - CARE PLAN
Principal Discharge DX:	Pulmonary embolism  Goal:	full resolution  Assessment and plan of treatment:	Continue lovenox as ordered daily  Secondary Diagnosis:	Pancreatic cancer  Secondary Diagnosis:	Hypothyroid Principal Discharge DX:	Pulmonary embolism  Goal:	full resolution  Assessment and plan of treatment:	Continue lovenox as ordered daily  Take your anticoagulation Lovenox- as directed.  Follow up with your health care provider within one week. Call for appointment.  If you develop shortness of breath or if your shortness of breath worsens call your Health Care Provider or go to the Emergency Department.  Secondary Diagnosis:	Pancreatic cancer  Assessment and plan of treatment:	Please follow up with Dr. Nu Conte within 1 week  Secondary Diagnosis:	Hypothyroid  Assessment and plan of treatment:	You do not make enough thyroid hormone  Signs & symptoms of low levels of thyroid hormone - tired, getting cold easily, coarse or thin hair, constipation, shortness of breath, swelling, irregular periods  your doctor will do thyroid hormone blood tests at least once a year to monitor if medication dose is adequate take your thyroid medicine as directed by your doctor & on empty stomach

## 2018-05-04 NOTE — DISCHARGE NOTE ADULT - ADDITIONAL INSTRUCTIONS
Follow up with oncology and pcp within 5 days of discharge Follow up with Oncology Dr. Conte and pcp within 5 days of discharge

## 2018-05-04 NOTE — PROGRESS NOTE ADULT - SUBJECTIVE AND OBJECTIVE BOX
Patient is a 83y old  Male who presents with a chief complaint of found to have a clot in my lungs (04 May 2018 14:03)    HPI: Pt feels well. Denies pain or dyspnea. Daughter Leanne by bedside.     Vital Signs Last 24 Hrs  T(C): 36.6 (04 May 2018 13:20), Max: 36.8 (04 May 2018 01:07)  T(F): 97.9 (04 May 2018 13:20), Max: 98.2 (04 May 2018 01:07)  HR: 54 (04 May 2018 13:20) (53 - 74)  BP: 131/74 (04 May 2018 13:20) (111/59 - 139/79)  BP(mean): --  RR: 16 (04 May 2018 13:20) (16 - 17)  SpO2: 98% (04 May 2018 13:20) (98% - 100%)                          10.5   3.27  )-----------( 167      ( 04 May 2018 07:26 )             31.9     05-04    139  |  102  |  10  ----------------------------<  91  3.5   |  26  |  0.75    Ca    9.0      04 May 2018 05:38  Phos  2.9     05-04  Mg     1.9     05-04    TPro  6.3  /  Alb  3.7  /  TBili  0.4  /  DBili  x   /  AST  15  /  ALT  7<L>  /  AlkPhos  87  05-03

## 2018-05-04 NOTE — DISCHARGE NOTE ADULT - PATIENT PORTAL LINK FT
You can access the [facility] Portal, offered by [facility], by registering with the following website: [email or web address]

## 2018-05-04 NOTE — PROGRESS NOTE ADULT - PROBLEM SELECTOR PLAN 1
Continue Lovenox. Changed to daily dosing starting tomorrow. Daughter Leanne to be educated on injecting with tomorrow's dose.

## 2018-05-04 NOTE — DISCHARGE NOTE ADULT - CARE PROVIDER_API CALL
Leanne Conte (DO), HematologyOncology; Internal Medicine  [address] [place] [address]  Phone: [phone]  Fax: [phone]

## 2018-05-04 NOTE — DISCHARGE NOTE ADULT - PLAN OF CARE
full resolution Continue lovenox as ordered daily Continue lovenox as ordered daily  Take your anticoagulation Lovenox- as directed.  Follow up with your health care provider within one week. Call for appointment.  If you develop shortness of breath or if your shortness of breath worsens call your Health Care Provider or go to the Emergency Department. Please follow up with Dr. Nu Conte within 1 week You do not make enough thyroid hormone  Signs & symptoms of low levels of thyroid hormone - tired, getting cold easily, coarse or thin hair, constipation, shortness of breath, swelling, irregular periods  your doctor will do thyroid hormone blood tests at least once a year to monitor if medication dose is adequate take your thyroid medicine as directed by your doctor & on empty stomach

## 2018-05-04 NOTE — DISCHARGE NOTE ADULT - HOSPITAL COURSE
83 year old  male with PMHX pancreatic cancer currently on chemotherapy now admitted with  Pulmonary embolism and started on Lovenox daily. Stable for discharge home 83 year old male with PMHX pancreatic cancer currently on chemotherapy now admitted with  Pulmonary embolism and started on Lovenox daily. Stable for discharge home. To follow up with Dr. Conte and PMD within 1 week 83 year old male w metastatic pancreatic ca on chemo, hx of LBO s/p colon stent placement complicated by perforation resulting with hemicolectomy and colostomy sent in by Oncologist after outpatient CT C/a/p showed a RLL segmental PE.    Admitted and started on Lovenox. Respiratory and hemodynamic status monitored.    Lovenox transitioned to daily dosing, daughter taught how to inject.     Discharged with follow up.    Diagnoses: Acute PE; Pancreatic cancer; Pain due to cancer; Pancreatic insufficiency; Colostomy status; Leukopenia due to chemotherapy; Hypothyroidism; Severe protein calorie 83 year old male w metastatic pancreatic ca on chemo, hx of LBO s/p colon stent placement complicated by perforation resulting with hemicolectomy and colostomy sent in by Oncologist after outpatient CT C/a/p showed a RLL segmental PE.    Admitted and started on Lovenox. Respiratory and hemodynamic status monitored.    Lovenox transitioned to daily dosing, daughter taught how to inject.     Discharged with follow up.    Diagnoses: Acute PE; Pancreatic cancer; Pain due to cancer; Pancreatic insufficiency; Colostomy status; Leukopenia due to chemotherapy; Hypothyroidism; Severe protein calorie malnutriton 83 year old male w metastatic pancreatic ca on chemo, hx of LBO s/p colon stent placement complicated by perforation resulting with hemicolectomy and colostomy sent in by Oncologist after outpatient CT C/a/p showed a RLL segmental PE.    Admitted and started on Lovenox. Respiratory and hemodynamic status monitored.    Lovenox transitioned to daily dosing, daughter taught how to inject.     Discharged with follow up.    Diagnoses: Acute PE; Pancreatic cancer; Peritoneal metastases; Lymph node metastases; Pain due to cancer; Pancreatic insufficiency; Colostomy status; Leukopenia due to chemotherapy; Hypothyroidism; Severe protein calorie malnutrition

## 2018-05-04 NOTE — CONSULT NOTE ADULT - ASSESSMENT
82 y/o man with hx of pancreatic cancer on chemotherapy (last chemotherapy) given 1 week ago admitted with incidental finding of a PE.     Plan: PE: Continue AC with Lovenox.  Pancreatic cancer: follow up with his medical oncologist, Dr Leanne Conte at [facility].

## 2018-05-05 VITALS
SYSTOLIC BLOOD PRESSURE: 132 MMHG | HEART RATE: 63 BPM | TEMPERATURE: 98 F | DIASTOLIC BLOOD PRESSURE: 73 MMHG | RESPIRATION RATE: 18 BRPM | OXYGEN SATURATION: 96 %

## 2018-05-05 LAB
HCT VFR BLD CALC: 33 % — LOW (ref 39–50)
HGB BLD-MCNC: 11.1 G/DL — LOW (ref 13–17)
MCHC RBC-ENTMCNC: 30.8 PG — SIGNIFICANT CHANGE UP (ref 27–34)
MCHC RBC-ENTMCNC: 33.6 GM/DL — SIGNIFICANT CHANGE UP (ref 32–36)
MCV RBC AUTO: 91.4 FL — SIGNIFICANT CHANGE UP (ref 80–100)
PLATELET # BLD AUTO: 144 K/UL — LOW (ref 150–400)
RBC # BLD: 3.6 M/UL — LOW (ref 4.2–5.8)
RBC # FLD: 14.8 % — HIGH (ref 10.3–14.5)
WBC # BLD: 4.5 K/UL — SIGNIFICANT CHANGE UP (ref 3.8–10.5)
WBC # FLD AUTO: 4.5 K/UL — SIGNIFICANT CHANGE UP (ref 3.8–10.5)

## 2018-05-05 PROCEDURE — 99239 HOSP IP/OBS DSCHRG MGMT >30: CPT

## 2018-05-05 RX ORDER — ENOXAPARIN SODIUM 100 MG/ML
70 INJECTION SUBCUTANEOUS
Qty: 30 | Refills: 0 | OUTPATIENT
Start: 2018-05-05 | End: 2018-06-03

## 2018-05-05 RX ADMIN — ENOXAPARIN SODIUM 70 MILLIGRAM(S): 100 INJECTION SUBCUTANEOUS at 09:10

## 2018-05-05 RX ADMIN — Medication 2 CAPSULE(S): at 08:47

## 2018-05-05 NOTE — CHART NOTE - NSCHARTNOTEFT_GEN_A_CORE
Pt seen and examined.     Feels well. Daughter by bedside.     D/c home today.    Dx    1. Acute PE    2. Pancreatic ca    D/c time 40 mins

## 2018-05-09 ENCOUNTER — RESULT REVIEW (OUTPATIENT)
Age: 83
End: 2018-05-09

## 2018-05-09 ENCOUNTER — APPOINTMENT (OUTPATIENT)
Age: 83
End: 2018-05-09
Payer: MEDICARE

## 2018-05-09 ENCOUNTER — LABORATORY RESULT (OUTPATIENT)
Age: 83
End: 2018-05-09

## 2018-05-09 VITALS
OXYGEN SATURATION: 100 % | WEIGHT: 99.21 LBS | SYSTOLIC BLOOD PRESSURE: 128 MMHG | RESPIRATION RATE: 16 BRPM | HEART RATE: 67 BPM | BODY MASS INDEX: 17.57 KG/M2 | DIASTOLIC BLOOD PRESSURE: 81 MMHG

## 2018-05-09 DIAGNOSIS — I26.99 OTHER PULMONARY EMBOLISM W/OUT ACUTE COR PULMONALE: ICD-10-CM

## 2018-05-09 DIAGNOSIS — E03.9 HYPOTHYROIDISM, UNSPECIFIED: ICD-10-CM

## 2018-05-09 LAB
BASOPHILS # BLD AUTO: 0 K/UL — SIGNIFICANT CHANGE UP (ref 0–0.2)
BASOPHILS NFR BLD AUTO: 0.6 % — SIGNIFICANT CHANGE UP (ref 0–2)
EOSINOPHIL # BLD AUTO: 0.1 K/UL — SIGNIFICANT CHANGE UP (ref 0–0.5)
EOSINOPHIL NFR BLD AUTO: 0.8 % — SIGNIFICANT CHANGE UP (ref 0–6)
HCT VFR BLD CALC: 36 % — LOW (ref 39–50)
HGB BLD-MCNC: 12.1 G/DL — LOW (ref 13–17)
LYMPHOCYTES # BLD AUTO: 1 K/UL — SIGNIFICANT CHANGE UP (ref 1–3.3)
LYMPHOCYTES # BLD AUTO: 15.6 % — SIGNIFICANT CHANGE UP (ref 13–44)
MCHC RBC-ENTMCNC: 30.8 PG — SIGNIFICANT CHANGE UP (ref 27–34)
MCHC RBC-ENTMCNC: 33.5 G/DL — SIGNIFICANT CHANGE UP (ref 32–36)
MCV RBC AUTO: 91.9 FL — SIGNIFICANT CHANGE UP (ref 80–100)
MONOCYTES # BLD AUTO: 0.5 K/UL — SIGNIFICANT CHANGE UP (ref 0–0.9)
MONOCYTES NFR BLD AUTO: 7.6 % — SIGNIFICANT CHANGE UP (ref 2–14)
NEUTROPHILS # BLD AUTO: 4.8 K/UL — SIGNIFICANT CHANGE UP (ref 1.8–7.4)
NEUTROPHILS NFR BLD AUTO: 75.3 % — SIGNIFICANT CHANGE UP (ref 43–77)
PLATELET # BLD AUTO: 169 K/UL — SIGNIFICANT CHANGE UP (ref 150–400)
RBC # BLD: 3.92 M/UL — LOW (ref 4.2–5.8)
RBC # FLD: 15.4 % — HIGH (ref 10.3–14.5)
WBC # BLD: 6.4 K/UL — SIGNIFICANT CHANGE UP (ref 3.8–10.5)
WBC # FLD AUTO: 6.4 K/UL — SIGNIFICANT CHANGE UP (ref 3.8–10.5)

## 2018-05-09 PROCEDURE — 99214 OFFICE O/P EST MOD 30 MIN: CPT

## 2018-05-09 RX ORDER — OXYCODONE 5 MG/1
5 TABLET ORAL
Qty: 180 | Refills: 0 | Status: ACTIVE | COMMUNITY
Start: 2018-01-16 | End: 1900-01-01

## 2018-05-10 PROBLEM — C25.9 MALIGNANT NEOPLASM OF PANCREAS, UNSPECIFIED: Chronic | Status: ACTIVE | Noted: 2018-05-03

## 2018-05-10 RX ORDER — RIVAROXABAN 15 MG-20MG
15 & 20 KIT ORAL
Qty: 51 | Refills: 3 | Status: ACTIVE | COMMUNITY
Start: 2018-05-10 | End: 1900-01-01

## 2018-05-14 ENCOUNTER — APPOINTMENT (OUTPATIENT)
Dept: GERIATRICS | Facility: CLINIC | Age: 83
End: 2018-05-14
Payer: MEDICARE

## 2018-05-14 VITALS
OXYGEN SATURATION: 98 % | SYSTOLIC BLOOD PRESSURE: 100 MMHG | RESPIRATION RATE: 16 BRPM | BODY MASS INDEX: 17.38 KG/M2 | DIASTOLIC BLOOD PRESSURE: 70 MMHG | HEART RATE: 68 BPM | WEIGHT: 98.08 LBS | TEMPERATURE: 98.2 F

## 2018-05-14 DIAGNOSIS — F41.9 ANXIETY DISORDER, UNSPECIFIED: ICD-10-CM

## 2018-05-14 DIAGNOSIS — F32.9 ANXIETY DISORDER, UNSPECIFIED: ICD-10-CM

## 2018-05-14 DIAGNOSIS — M54.9 DORSALGIA, UNSPECIFIED: ICD-10-CM

## 2018-05-14 PROBLEM — E03.9 HYPOTHYROIDISM, UNSPECIFIED TYPE: Status: ACTIVE | Noted: 2017-11-07

## 2018-05-14 PROBLEM — I26.99 PULMONARY EMBOLI: Status: ACTIVE | Noted: 2018-05-10

## 2018-05-14 LAB — TSH SERPL-ACNC: 5.61 UIU/ML

## 2018-05-14 PROCEDURE — 99215 OFFICE O/P EST HI 40 MIN: CPT

## 2018-05-15 ENCOUNTER — APPOINTMENT (OUTPATIENT)
Dept: GERIATRICS | Facility: CLINIC | Age: 83
End: 2018-05-15

## 2018-05-16 PROBLEM — F41.9 ANXIETY AND DEPRESSION: Status: ACTIVE | Noted: 2018-01-31

## 2018-05-16 PROBLEM — M54.9 BACK PAIN WITHOUT RADIATION: Status: ACTIVE | Noted: 2018-01-02

## 2018-05-23 PROCEDURE — 71260 CT THORAX DX C+: CPT

## 2018-05-23 PROCEDURE — 85730 THROMBOPLASTIN TIME PARTIAL: CPT

## 2018-05-23 PROCEDURE — 83735 ASSAY OF MAGNESIUM: CPT

## 2018-05-23 PROCEDURE — 93005 ELECTROCARDIOGRAM TRACING: CPT

## 2018-05-23 PROCEDURE — 85027 COMPLETE CBC AUTOMATED: CPT

## 2018-05-23 PROCEDURE — 83880 ASSAY OF NATRIURETIC PEPTIDE: CPT

## 2018-05-23 PROCEDURE — 80048 BASIC METABOLIC PNL TOTAL CA: CPT

## 2018-05-23 PROCEDURE — 84484 ASSAY OF TROPONIN QUANT: CPT

## 2018-05-23 PROCEDURE — 99285 EMERGENCY DEPT VISIT HI MDM: CPT

## 2018-05-23 PROCEDURE — 74177 CT ABD & PELVIS W/CONTRAST: CPT

## 2018-05-23 PROCEDURE — 84100 ASSAY OF PHOSPHORUS: CPT

## 2018-05-23 PROCEDURE — 82565 ASSAY OF CREATININE: CPT

## 2018-05-23 PROCEDURE — 80053 COMPREHEN METABOLIC PANEL: CPT

## 2018-05-23 PROCEDURE — 93970 EXTREMITY STUDY: CPT

## 2018-05-23 PROCEDURE — 85610 PROTHROMBIN TIME: CPT

## 2018-05-24 ENCOUNTER — OUTPATIENT (OUTPATIENT)
Dept: OUTPATIENT SERVICES | Facility: HOSPITAL | Age: 83
LOS: 1 days | Discharge: HOME | End: 2018-05-24

## 2018-05-24 DIAGNOSIS — Z90.89 ACQUIRED ABSENCE OF OTHER ORGANS: Chronic | ICD-10-CM

## 2018-05-24 DIAGNOSIS — Z90.49 ACQUIRED ABSENCE OF OTHER SPECIFIED PARTS OF DIGESTIVE TRACT: Chronic | ICD-10-CM

## 2018-05-24 DIAGNOSIS — Z98.890 OTHER SPECIFIED POSTPROCEDURAL STATES: Chronic | ICD-10-CM

## 2018-05-24 DIAGNOSIS — C25.9 MALIGNANT NEOPLASM OF PANCREAS, UNSPECIFIED: ICD-10-CM

## 2018-05-29 ENCOUNTER — APPOINTMENT (OUTPATIENT)
Dept: HEMATOLOGY ONCOLOGY | Facility: CLINIC | Age: 83
End: 2018-05-29
Payer: MEDICARE

## 2018-05-29 VITALS
SYSTOLIC BLOOD PRESSURE: 112 MMHG | BODY MASS INDEX: 17.07 KG/M2 | RESPIRATION RATE: 16 BRPM | TEMPERATURE: 97.7 F | DIASTOLIC BLOOD PRESSURE: 73 MMHG | WEIGHT: 96.34 LBS | OXYGEN SATURATION: 100 % | HEART RATE: 79 BPM

## 2018-05-29 PROCEDURE — 99215 OFFICE O/P EST HI 40 MIN: CPT

## 2018-05-29 RX ORDER — MORPHINE SULFATE 15 MG/1
15 TABLET, FILM COATED, EXTENDED RELEASE ORAL
Qty: 60 | Refills: 0 | Status: ACTIVE | COMMUNITY
Start: 2018-05-29 | End: 1900-01-01

## 2018-05-29 RX ORDER — LEVOTHYROXINE SODIUM 0.07 MG/1
75 TABLET ORAL
Qty: 30 | Refills: 1 | Status: ACTIVE | COMMUNITY
Start: 2017-10-02 | End: 1900-01-01

## 2018-05-29 RX ORDER — CAPECITABINE 500 MG/1
500 TABLET ORAL
Qty: 56 | Refills: 0 | Status: DISCONTINUED | COMMUNITY
Start: 2018-05-10 | End: 2018-05-29

## 2018-06-11 ENCOUNTER — APPOINTMENT (OUTPATIENT)
Dept: GERIATRICS | Facility: CLINIC | Age: 83
End: 2018-06-11
Payer: MEDICARE

## 2018-06-11 DIAGNOSIS — R63.0 ANOREXIA: ICD-10-CM

## 2018-06-11 DIAGNOSIS — Z51.5 ENCOUNTER FOR PALLIATIVE CARE: ICD-10-CM

## 2018-06-11 DIAGNOSIS — R53.83 OTHER FATIGUE: ICD-10-CM

## 2018-06-11 DIAGNOSIS — C25.9 MALIGNANT NEOPLASM OF PANCREAS, UNSPECIFIED: ICD-10-CM

## 2018-06-11 DIAGNOSIS — G89.3 NEOPLASM RELATED PAIN (ACUTE) (CHRONIC): ICD-10-CM

## 2018-06-11 PROCEDURE — 99215 OFFICE O/P EST HI 40 MIN: CPT

## 2018-06-13 ENCOUNTER — MEDICATION RENEWAL (OUTPATIENT)
Age: 83
End: 2018-06-13

## 2018-06-13 RX ORDER — RIVAROXABAN 20 MG/1
20 TABLET, FILM COATED ORAL
Qty: 30 | Refills: 6 | Status: ACTIVE | COMMUNITY
Start: 2018-06-13 | End: 1900-01-01

## 2018-11-07 NOTE — PROGRESS NOTE ADULT - ASSESSMENT
Assessment:    ·	82y Male who presents with Small bowel obstructiond     Plan:  -DVT PPX- Lovenox  -Pain control   -OOB as tolerated with assistance   -clear liquid diet as tolerated  -monitor Gi Fxn   -Dispo Planning     Marian Peña   #[id number] 11-01-17 @ 08:34 Assessment:    ·	82y Male who presents with Small bowel obstruction s/p ex-lap resection of transverse & prox L colon w/ end colostomy     Plan:  -DVT PPX- Lovenox  -Cont pain control   -OOB as tolerated with assistance, encourage ambulation   -LRD, ensure   -monitor Gi Fxn   - F/u pathology       Darvin Garza D.M.D Surgery Blue Team # [id number] Assessment:    ·	82y Male who presents with Small bowel obstruction s/p ex-lap resection of transverse & prox L colon w/ end colostomy     Plan:  -D/c ilan today   -DVT PPX- Lovenox  -Cont pain control   -OOB as tolerated with assistance, encourage ambulation   -LRD, ensure   -monitor Gi Fxn   - F/u pathology       Darvin Garza D.M.D Surgery Blue Team # [id number] Procedure To Be Performed At Next Visit: Biopsy by shave method Detail Level: Detailed Instructions (Optional): Patient defers removal until rash on face is controlled

## 2019-07-02 NOTE — PATIENT PROFILE ADULT. - FUNCTIONAL SCREEN CURRENT LEVEL: AMBULATION, MLM
Thank you for choosing the [facility], Amanda Alcala MD, and our team as your Neuro Surgery Specialists.  We actively use feedback to constantly improve and deliver the best care possible. To provide the best experience we are collecting feedback from you on how we performed.  You may receive a survey in the mail to evaluate how we did. Please take a moment and share your thoughts.      If for any reason you feel that we did not meet your expectations or you want to share a positive experience, please give us a call. Your feedback helps us know how we are doing and what we can be doing better.    If your provider has ordered imaging for you to complete please call our pre-service department at [phone] to schedule.    Office hours: 8:00 am to 4:30 pm, Monday - Friday  Phone: [phone]     (3) assistive equipment and person

## 2021-02-02 NOTE — PROGRESS NOTE ADULT - NEUROLOGICAL
Dear Dr. Blankenship:    I had the pleasure of seeing Evin Sterling in follow-up today at the [facility].     History of Present Illness:   Evin Sterling is a 68 year old man with a T4aN0 SCC of the larynx. The patient has about a 4 year history of progressive hoarseness. He was potentially seen by an ENT locally in [facility] and diagnosed with reflux. He had insurance issues and was not able to afford the medication. He had progressive voice changes. He saw Dr Blankenship on 7/28/2020 for consultation at which time he had biphasic stridor and on scope exam was noted to have a large exophytic mass of the glottis without mobility of either cord and a glottic opening of about 5 mm.     He was taken to the OR on 8/3/2020 for an awake trach with DL. Biopsies were consistent with SCC. He had a PET scan which showed the tumor in the larynx with thyroid cartilage erosion but no obvious mariel disease. The patient demonstrated poor swallow function with aspiration on swallow study. Given these findings he was recommended surgery followed by adjuvant radiation.    He was taken to the OR on 8/25/2020 for a DL, total laryngectomy, left hemithyroidectomy, bilateral neck dissections, cricopharyngeal myotomy, dental extractions.. He had PEG tube placement performed by thoracic surgery at the time of surgery. His postoperative course was uncomplicated. His final pathology demonstrated a 2.5 cm invasive moderately differentiated SCC, 2.5 cm involving the right and left cords, invading the thyroid cartilage and focally extending to the soft tissues, benign thyroid, angiolymphatic invasion present, no PNI, negative margins, 0/86 lymph nodes.  His case was reviewed at tumor conference with recommendation for postoperative radiation. The patient decided against radiation. He was started on an oral diet in October 2020.  He had a negative posttreatment PET scan in November 2020.      Interval history:  He comes in  today for follow-up. He was last seen in clinic in November 2020.  At that time his PET was negative.  He had his TSH checked at that time which was slightly elevated, with a normal T4.  He had his PEG removed by thoracic surgery in December 2020. He says that he is doing overall well. He is not using his electrolarynx, says that he struggles without having teeth, drools when he talks. He says that he is leaving independently and has no one that he really talks to regularly. He says his swallowing ok, does use water to get things down. He denies any nasal regurgitation. He says his weight is going up, up about 5 lbs. He says that he is breathing without issues, takes careful care of his stoma. He says his alisa tube moves around quite a bit. He has no bleeding. He has no pain or masses. He feels like he is getting stronger. He feels his energy is ok. His daughter was diagnosed with ovarian cancer, getting chemotherapy at the [facility].    He is accompanied by his daughter who helps provide history.        MEDICATIONS:     Current Outpatient Medications   Medication Sig Dispense Refill     metoprolol tartrate (LOPRESSOR) 25 MG tablet 0.5 tablets (12.5 mg) by Per G Tube route 2 times daily 30 tablet 0     multivitamins w/minerals (CERTAVITE) liquid 15 mLs by Per Feeding Tube route daily 236 mL 1     ondansetron (ZOFRAN) 4 MG tablet Take 4 mg by mouth every 8 hours as needed for nausea       order for DME Equipment being ordered: Tracheostomy supplies    0.9% sodium chloride 1000 mL bottles  Box split 4x4 gauze sponges  Trach kits with brushes  Velcro trach ties  3 cc 0.9% sodium chloride lavages for trach suctioning  Large gloves  Cool mist humidity via trach dome  6-0 Shiley disposable inner cannulas    Diagnosis: laryngeal cancer 3 Month 1     order for DME Equipment being ordered:   Portable suction machine   14 French suction catheters  Yankeur suction tips    Diagnosis: laryngeal cancer 3 Month 1     order for  DME Equipment being ordered: Nasogastric bolus tube feeding supplies  Formula: Isosource 1.5, 6 cans per day  Gravity feeding bags  60 mL syringes    Treatment Diagnosis: laryngeal cancer 3 Month 1     spironolactone (ALDACTONE) 25 MG tablet Take 12.5 mg by mouth daily       acetaminophen (TYLENOL) 32 mg/mL liquid 20.3 mLs (650 mg) by Per Feeding Tube route every 6 hours (Patient not taking: Reported on 12/17/2020) 473 mL 1     acetaminophen (TYLENOL) 32 mg/mL liquid 20.3 mLs (650 mg) by Per Feeding Tube route every 4 hours as needed for pain (Patient not taking: Reported on 12/17/2020) 473 mL 1     calcitRIOL (ROCALTROL) 1 MCG/ML solution 0.25 mLs (0.25 mcg) by Per G Tube route 2 times daily for 6 days 3 mL 0     calcitRIOL (ROCALTROL) 1 MCG/ML solution 0.25 mLs (0.25 mcg) by Per G Tube route daily for 7 days 1.75 mL 0     carboxymethylcellulose PF (REFRESH PLUS) 0.5 % ophthalmic solution Place 1 drop into both eyes 4 times daily as needed for dry eyes (Patient not taking: Reported on 12/17/2020) 1 Bottle 3     doxazosin (CARDURA) 1 MG tablet 1 tablet (1 mg) by Per G Tube route daily for 14 days 14 tablet 0     glycopyrrolate (ROBINUL) 1 MG tablet 1 tablet (1 mg) by Per Feeding Tube route 2 times daily (Patient not taking: Reported on 12/17/2020) 30 tablet 1     lisinopril (ZESTRIL) 10 MG tablet 1 tablet (10 mg) by Per G Tube route daily for 14 days 14 tablet 0     mineral oil-hydrophilic petrolatum (AQUAPHOR) external ointment Apply topically every 8 hours (Patient not taking: Reported on 12/17/2020) 50 g 3     ondansetron (ZOFRAN) 4 MG tablet 1 tablet (4 mg) by Per G Tube route every 6 hours as needed for nausea (Patient not taking: Reported on 12/17/2020) 30 tablet 0     oxyCODONE (ROXICODONE) 5 MG/5ML solution 5-10 mLs (5-10 mg) by Per Feeding Tube route every 4 hours as needed for moderate to severe pain (Patient not taking: Reported on 12/17/2020) 420 mL 0     polyethylene glycol (MIRALAX) 17 g packet Take 17  g by mouth 2 times daily as needed for constipation (Patient not taking: Reported on 2020) 14 packet 0     senna-docusate (SENOKOT-S/PERICOLACE) 8.6-50 MG tablet 1 tablet by Per G Tube route 2 times daily (Patient not taking: Reported on 2020) 28 tablet 0     sennosides (SENOKOT) 8.8 MG/5ML syrup 5 mLs by Per Feeding Tube route nightly as needed for constipation (Patient not taking: Reported on 2020) 236 mL 0       ALLERGIES:  No Known Allergies    HABITS/SOCIAL HISTORY:   Previous alcoholic. Former smoker, quit 12 years ago, previously 1/2 ppd.  Some chewing tobacco use, quit about 30 years ago.    Lives with his son-in-law's father.    Previously worked in construction.    Social History     Socioeconomic History     Marital status:      Spouse name: Not on file     Number of children: Not on file     Years of education: Not on file     Highest education level: Not on file   Occupational History     Not on file   Social Needs     Financial resource strain: Not on file     Food insecurity     Worry: Not on file     Inability: Not on file     Transportation needs     Medical: Not on file     Non-medical: Not on file   Tobacco Use     Smoking status: Former Smoker     Packs/day: 1.00     Years: 20.00     Pack years: 20.00     Quit date:      Years since quittin.1     Smokeless tobacco: Former User   Substance and Sexual Activity     Alcohol use: Not Currently     Drug use: Not Currently     Sexual activity: Not on file   Lifestyle     Physical activity     Days per week: Not on file     Minutes per session: Not on file     Stress: Not on file   Relationships     Social connections     Talks on phone: Not on file     Gets together: Not on file     Attends Oriental orthodox service: Not on file     Active member of club or organization: Not on file     Attends meetings of clubs or organizations: Not on file     Relationship status: Not on file     Intimate partner violence     Fear of current  or ex partner: Not on file     Emotionally abused: Not on file     Physically abused: Not on file     Forced sexual activity: Not on file   Other Topics Concern     Not on file   Social History Narrative     Not on file       PAST MEDICAL HISTORY:   Past Medical History:   Diagnosis Date     CHF (congestive heart failure) (H)      GERD (gastroesophageal reflux disease)      Hypertension         PAST SURGICAL HISTORY:   Past Surgical History:   Procedure Laterality Date     DISSECTION RADICAL NECK BILATERAL Bilateral 8/25/2020    Procedure: Bilateral neck dissection;  Surgeon: Caron Wang MD;  Location: [facility] OR     ENDOSCOPIC INSERTION TUBE GASTROSTOMY N/A 8/25/2020    Procedure: INSERTION, PEG TUBE;  Surgeon: Reuben Hastings MD;  Location: [facility] OR     HERNIA REPAIR, INGUINAL RT/LT       HERNIA REPAIR, UMBILICAL       LARYNGECTOMY N/A 8/25/2020    Procedure: TOTAL LARYNGECTOMY WITH LEFT HEMITHYROIDECTOMY;  Surgeon: Caron Wang MD;  Location: [facility] OR     LARYNGOSCOPY N/A 8/25/2020    Procedure: DIRECT LARYNGOSCOPY;  Surgeon: Caron Wang MD;  Location: [facility] OR     LARYNGOSCOPY WITH BIOPSY(IES) N/A 8/3/2020    Procedure: LARYNGOSCOPY WITH BIOPSY, TRACHEOSTOMY, NG TUBE PLACEMENT;  Surgeon: Caron Wang MD;  Location: [facility] OR     ODONTECTOMY N/A 8/25/2020    Procedure: DENTAL EXTRACTION OF TEETH x 13;  Surgeon: Caron Wang MD;  Location: [facility]     TRACHEOSTOMY N/A 8/3/2020    Procedure: TRACHEOSTOMY;  Surgeon: Caron Wang MD;  Location: [facility] OR       FAMILY HISTORY:    Family History   Problem Relation Age of Onset     Anesthesia Reaction No family hx of      Deep Vein Thrombosis (DVT) No family hx of        REVIEW OF SYSTEMS:  12 point ROS was negative other than the symptoms noted above in the HPI.  Patient Supplied Answers to Review of Systems  [facility] ROS 9/4/2020   Constitutional -   Neurology Dizzy spells   Ears, Nose, Throat Ringing/noise in ears   Cardiopulmonary -  "  Gastrointestinal/Genitourinary -         PHYSICAL EXAMINATION:   Pulse 73   Temp 96.8  F (36  C) (Temporal)   Ht 1.753 m (5' 9\")   Wt 84 kg (185 lb 3 oz)   SpO2 99%   BMI 27.35 kg/m     Appearance:   normal; NAD, age-appropriate appearance, well-developed, normal habitus   Communication:   aphonic, communicates with writing and mouthing words   Head/Face:   inspection -  Normal; no scars or visible lesions   Salivary glands -  Normal size, no tenderness, swelling, or palpable masses   Facial strength -  Normal and symmetric    Skin:  normal, no rash   Ears:  auricle (AD) -  normal  EAC (AD) -  normal  TM (AD) -  Normal, no effusion  auricle (AS) -  normal  EAC (AS) - cerumen  TM (AS) -  Unable to visualize  Normal clinical speech reception   Nose:  Ext. inspection -  Normal  Internal Inspection -  Normal mucosa, septum, and turbinates   Nasopharynx:  normal mucosa, no masses   Oral Cavity:  lips -  Normal mucosa, oral competence, and stoma size   Edentulous, healthy gingival mucosa   Hard palate, buccal, floor of mouth mucosa normal   Tongue - normal movement, no lesions   Oropharynx:  mucosa -  Normal, no lesions  soft palate -  Normal, no lesions, no asymmetry, normal elevation   Neoharynx:  Normal mucosa, no masses  neovallecula clear  Shelf at site of previous epiglottis   Neck: Well healed apron neck incision  No palpable masses  Stoma patent, no crusting, no bleeding  Normal range of motion   Lymphatic:  no abnormal nodes   Cardiovascular:  warm, pink, well-perfused extremities without swelling, tenderness, or edema   Respiratory:  Normal respiratory effort, no stridor   Neuro/Psych.:  mood/affect -  normal  mental status -  normal       PROCEDURES:   Flexible fiberoptic laryngoscopy: Scope exam was indicated due to laryngeal cancer. Verbal consent was obtained. The nasal cavity was prepped with an aerosolized solution of topical anesthetic and vasoconstrictive agent. The scope was passed through the " anterior nasal cavity and advanced. Inspection of the nasopharynx revealed no gross abnormality. The base of tongue and neovallecula are normal. There is a shelf at the site of the previous epiglottis. The neopharynx is clear with healthy appearing mucosa and no masses.  Procedure tolerated well with no immediate complications noted.    Flexible tracheoscopy: The flexible scope was passed through the stoma and down to the primary bronchi. There is no crusting, no dry mucosa, no bleeding. The airway was patent. Patient tolerated the procedure well with no immediate complications.       RESULTS REVIEWED:   I reviewed the note from Dr. Brown      IMPRESSION AND PLAN:   Evin Sterling is a 68-year-old man with a T4aN0 SCC of the larynx. He underwent a total laryngectomy with bilateral neck dissections on 8/25/2020. He was recommended for postoperative radiation but decided against treatment.    He is doing well. I discussed with him that he should try to continue practicing with his electrolarynx, and that the lack of teeth should not be a deterrent.     He had his 3 month post treatment PET scan in November 2020.  He will need repeat CT neck and chest in May 2021.    He did have a hemithyroidectomy performed with his laryngectomy.  He had his TSH checked in November 2020 which was mildly elevated with a normal T4.  We will plan on repeating this when he gets his next set of imaging in May 2021.    I will see him back in 2 months.      Thank you very much for the opportunity to participate in the care of your patient.      Caron Wang MD, M.D.  Otolaryngology- Head & Neck Surgery      This note was dictated with voice recognition software and then edited. Please excuse any unintentional errors.         CC:  Kyaw Blankenship MD  [address] [address]      Vineet Gonzales MD  Department of Radiation Oncology  [facility]       Alert & oriented; no sensory, motor or coordination deficits, normal reflexes

## 2021-02-10 NOTE — H&P ADULT - NSHPREVIEWOFSYSTEMS_GEN_ALL_CORE
REVIEW OF SYSTEMS:  CONSTITUTIONAL: No fever, No chills, No sweats  EYES: No eye  discharge  ENMT:  No sinus or throat pain  RESPIRATORY: No cough,  No shortness of breath  CARDIOVASCULAR: No chest pain, palpitations, dizziness, or leg swelling  GASTROINTESTINAL: No abdominal pain. No nausea or vomiting; No change of ostomy output: No diarrhea or constipation. No melena or hematochezia.  GENITOURINARY: No dysuria, frequency, hematuria  NEUROLOGICAL: No headaches, memory loss, loss of strength, numbness, or tremors  SKIN: No rashes or lesions   MUSCULOSKELETAL: No joint pain or swelling  PSYCHIATRIC: No depression, anxiety  HEME/LYMPH: No easy bruising, or bleeding gums  ALLERGY AND IMMUNOLOGIC: No reactions to medications Pt presented with RUQ pain, leukocytosis, subjective fevers/chills, elevated T.bili 4.7, direct 2.3 and indirect 2.4, Alk phos 516, , ALT 51. Pt has hx of porcelain gallbladder (but was not a surgical candidate in the past).  - CT A/P w/ IV con (2/6/21): Mild intrahepatic biliary ductal dilation and irregular gallbladder wall, new since 4/1/2020 with interval removal of pancreatic duct stent.  - Concern for possible cholangitis  - f/u BCx  - c/w Zosyn (2/6/21- )  - MRCP ordered  - GI possibly plan for ERCP

## 2023-08-23 NOTE — CONSULT NOTE ADULT - ASSESSMENT
82M PHx HLD & hypothyroidism w/ abdominal pain x1 day, no BM x1week, found to have LBO at splenic flexure w/ evidence of pancreatic mass, peritoneal & lung nodules,. Plan for endoscopy from below by GI to attempt decompression & further evaluate possible mass at splenic flexure.     -Admit to Surgical Oncology, Attending Dr. Lamont Elizondo   -GI consult pending for likely flexible sigmoidoscopy    -NPO  -IVF  -DVTppx  -con't home statin  -obtain recent colonoscopy report  -verify home Rx dosing    Discussed w/ Attending    Questions/concerns: contact oncgaro Ruiz team PA/resident [id number] presenting with WEN, fatigue, palpitations, and dizziness x 6  months  H/H on admission 4.2/18.7-->7.3/26.1  No active signs of bleeding   Ct a/p as above  s/p 3U PRBCs since admission  maintain active type and screen  pending CTAP with contrast after transfusions  monitor CBC daily;  goal hgb >7  Plan for Colonoscopy 8/24 w/ Dr. Margie BEAR and GI following presenting with WEN, fatigue, palpitations, and dizziness x 6  months  H/H on admission 4.2/18.7-->7.3/26.1  No active signs of bleeding   Ct a/p as above  s/p 3U PRBCs since admission  maintain active type and screen  monitor CBC daily;  goal hgb >7  Plan for Colonoscopy 8/24 w/ Dr. Margie BEAR and GI following

## 2023-11-10 NOTE — ED ADULT TRIAGE NOTE - ESI TRIAGE ACUITY LEVEL, MLM
2 Patient is a 48M, domiciled with wife, unemployed (previously a teacher), PMHx of IBS, PPHX of Bipolar I with psychotic features, hx of SAs in setting of psychosis, hx of inpatient admissions, last at  July,2022, remote hx of substance use, no known legal issues, brought in by wife for insomnia r/t recent med changes, was previously on Ativan and Clonazepam but taken of Clonazepam and has been anxious with poor sleep for the past 2 days. Psychiatry consulted for evaluation.    Upon evaluation patient appears restless and anxious, reports he had recent med changes and has not been able to sleep. Reports he was on Ativan and Klonopin but psychiatrist did not want him on two benzo's, so kept Ativan PRN and tried Belsomra, which worked for a week only, then tried gabapentin but patient had s/e of memory loss and cognitive impairment so this medication was discontinued. He denies SI/HI. No AVH. No paranoia. No rony. Primary concern is finding the correct medication regimen, patients psychiatrist is on vacation and covering MD will not be able to speak with him until Monday. Offered patient voluntary admission which he ultimately declined. Wife and patient discussed going back to previous provider whom they paid out of pocket to but found the regimen he was on to be the most effective.    Collateral - Niecy Chavez, patient's wife: Confirms above information, states that patient was well on two benzo's in addition to Lamictal and Seroquel, reports current provider is not willing to have patient on both Klonopin and Ativan so has been trying different medications to bridge and taper off benzo's. Reports they have tried every medication regimen under the sun and are willing to go back to previous psychiatrist even thought they will have to pay out of pocket, which is the primary reason they sought out another provider who accepted insurance. She reports that patient has Ativan at home but hasn't been taking it at night despite Rx being written PRN for anxiety/insomnia.

## 2024-03-08 NOTE — PROGRESS NOTE ADULT - SUBJECTIVE AND OBJECTIVE BOX
[facility] Platelet-Rich Plasma Application    NAME:  Robert Coffey  YOB: 1931  MEDICAL RECORD NUMBER:  [id number]  DATE:  3/8/2024    Vacuum charged 2 S-Monovette tubes by pulling the monovette plunger to a full extension until the plunger is locked. The plunger shaft has been broken off. Ensured not to remove screw cap seal.  Tourniquet wrapped proximal to venipuncture site on Left Upper Extremity.  The venipuncture site is cleansed with an alcohol pad.  Vein palpated and inserted 21 Gauge Safety-Multi-Fly needle through the skin and into a vein.  White connector of Safety-Multi-Fly pushed onto S-Monovette and turned clockwise to secure. Allowed monovette to fill completely. Removed S-Monovette from Safety-Multi-Fly.  Gently invert the S-Monovette tube to mix blood with anticoagulant and place it in the provided tube coppola.  Repeated steps 5 & 6 until 2 S-Monovette tubes were full.  Tourniquet and Safety-Multi-Fly needle removed.  Pressure applied to site using 2x2 gauze and band-aid applied over site.   Safety-Multi-Fly safety device activated and utilized.   Any blood residue on S-Monovette screw caps was wiped off with an alcohol pad.  S-Monovette tubes were placed into the [facility]ix Centrifuge (on opposite sides if only 2 to balance, and if an odd number, an extra Monovette was filled with the same amount of liquid to provide a counterbalance.)  The lid was closed and secured, and the machine was stable.  Green Start button activated.  Once the Centrifuge has stopped, the lid can be opened.  S-Monovette Tubes removed from centrifuge and placed in the tube coppola taking care to ensure that red cells (bottom of tube) and PRP fractions (top of tube) were not re-mixed.   Reagents from the [facility] System Reagent Kit were prepared using manufacturers' specified guidelines.   Caps removed from S-Monovette Tubes.  PRP (clear yellow layer) drawn from each S-Monovette into the mixing chamber syringe, not drawing  Surgery Progress Note    S: Patient seen and examined. No acute events overnight. Pain well controlled with current regimen. Denies nausea/vomiting. Patient was extubated yesterday, doing well. Now off levo. Making good urine.    O:  Vital Signs Last 24 Hrs  T(C): 37.3 (30 Oct 2017 03:00), Max: 37.3 (30 Oct 2017 03:00)  T(F): 99.1 (30 Oct 2017 03:00), Max: 99.1 (30 Oct 2017 03:00)  HR: 109 (30 Oct 2017 05:00) (57 - 114)  BP: 143/76 (30 Oct 2017 05:00) (116/64 - 143/76)  BP(mean): 102 (30 Oct 2017 05:00) (84 - 102)  RR: 17 (30 Oct 2017 05:00) (9 - 29)  SpO2: 98% (30 Oct 2017 05:00) (92% - 100%)    I&O's Detail    28 Oct 2017 07:01  -  29 Oct 2017 07:00  --------------------------------------------------------  IN:    dexmedetomidine Infusion: 74.4 mL    norepinephrine Infusion: 260.1 mL    phenylephrine   Infusion: 176.8 mL    sodium chloride 0.9%.: 3000 mL    Solution: 375 mL    Solution: 400 mL    Solution: 2100 mL    Solution: 500 mL  Total IN: 6886.3 mL    OUT:    Colostomy: 30 mL    Indwelling Catheter - Urethral: 1285 mL    Nasoenteral Tube: 150 mL  Total OUT: 1465 mL    Total NET: 5421.3 mL      29 Oct 2017 07:01  -  30 Oct 2017 05:26  --------------------------------------------------------  IN:    dexmedetomidine Infusion: 14.2 mL    norepinephrine Infusion: 94.7 mL    sodium chloride 0.9%.: 1875 mL    Solution: 200 mL    Solution: 300 mL  Total IN: 2483.9 mL    OUT:    Colostomy: 5 mL    Indwelling Catheter - Urethral: 2410 mL    Nasoenteral Tube: 15 mL  Total OUT: 2430 mL    Total NET: 53.9 mL          MEDICATIONS  (STANDING):  enoxaparin Injectable 40 milliGRAM(s) SubCutaneous daily  insulin lispro (HumaLOG) corrective regimen sliding scale   SubCutaneous every 6 hours  levothyroxine Injectable 37.5 MICROGram(s) IV Push daily  pantoprazole  Injectable 40 milliGRAM(s) IV Push daily  piperacillin/tazobactam IVPB. 3.375 Gram(s) IV Intermittent every 8 hours  potassium chloride  20 mEq/100 mL IVPB 20 milliEquivalent(s) IV Intermittent every 2 hours  simvastatin 40 milliGRAM(s) Oral at bedtime  sodium chloride 0.9%. 1000 milliLiter(s) (75 mL/Hr) IV Continuous <Continuous>  sodium phosphate IVPB 15 milliMole(s) IV Intermittent once  sodium phosphate IVPB 15 milliMole(s) IV Intermittent once    MEDICATIONS  (PRN):  HYDROmorphone  Injectable 0.5 milliGRAM(s) IV Push every 4 hours PRN Moderate Pain (4 - 6)                            10.4   9.6   )-----------( 117      ( 30 Oct 2017 02:41 )             29.4       10-30    139  |  107  |  11  ----------------------------<  100<H>  3.1<L>   |  20<L>  |  0.62    Ca    7.7<L>      30 Oct 2017 02:41  Phos  1.8     10-30  Mg     2.2     10-30        Physical Exam:  Gen: Laying in bed, NAD, alert and oriented.   Resp: Unlabored breathing  Abd: soft, appropriately tender, non-distended. Ostomy pink/viable with some gas in bag. Surgery Progress Note    S: Patient seen and examined. No acute events overnight. Pain well controlled with current regimen. Denies nausea/vomiting. Patient was extubated yesterday, doing well. Now off levo. Making good urine.    O:  Vital Signs Last 24 Hrs  T(C): 37.3 (30 Oct 2017 03:00), Max: 37.3 (30 Oct 2017 03:00)  T(F): 99.1 (30 Oct 2017 03:00), Max: 99.1 (30 Oct 2017 03:00)  HR: 109 (30 Oct 2017 05:00) (57 - 114)  BP: 143/76 (30 Oct 2017 05:00) (116/64 - 143/76)  BP(mean): 102 (30 Oct 2017 05:00) (84 - 102)  RR: 17 (30 Oct 2017 05:00) (9 - 29)  SpO2: 98% (30 Oct 2017 05:00) (92% - 100%)    I&O's Detail    28 Oct 2017 07:01  -  29 Oct 2017 07:00  --------------------------------------------------------  IN:    dexmedetomidine Infusion: 74.4 mL    norepinephrine Infusion: 260.1 mL    phenylephrine   Infusion: 176.8 mL    sodium chloride 0.9%.: 3000 mL    Solution: 375 mL    Solution: 400 mL    Solution: 2100 mL    Solution: 500 mL  Total IN: 6886.3 mL    OUT:    Colostomy: 30 mL    Indwelling Catheter - Urethral: 1285 mL    Nasoenteral Tube: 150 mL  Total OUT: 1465 mL    Total NET: 5421.3 mL      29 Oct 2017 07:01  -  30 Oct 2017 05:26  --------------------------------------------------------  IN:    dexmedetomidine Infusion: 14.2 mL    norepinephrine Infusion: 94.7 mL    sodium chloride 0.9%.: 1875 mL    Solution: 200 mL    Solution: 300 mL  Total IN: 2483.9 mL    OUT:    Colostomy: 5 mL    Indwelling Catheter - Urethral: 2410 mL    Nasoenteral Tube: 15 mL  Total OUT: 2430 mL    Total NET: 53.9 mL          MEDICATIONS  (STANDING):  enoxaparin Injectable 40 milliGRAM(s) SubCutaneous daily  insulin lispro (HumaLOG) corrective regimen sliding scale   SubCutaneous every 6 hours  levothyroxine Injectable 37.5 MICROGram(s) IV Push daily  pantoprazole  Injectable 40 milliGRAM(s) IV Push daily  piperacillin/tazobactam IVPB. 3.375 Gram(s) IV Intermittent every 8 hours  potassium chloride  20 mEq/100 mL IVPB 20 milliEquivalent(s) IV Intermittent every 2 hours  simvastatin 40 milliGRAM(s) Oral at bedtime  sodium chloride 0.9%. 1000 milliLiter(s) (75 mL/Hr) IV Continuous <Continuous>  sodium phosphate IVPB 15 milliMole(s) IV Intermittent once  sodium phosphate IVPB 15 milliMole(s) IV Intermittent once    MEDICATIONS  (PRN):  HYDROmorphone  Injectable 0.5 milliGRAM(s) IV Push every 4 hours PRN Moderate Pain (4 - 6)                            10.4   9.6   )-----------( 117      ( 30 Oct 2017 02:41 )             29.4       10-30    139  |  107  |  11  ----------------------------<  100<H>  3.1<L>   |  20<L>  |  0.62    Ca    7.7<L>      30 Oct 2017 02:41  Phos  1.8     10-30  Mg     2.2     10-30        Physical Exam:  Gen: Laying in bed, NAD, alert and oriented.   Resp: Unlabored breathing  Abd: soft, appropriately tender, non-distended. Vertical midline incision c/d/i. Ostomy pink/viable, bag empty. 21-Oct-2018 15:59

## 2024-07-26 NOTE — ED PROVIDER NOTE - CONSTITUTIONAL, MLM
RN sent patient a message on [employer].   normal... Well appearing, well nourished, awake, alert, oriented to person, place, time/situation and in no apparent distress.

## 2024-07-30 NOTE — PATIENT PROFILE ADULT. - SOCIAL CONCERNS
Patient is scheduled for octreotide injection 8/7 at [facility] and was hospitalized 7/24-/7/30. An okay to treat was received from Nessa Maurer NP on 7/30/24.     None